# Patient Record
Sex: MALE | Race: WHITE | NOT HISPANIC OR LATINO | Employment: UNEMPLOYED | URBAN - METROPOLITAN AREA
[De-identification: names, ages, dates, MRNs, and addresses within clinical notes are randomized per-mention and may not be internally consistent; named-entity substitution may affect disease eponyms.]

---

## 2017-02-18 DIAGNOSIS — J31.0 CHRONIC RHINITIS: ICD-10-CM

## 2017-02-21 RX ORDER — FLUTICASONE PROPIONATE 50 MCG
SPRAY, SUSPENSION (ML) NASAL
Qty: 16 ML | Refills: 0 | Status: SHIPPED | OUTPATIENT
Start: 2017-02-21 | End: 2017-03-21

## 2017-02-21 NOTE — TELEPHONE ENCOUNTER
Prescription approved per [facility] Refill Protocol.  Tala Salcido RN

## 2017-03-21 DIAGNOSIS — J31.0 CHRONIC RHINITIS: ICD-10-CM

## 2017-03-21 NOTE — TELEPHONE ENCOUNTER
fluticasone (FLONASE) 50 MCG/ACT spray       Last Written Prescription Date: 2/21/17  Last Fill Quantity: 16 ml, # refills: 0    Last Office Visit with G, P or [facility] prescribing provider:  8/24/16   Future Office Visit:       Date of Last Asthma Action Plan Letter:   There are no preventive care reminders to display for this patient.   Asthma Control Test: No flowsheet data found.    Date of Last Spirometry Test:   No results found for this or any previous visit.            Neptali Faarax  [facility]

## 2017-03-23 RX ORDER — FLUTICASONE PROPIONATE 50 MCG
SPRAY, SUSPENSION (ML) NASAL
Qty: 16 ML | Refills: 4 | Status: SHIPPED | OUTPATIENT
Start: 2017-03-23 | End: 2019-04-05

## 2017-03-23 NOTE — TELEPHONE ENCOUNTER
Prescription approved per [facility] [place].  Beth Yepez RN

## 2017-06-02 ENCOUNTER — HOSPITAL ENCOUNTER (OUTPATIENT)
Facility: CLINIC | Age: 57
Setting detail: SPECIMEN
Discharge: HOME | End: 2017-06-02
Attending: INTERNAL MEDICINE | Admitting: INTERNAL MEDICINE
Payer: COMMERCIAL

## 2017-06-02 ENCOUNTER — RADIANT APPOINTMENT (OUTPATIENT)
Dept: GENERAL RADIOLOGY | Facility: CLINIC | Age: 57
End: 2017-06-02
Attending: INTERNAL MEDICINE
Payer: COMMERCIAL

## 2017-06-02 ENCOUNTER — OFFICE VISIT (OUTPATIENT)
Dept: FAMILY MEDICINE | Facility: CLINIC | Age: 57
End: 2017-06-02
Payer: COMMERCIAL

## 2017-06-02 VITALS
BODY MASS INDEX: 31.54 KG/M2 | DIASTOLIC BLOOD PRESSURE: 84 MMHG | OXYGEN SATURATION: 95 % | SYSTOLIC BLOOD PRESSURE: 131 MMHG | HEART RATE: 86 BPM | TEMPERATURE: 98.9 F | HEIGHT: 63 IN | WEIGHT: 178 LBS

## 2017-06-02 DIAGNOSIS — J30.89 SEASONAL ALLERGIC RHINITIS DUE TO OTHER ALLERGIC TRIGGER: Primary | ICD-10-CM

## 2017-06-02 DIAGNOSIS — R05.8 UPPER AIRWAY COUGH SYNDROME: ICD-10-CM

## 2017-06-02 LAB
BASOPHILS # BLD AUTO: 0 10E9/L (ref 0–0.2)
BASOPHILS NFR BLD AUTO: 0.3 %
DIFFERENTIAL METHOD BLD: NORMAL
EOSINOPHIL # BLD AUTO: 0.1 10E9/L (ref 0–0.7)
EOSINOPHIL NFR BLD AUTO: 2.4 %
ERYTHROCYTE [DISTWIDTH] IN BLOOD BY AUTOMATED COUNT: 12.6 % (ref 10–15)
HCT VFR BLD AUTO: 42.3 % (ref 40–53)
HGB BLD-MCNC: 14.3 G/DL (ref 13.3–17.7)
LYMPHOCYTES # BLD AUTO: 1 10E9/L (ref 0.8–5.3)
LYMPHOCYTES NFR BLD AUTO: 17.1 %
MCH RBC QN AUTO: 31.6 PG (ref 26.5–33)
MCHC RBC AUTO-ENTMCNC: 33.8 G/DL (ref 31.5–36.5)
MCV RBC AUTO: 93 FL (ref 78–100)
MONOCYTES # BLD AUTO: 0.4 10E9/L (ref 0–1.3)
MONOCYTES NFR BLD AUTO: 7 %
NEUTROPHILS # BLD AUTO: 4.3 10E9/L (ref 1.6–8.3)
NEUTROPHILS NFR BLD AUTO: 73.2 %
PLATELET # BLD AUTO: 176 10E9/L (ref 150–450)
RBC # BLD AUTO: 4.53 10E12/L (ref 4.4–5.9)
WBC # BLD AUTO: 5.9 10E9/L (ref 4–11)

## 2017-06-02 PROCEDURE — 85025 COMPLETE CBC W/AUTO DIFF WBC: CPT | Performed by: INTERNAL MEDICINE

## 2017-06-02 PROCEDURE — 70220 X-RAY EXAM OF SINUSES: CPT

## 2017-06-02 PROCEDURE — 99214 OFFICE O/P EST MOD 30 MIN: CPT | Performed by: INTERNAL MEDICINE

## 2017-06-02 PROCEDURE — 36415 COLL VENOUS BLD VENIPUNCTURE: CPT | Performed by: INTERNAL MEDICINE

## 2017-06-02 PROCEDURE — 82785 ASSAY OF IGE: CPT | Performed by: INTERNAL MEDICINE

## 2017-06-02 RX ORDER — FEXOFENADINE HCL 180 MG/1
180 TABLET ORAL EVERY MORNING
Qty: 30 TABLET | Refills: 11 | Status: SHIPPED | OUTPATIENT
Start: 2017-06-02 | End: 2017-07-06

## 2017-06-02 RX ORDER — METHYLPREDNISOLONE 4 MG
TABLET, DOSE PACK ORAL
Qty: 21 TABLET | Refills: 1 | Status: SHIPPED | OUTPATIENT
Start: 2017-06-02 | End: 2017-07-06

## 2017-06-02 RX ORDER — MONTELUKAST SODIUM 10 MG/1
10 TABLET ORAL AT BEDTIME
Qty: 30 TABLET | Refills: 11 | Status: SHIPPED | OUTPATIENT
Start: 2017-06-02 | End: 2019-02-11

## 2017-06-02 RX ORDER — LORATADINE 10 MG/1
10 TABLET ORAL EVERY MORNING
Qty: 30 TABLET | Refills: 11 | Status: SHIPPED | OUTPATIENT
Start: 2017-06-02 | End: 2018-02-01

## 2017-06-02 RX ORDER — CETIRIZINE HYDROCHLORIDE 10 MG/1
10 TABLET ORAL EVERY EVENING
Qty: 30 TABLET | Refills: 11 | Status: SHIPPED | OUTPATIENT
Start: 2017-06-02 | End: 2018-02-01

## 2017-06-02 NOTE — MR AVS SNAPSHOT
After Visit Summary   6/2/2017    Kody Benton    MRN: [id number]           Patient Information     Date Of Birth          1960        Visit Information        Provider Department      6/2/2017 1:40 PM Huang Keane MD [facility]        Today's Diagnoses     Seasonal allergic rhinitis due to other allergic trigger    -  1    Upper airway cough syndrome          Care Instructions    This summary includes the important diagnoses, test, medications and other important parts of your medical history.  Below are a few good we sites you can use to learn more about these.     Www.Pro[employer] Tools.org : Up to date and easily searchable information on multiple topics.  Www.[facility] Hospital[facility].Vivakor/Pharmacy/c_539084.asp : [facility] $4.99 medications  Www.Planet8.gov : medication info, interactive tutorials, watch real surgeries online  Www.familydoctor.org : good info from the Academy of Family Physicians  Www.Phobious.[employer] : good info from the [facility]  Www.cdc.gov : public health info, travel advisories, epidemics (H1N1)  Www.aap.org : children's health info, normal development, vaccinations  Www.health.[facility].[place].us : [place] dept of heatlh, public health issues in [place], N1N1    Based on your medical history and these are the current health maintenance or preventive care services that you are due for (some may have been done at this visit:)  Health Maintenance Due   Topic Date Due     TETANUS IMMUNIZATION (SYSTEM ASSIGNED)  07/14/1977     HEPATITIS C SCREENING  07/14/1977     COLON CANCER SCREEN (SYSTEM ASSIGNED)  07/14/2009     =================================================================================  Normal Values   Blood pressure  <140/90 for most adults    <130/80 for some chronic diseases (ask your care team about yours)    BMI (body mass index)  18.5-25 kg/m2 (based on height and weight)     Thank you for visiting [facility]    Normal or non-critical  lab and imaging results will be communicated to you by MyChart, letter or phone within 7 days.  If you do not hear from us within 10 days, please call the clinic. If you have a critical or abnormal lab result, we will notify you by phone as soon as possible.     If you have any questions regarding your visit please contact:     Team Comfort:   Clinic Hours Telephone Number   Dr. Marlon Castañeda   7am-5pm  Monday - Friday [phone]  Gregor RN   Pharmacy 8am-8pm Monday-Thursday      8am-6pm Friday  9am-5pm Saturday-Sunday [phone]   Urgent Care 11am-8pm Monday-Friday        9am-5pm Saturday-Sunday [phone]     After hours, weekend or if you need to make an appointment with your primary provider please call [phone].   After Hours nurse advise: call [facility]: [phone]    Medication Refills:  Call your pharmacy and they will forward the refill to us. Please allow 3 business days for your refills to be completed.    Use CrossCore (secure email communication and access to your chart) to send your primary care provider a message or make an appointment. Ask someone on your Team how to sign up for CrossCore. To log on to SIPX or for more information in [employer] please visit the website at [email or web address].  As of October 8, 2013, all password changes, disabled accounts, or ID changes in CrossCore/MyHealth will be done by our Access Services Department.   If you need help with your account or password, call: [phone]. Clinic staff no longer has the ability to change passwords.             Follow-ups after your visit        Who to contact     If you have questions or need follow up information about today's clinic visit or your schedule please contact [facility] directly at [phone].  Normal or non-critical lab and imaging results will be communicated to you by MyChart, letter or  "phone within 4 business days after the clinic has received the results. If you do not hear from us within 7 days, please contact the clinic through [employer] or phone. If you have a critical or abnormal lab result, we will notify you by phone as soon as possible.  Submit refill requests through [employer] or call your pharmacy and they will forward the refill request to us. Please allow 3 business days for your refill to be completed.          Additional Information About Your Visit        [employer] Information     [employer] gives you secure access to your electronic health record. If you see a primary care provider, you can also send messages to your care team and make appointments. If you have questions, please call your primary care clinic.  If you do not have a primary care provider, please call [phone] and they will assist you.        Care EveryWhere ID     This is your Care EveryWhere ID. This could be used by other organizations to access your [facility] medical records  [id number]        Your Vitals Were     Pulse Temperature Height Pulse Oximetry BMI (Body Mass Index)       86 98.9  F (37.2  C) (Oral) 5' 3\" (1.6 m) 95% 31.53 kg/m2        Blood Pressure from Last 3 Encounters:   06/02/17 131/84   08/24/16 108/65   01/07/16 101/63    Weight from Last 3 Encounters:   06/02/17 178 lb (80.7 kg)   08/24/16 163 lb (73.9 kg)   01/06/16 156 lb (70.8 kg)              We Performed the Following     CBC with platelets and differential     IgE     XR Sinus Complete G/E 3 Views          Today's Medication Changes          These changes are accurate as of: 6/2/17  2:34 PM.  If you have any questions, ask your nurse or doctor.               Start taking these medicines.        Dose/Directions    cetirizine 10 MG tablet   Commonly known as:  zyrTEC   Used for:  Seasonal allergic rhinitis due to other allergic trigger   Started by:  Huang Keane MD        Dose:  10 mg   Take 1 tablet (10 mg) by mouth every evening "   Quantity:  30 tablet   Refills:  11       methylPREDNISolone 4 MG tablet   Commonly known as:  MEDROL DOSEPAK   Used for:  Seasonal allergic rhinitis due to other allergic trigger   Started by:  Huang Keane MD        Follow package instructions   Quantity:  21 tablet   Refills:  1       montelukast 10 MG tablet   Commonly known as:  SINGULAIR   Used for:  Seasonal allergic rhinitis due to other allergic trigger   Started by:  Huang Keane MD        Dose:  10 mg   Take 1 tablet (10 mg) by mouth At Bedtime   Quantity:  30 tablet   Refills:  11         These medicines have changed or have updated prescriptions.        Dose/Directions    * loratadine 10 MG tablet   Commonly known as:  CLARITIN   This may have changed:  Another medication with the same name was added. Make sure you understand how and when to take each.   Used for:  Chronic rhinitis   Changed by:  Huang Keane MD        Dose:  10 mg   Take 1 tablet (10 mg) by mouth daily   Quantity:  90 tablet   Refills:  3       * loratadine 10 MG tablet   Commonly known as:  CLARITIN   This may have changed:  You were already taking a medication with the same name, and this prescription was added. Make sure you understand how and when to take each.   Used for:  Seasonal allergic rhinitis due to other allergic trigger   Changed by:  Huang Kenae MD        Dose:  10 mg   Take 1 tablet (10 mg) by mouth every morning   Quantity:  30 tablet   Refills:  11       * Notice:  This list has 2 medication(s) that are the same as other medications prescribed for you. Read the directions carefully, and ask your doctor or other care provider to review them with you.         Where to get your medicines      These medications were sent to [place] [address] [place] [facility] - [address]  [address] [place] [address]     Phone:  [phone]     cetirizine 10 MG tablet    loratadine 10 MG tablet    methylPREDNISolone 4 MG tablet     montelukast 10 MG tablet                Primary Care Provider Office Phone # Fax #    Huang Keane [phone]       [facility] [address]  [facility] [address]        Thank you!     Thank you for choosing [facility]  for your care. Our goal is always to provide you with excellent care. Hearing back from our patients is one way we can continue to improve our services. Please take a few minutes to complete the written survey that you may receive in the mail after your visit with us. Thank you!             Your Updated Medication List - Protect others around you: Learn how to safely use, store and throw away your medicines at www.disposemymeds.org.          This list is accurate as of: 6/2/17  2:34 PM.  Always use your most recent med list.                   Brand Name Dispense Instructions for use    cetirizine 10 MG tablet    zyrTEC    30 tablet    Take 1 tablet (10 mg) by mouth every evening       fluticasone 50 MCG/ACT spray    FLONASE    16 mL    USE TWO SPRAYS IN EACH NOSTRIL DAILY       * loratadine 10 MG tablet    CLARITIN    90 tablet    Take 1 tablet (10 mg) by mouth daily       * loratadine 10 MG tablet    CLARITIN    30 tablet    Take 1 tablet (10 mg) by mouth every morning       methylPREDNISolone 4 MG tablet    MEDROL DOSEPAK    21 tablet    Follow package instructions       montelukast 10 MG tablet    SINGULAIR    30 tablet    Take 1 tablet (10 mg) by mouth At Bedtime       * Notice:  This list has 2 medication(s) that are the same as other medications prescribed for you. Read the directions carefully, and ask your doctor or other care provider to review them with you.

## 2017-06-02 NOTE — PATIENT INSTRUCTIONS
This summary includes the important diagnoses, test, medications and other important parts of your medical history.  Below are a few good we sites you can use to learn more about these.     Www.Ribbit.org : Up to date and easily searchable information on multiple topics.  Www.Ribbit.org/Pharmacy/c_539084.asp : [facility] $4.99 medications  Www.medlineplus.gov : medication info, interactive tutorials, watch real surgeries online  Www.familydoctor.org : good info from the Academy of Family Physicians  Www.eedeninic.com : good info from the [facility]  Www.cdc.gov : public health info, travel advisories, epidemics (H1N1)  Www.aap.org : children's health info, normal development, vaccinations  Www.health.[facility].[place].us : [place] dept of heat, public health issues in [place], N1N1    Based on your medical history and these are the current health maintenance or preventive care services that you are due for (some may have been done at this visit:)  Health Maintenance Due   Topic Date Due     TETANUS IMMUNIZATION (SYSTEM ASSIGNED)  07/14/1977     HEPATITIS C SCREENING  07/14/1977     COLON CANCER SCREEN (SYSTEM ASSIGNED)  07/14/2009     =================================================================================  Normal Values   Blood pressure  <140/90 for most adults    <130/80 for some chronic diseases (ask your care team about yours)    BMI (body mass index)  18.5-25 kg/m2 (based on height and weight)     Thank you for visiting [facility]    Normal or non-critical lab and imaging results will be communicated to you by MyChart, letter or phone within 7 days.  If you do not hear from us within 10 days, please call the clinic. If you have a critical or abnormal lab result, we will notify you by phone as soon as possible.     If you have any questions regarding your visit please contact:     Team Comfort:   Clinic Hours Telephone Number   Dr. Marlon Douglass Dr.  Mehdi Robertshel Lior  Do Castañeda   7am-5pm  Monday - Friday [phone]  rGegor SCOTT   Pharmacy 8am-8pm Monday-Thursday      8am-6pm Friday  9am-5pm Saturday-Sunday [phone]   Urgent Care 11am-8pm Monday-Friday        9am-5pm Saturday-Sunday [phone]     After hours, weekend or if you need to make an appointment with your primary provider please call [phone].   After Hours nurse advise: call [facility]: [phone]    Medication Refills:  Call your pharmacy and they will forward the refill to us. Please allow 3 business days for your refills to be completed.    Use [employer] (secure email communication and access to your chart) to send your primary care provider a message or make an appointment. Ask someone on your Team how to sign up for [employer]. To log on to 4Blox or for more information in FilmDoo please visit the website at [email or web address].  As of October 8, 2013, all password changes, disabled accounts, or ID changes in [employer]/MyHealth will be done by our Access Services Department.   If you need help with your account or password, call: [phone]. Clinic staff no longer has the ability to change passwords.

## 2017-06-02 NOTE — NURSING NOTE
"Chief Complaint   Patient presents with     Allergies       Initial /84 (BP Location: Left arm, Patient Position: Chair, Cuff Size: Adult Regular)  Pulse 86  Temp 98.9  F (37.2  C) (Oral)  Ht 5' 3\" (1.6 m)  Wt 178 lb (80.7 kg)  SpO2 95%  BMI 31.53 kg/m2 Estimated body mass index is 31.53 kg/(m^2) as calculated from the following:    Height as of this encounter: 5' 3\" (1.6 m).    Weight as of this encounter: 178 lb (80.7 kg).  Medication Reconciliation: complete     Sai Claros MA      "

## 2017-06-02 NOTE — PROGRESS NOTES
SUBJECTIVE:                                                    Kody Benton is a 56 year old male who presents to clinic today for the following health issues:    ALLERGIES     Onset: ongoing issue    Description:   Nasal congestion: YES  Sneezing: YES  Red, itchy eyes: YES- sometime    Progression of Symptoms:  same    Accompanying Signs & Symptoms:  Cough: no  Wheezing: no  Rash: no  Sinus/facial pain: YES- sometime   History:   Is it seasonal: during any time of the year   History of Asthma: no  Has allergy testing been done: no    Precipitating factors:   None    Alleviating factors:  None       Therapies Tried and outcome: claritin and flonase, no relief          Problem list and histories reviewed & adjusted, as indicated.  Additional history: as documented    Patient Active Problem List   Diagnosis     Overweight (BMI 25.0-29.9)     Advanced directives, counseling/discussion     Down's syndrome     Unilateral inguinal hernia     Chronic rhinitis     Esophageal web determined by endoscopy     Gastritis, Helicobacter pylori     History reviewed. No pertinent surgical history.    Social History   Substance Use Topics     Smoking status: Never Smoker     Smokeless tobacco: Never Used     Alcohol use Yes      Comment: socially     History reviewed. No pertinent family history.      No Known Allergies  Recent Labs   Lab Test  10/08/15   1405   LDL  70   HDL  63   TRIG  102   ALT  16   CR  0.94   GFRESTIMATED  83   GFRESTBLACK  >90   GFR Calc     POTASSIUM  3.9      BP Readings from Last 3 Encounters:   06/02/17 131/84   08/24/16 108/65   01/07/16 101/63    Wt Readings from Last 3 Encounters:   06/02/17 178 lb (80.7 kg)   08/24/16 163 lb (73.9 kg)   01/06/16 156 lb (70.8 kg)                  ROS:  C: NEGATIVE for fever, chills, change in weight  I: NEGATIVE for worrisome rashes, moles or lesions  E: NEGATIVE for vision changes or irritation  ENT/MOUTH: As above.  RESP:NEGATIVE for hemoptysis,  "pleurisy and wheezing  CV: NEGATIVE for chest pain, palpitations or peripheral edema  GI: NEGATIVE for nausea, abdominal pain, heartburn, or change in bowel habits  : NEGATIVE for frequency, dysuria, or hematuria  M: NEGATIVE for significant arthralgias or myalgia  N: NEGATIVE for weakness, dizziness or paresthesias  E: NEGATIVE for temperature intolerance, skin/hair changes  H: NEGATIVE for bleeding problems  P: NEGATIVE for changes in mood or affect    OBJECTIVE:                                                    /84 (BP Location: Left arm, Patient Position: Chair, Cuff Size: Adult Regular)  Pulse 86  Temp 98.9  F (37.2  C) (Oral)  Ht 5' 3\" (1.6 m)  Wt 178 lb (80.7 kg)  SpO2 95%  BMI 31.53 kg/m2  Body mass index is 31.53 kg/(m^2).  GENERAL: healthy, alert and no distress  EYES: Eyes grossly normal to inspection  HENT: ear canals and TM's normal and nose and mouth without ulcers or lesions  NECK: no adenopathy  RESP: lungs clear to auscultation - no rales, no rhonchi, no wheezes  CV: regular rates and rhythm, normal S1 S2, no S3 or S4 and no murmur, no click or rub -  SKIN: no suspicious lesions, no rashes  NEURO: strength and tone- normal, sensory exam- grossly normal, mentation- intact, speech- normal, reflexes- symmetric  BACK: no CVA tenderness, no paralumbar tenderness  - male: testicles- normal, no atrophy, no masses;  no inguinal hernias  RECTAL- male: no masses, no hemorhoids, Prostate- symmetric, no  nodularity, no masses, no hypertrophy  PSYCH: Alert and oriented times 3; speech- coherent    Diagnostic test results:  Results for orders placed or performed in visit on 06/02/17   XR Sinus Complete G/E 3 Views    Narrative    XR SINUS COMPLETE G/E 3 VW 6/2/2017 2:44 PM    COMPARISON: None.    HISTORY: Cough      Impression    IMPRESSION: No air-fluid levels are seen in the sinuses. No fractures  identified.    PELON MORTENSEN   IgE   Result Value Ref Range     0 - 114 KIU/L   CBC with " platelets and differential   Result Value Ref Range    WBC 5.9 4.0 - 11.0 10e9/L    RBC Count 4.53 4.4 - 5.9 10e12/L    Hemoglobin 14.3 13.3 - 17.7 g/dL    Hematocrit 42.3 40.0 - 53.0 %    MCV 93 78 - 100 fl    MCH 31.6 26.5 - 33.0 pg    MCHC 33.8 31.5 - 36.5 g/dL    RDW 12.6 10.0 - 15.0 %    Platelet Count 176 150 - 450 10e9/L    Diff Method Automated Method     % Neutrophils 73.2 %    % Lymphocytes 17.1 %    % Monocytes 7.0 %    % Eosinophils 2.4 %    % Basophils 0.3 %    Absolute Neutrophil 4.3 1.6 - 8.3 10e9/L    Absolute Lymphocytes 1.0 0.8 - 5.3 10e9/L    Absolute Monocytes 0.4 0.0 - 1.3 10e9/L    Absolute Eosinophils 0.1 0.0 - 0.7 10e9/L    Absolute Basophils 0.0 0.0 - 0.2 10e9/L        ASSESSMENT/PLAN:                                                        ICD-10-CM    1. Seasonal allergic rhinitis due to other allergic trigger J30.89 loratadine (CLARITIN) 10 MG tablet     cetirizine (ZYRTEC) 10 MG tablet     montelukast (SINGULAIR) 10 MG tablet     methylPREDNISolone (MEDROL DOSEPAK) 4 MG tablet     IgE     CBC with platelets and differential     fexofenadine (ALLEGRA) 180 MG tablet   2. Upper airway cough syndrome R05 XR Sinus Complete G/E 3 Views       Follow-up visit if condition worsens.    Huang Keane MD  [facility]

## 2017-06-06 LAB — IGE SERPL-ACNC: 109 KIU/L (ref 0–114)

## 2017-06-12 ENCOUNTER — TELEPHONE (OUTPATIENT)
Dept: FAMILY MEDICINE | Facility: CLINIC | Age: 57
End: 2017-06-12

## 2017-06-12 NOTE — TELEPHONE ENCOUNTER
..Reason for Call:    Authorization form is missing information    Detailed comments: only one attempt is made to gather missing information, after that, a denial will be issued for lack of information    Phone Number Patient can be reached at: Other phone number:  [phone]   Best Time: anytime    Can we leave a detailed message on this number? Not Applicable    Call taken on 6/12/2017 at 10:16 AM by Valencia Prather

## 2017-06-13 NOTE — TELEPHONE ENCOUNTER
[facility]: DOMINIQUE called [phone]  She received a Service Authorization Form for a referral to an unsure location.   Sadie is out of network for [facility].  Dominique is going to refax the form. If the info is not completed and faxed back by tomorrow she will have to denied the visit due to lack of information  Lyndsey Jones

## 2017-06-15 NOTE — TELEPHONE ENCOUNTER
Patient visits was approved by [facility] for a 120 days. Approval number is [id number].  An approval letter will be faxed to the clinic and the patient.  Form was given to Chen Jones

## 2017-07-06 ENCOUNTER — OFFICE VISIT (OUTPATIENT)
Dept: FAMILY MEDICINE | Facility: CLINIC | Age: 57
End: 2017-07-06
Payer: COMMERCIAL

## 2017-07-06 VITALS
SYSTOLIC BLOOD PRESSURE: 112 MMHG | TEMPERATURE: 99.5 F | WEIGHT: 177 LBS | HEART RATE: 75 BPM | OXYGEN SATURATION: 97 % | BODY MASS INDEX: 31.36 KG/M2 | HEIGHT: 63 IN | DIASTOLIC BLOOD PRESSURE: 70 MMHG

## 2017-07-06 DIAGNOSIS — J01.01 ACUTE RECURRENT MAXILLARY SINUSITIS: ICD-10-CM

## 2017-07-06 DIAGNOSIS — R53.83 FATIGUE, UNSPECIFIED TYPE: ICD-10-CM

## 2017-07-06 DIAGNOSIS — J30.81 ALLERGIC RHINITIS DUE TO ANIMAL HAIR AND DANDER, UNSPECIFIED CHRONICITY: Primary | ICD-10-CM

## 2017-07-06 PROCEDURE — 99214 OFFICE O/P EST MOD 30 MIN: CPT | Performed by: INTERNAL MEDICINE

## 2017-07-06 RX ORDER — PREDNISONE 20 MG/1
TABLET ORAL
Qty: 17 TABLET | Refills: 0 | Status: SHIPPED | OUTPATIENT
Start: 2017-07-06 | End: 2017-08-07

## 2017-07-06 RX ORDER — AZITHROMYCIN 250 MG/1
TABLET, FILM COATED ORAL
Qty: 6 TABLET | Refills: 1 | Status: SHIPPED | OUTPATIENT
Start: 2017-07-06 | End: 2017-08-07

## 2017-07-06 ASSESSMENT — PAIN SCALES - GENERAL: PAINLEVEL: NO PAIN (0)

## 2017-07-06 NOTE — PROGRESS NOTES
SUBJECTIVE:                                                    Kody Benton is a 56 year old male who presents to clinic today for the following health issues:      ALLERGIES     Onset: year round    Description:   Nasal congestion: YES  Sneezing: YES  Red, itchy eyes: YES- itchy    Progression of Symptoms:  worse    Accompanying Signs & Symptoms:  Cough: YES-last 3 days  Wheezing: no  Rash: no  Sinus/facial pain: YES   History:   Is it seasonal: during any time of the year   History of Asthma: no  Has allergy testing been done: YES- long time ago    Precipitating factors:   Suspected    Alleviating factors:  None       Therapies Tried and outcome: Allegra, Flonase, Claritin, Zyrtec & Singulair (not effective). Medrol Dosepak (modestly effective but without sustained effect)      Problem list and histories reviewed & adjusted, as indicated.  Additional history: as documented    Patient Active Problem List   Diagnosis     Overweight (BMI 25.0-29.9)     Advanced directives, counseling/discussion     Down's syndrome     Unilateral inguinal hernia     Chronic rhinitis     Esophageal web determined by endoscopy     Gastritis, Helicobacter pylori     History reviewed. No pertinent surgical history.    Social History   Substance Use Topics     Smoking status: Never Smoker     Smokeless tobacco: Never Used     Alcohol use Yes      Comment: socially     History reviewed. No pertinent family history.      No Known Allergies  Recent Labs   Lab Test  10/08/15   1405   LDL  70   HDL  63   TRIG  102   ALT  16   CR  0.94   GFRESTIMATED  83   GFRESTBLACK  >90   GFR Calc     POTASSIUM  3.9      BP Readings from Last 3 Encounters:   07/06/17 112/70   06/02/17 131/84   08/24/16 108/65    Wt Readings from Last 3 Encounters:   07/06/17 177 lb (80.3 kg)   06/02/17 178 lb (80.7 kg)   08/24/16 163 lb (73.9 kg)                    ROS:  CONSTITUTIONAL:POSITIVE  for fatigue and malaise  I: NEGATIVE for worrisome rashes,  "moles or lesions  E: NEGATIVE for vision changes or irritation  ENT/MOUTH: NEGATIVE for epistaxis and vertigo  RESP:NEGATIVE for hemoptysis and pleurisy  CV: NEGATIVE for chest pain, palpitations or peripheral edema  GI: NEGATIVE for nausea, abdominal pain, heartburn, or change in bowel habits  : NEGATIVE for frequency, dysuria, or hematuria  M: NEGATIVE for significant arthralgias or myalgia  N: NEGATIVE for weakness, dizziness or paresthesias  E: NEGATIVE for temperature intolerance, skin/hair changes  H: NEGATIVE for bleeding problems  P: NEGATIVE for changes in mood or affect    OBJECTIVE:                                                    /70 (BP Location: Left arm, Patient Position: Chair, Cuff Size: Adult Regular)  Pulse 75  Temp 99.5  F (37.5  C) (Oral)  Ht 5' 3\" (1.6 m)  Wt 177 lb (80.3 kg)  SpO2 97%  BMI 31.35 kg/m2  Body mass index is 31.35 kg/(m^2).  GENERAL: alert and mild distress  EYES: Eyes grossly normal to inspection and conjunctivae and sclerae normal  HENT: maxillary sinus tenderness bilateral and edematous nasal mucosa.  NECK: no adenopathy and thyroid normal to palpation  RESP: lungs clear to auscultation - no rales, rhonchi or wheezes and no rales or rhonchi  CV: regular rates and rhythm, normal S1 S2, no S3 or S4 and no murmur, no click or rub -  MS: extremities- no gross deformities noted, no edema  SKIN: no suspicious lesions, no rashes  NEURO: strength and tone- normal, sensory exam- grossly normal, mentation- intact, speech- normal, reflexes- symmetric  PSYCH: Alert and oriented times 3; speech- coherent , normal rate and volume; able to articulate logical thoughts, able to abstract reason, no tangential thoughts, no hallucinations or delusions, affect- normal    Diagnostic test results:  Results for orders placed or performed in visit on 06/02/17   XR Sinus Complete G/E 3 Views    Narrative    XR SINUS COMPLETE G/E 3 VW 6/2/2017 2:44 PM    COMPARISON: None.    HISTORY: " Cough      Impression    IMPRESSION: No air-fluid levels are seen in the sinuses. No fractures  identified.    PELON MORTENSEN   IgE   Result Value Ref Range     0 - 114 KIU/L   CBC with platelets and differential   Result Value Ref Range    WBC 5.9 4.0 - 11.0 10e9/L    RBC Count 4.53 4.4 - 5.9 10e12/L    Hemoglobin 14.3 13.3 - 17.7 g/dL    Hematocrit 42.3 40.0 - 53.0 %    MCV 93 78 - 100 fl    MCH 31.6 26.5 - 33.0 pg    MCHC 33.8 31.5 - 36.5 g/dL    RDW 12.6 10.0 - 15.0 %    Platelet Count 176 150 - 450 10e9/L    Diff Method Automated Method     % Neutrophils 73.2 %    % Lymphocytes 17.1 %    % Monocytes 7.0 %    % Eosinophils 2.4 %    % Basophils 0.3 %    Absolute Neutrophil 4.3 1.6 - 8.3 10e9/L    Absolute Lymphocytes 1.0 0.8 - 5.3 10e9/L    Absolute Monocytes 0.4 0.0 - 1.3 10e9/L    Absolute Eosinophils 0.1 0.0 - 0.7 10e9/L    Absolute Basophils 0.0 0.0 - 0.2 10e9/L      CT MAXILLOFACIAL W/O CONTRAST 7/7/2017 2:25 PM     Provided History: Cough      Comparison: 6/2/2017      Technique:  Using thin collimation multidetector helical acquisition  technique, axial, coronal, and sagittal thin section CT images were  reconstructed through the paranasal sinuses. Images were reviewed in  bone and soft tissue windows.     Findings:   Maxillary sinuses: Mild frothy debris anteriorly in the maxillary  sinuses.  Sphenoid sinus: clear.  Frontal sinus: clear.  Ethmoid air cells: clear.     The ostiomeatal units are opacified. The bony walls of the paranasal  sinuses are intact.     Normal retromaxillary and pterygopalatine fat.     The adenoid tonsils in the nasopharynx are unremarkable.         Impression:    Findings suggestive of maxillary sinusitis bilaterally     I have personally reviewed the examination and initial interpretation  and I agree with the findings.     CORI GUILLAUME MD  ASSESSMENT/PLAN:                                                        ICD-10-CM    1. Allergic rhinitis due to animal hair and  dander, unspecified chronicity J30.81 predniSONE (DELTASONE) 20 MG tablet            -suspected predisposing factor to his sinusitis, triggered by exposure to a pet cat home.  ALLERGY/ASTHMA ADULT REFERRAL   2. Acute recurrent maxillary sinusitis J01.01 predniSONE (DELTASONE) 20 MG tablet            -cause of persistent postnasal discharge, as evidenced by his abnormal CT of maxillofacial bones.  CT Maxillofacial w/o Contrast     azithromycin (ZITHROMAX) 250 MG tablet     amoxicillin-clavulanate (AUGMENTIN) 875-125 MG per tablet     predniSONE (DELTASONE) 20 MG tablet   3. Fatigue, unspecified type R53.83 CBC with platelets and differential            -due to persistent upper airway cough syndrome (conditions # 1 and #2).  Comprehensive metabolic panel (BMP + Alb, Alk Phos, ALT, AST, Total. Bili, TP)                 Follow-up visit if condition worsens.    Huang Keane MD  [facility]

## 2017-07-06 NOTE — MR AVS SNAPSHOT
After Visit Summary   7/6/2017    Kody Benton    MRN: [id number]           Patient Information     Date Of Birth          1960        Visit Information        Provider Department      7/6/2017 4:20 PM uHang Keane MD [facility]        Today's Diagnoses     Allergic rhinitis due to animal hair and dander, unspecified chronicity    -  1    Upper airway cough syndrome        Fatigue, unspecified type        Acute bronchitis, unspecified organism          Care Instructions        ================================================================================  Normal Values   Blood pressure  <140/90 for most adults    <130/80 for some chronic diseases (ask your care team about yours)    BMI (body mass index)  18.5-25 kg/m2 (based on height and weight)     Thank you for visiting [facility]    Normal or non-critical lab and imaging results will be communicated to you by MyChart, letter or phone within 7 days.  If you do not hear from us within 10 days, please call the clinic. If you have a critical or abnormal lab result, we will notify you by phone as soon as possible.     If you have any questions regarding your visit please contact:     Team Comfort:   Clinic Hours Telephone Number   Dr. Marlon Keane 7am-5pm  Monday - Friday [phone]  Jennifer Campos RN   Pharmacy 8:00am-8pm Monday-Friday    9am-5pm Saturday-Sunday [phone]   Urgent Care 11am-9pm Monday-Friday        9am-5pm Saturday-Sunday [phone]     After hours, weekend or if you need to make an appointment with your primary provider please call [phone].   After Hours nurse advise: call [facility]: [phone]    Medication Refills:  Call your pharmacy and they will forward the refill to us. Please allow 3 business days for your refills to be completed.                  Follow-ups  after your visit        Additional Services     ALLERGY/ASTHMA ADULT REFERRAL       Your provider has referred you to: FMEVELYN: [facility] [place]  [phone] [email or web address]    Please be aware that coverage of these services is subject to the terms and limitations of your health insurance plan.  Call member services at your health plan with any benefit or coverage questions.      Please bring the following with you to your appointment:    (1) Any X-Rays, CTs or MRIs which have been performed.  Contact the facility where they were done to arrange for  prior to your scheduled appointment.    (2) List of current medications  (3) This referral request   (4) Any documents/labs given to you for this referral                  Future tests that were ordered for you today     Open Future Orders        Priority Expected Expires Ordered    CT Maxillofacial w/o Contrast Routine  7/6/2018 7/6/2017            Who to contact     If you have questions or need follow up information about today's clinic visit or your schedule please contact [facility] [place] directly at [phone].  Normal or non-critical lab and imaging results will be communicated to you by [facility], letter or phone within 4 business days after the clinic has received the results. If you do not hear from us within 7 days, please contact the clinic through [facility] or phone. If you have a critical or abnormal lab result, we will notify you by phone as soon as possible.  Submit refill requests through [facility] or call your pharmacy and they will forward the refill request to us. Please allow 3 business days for your refill to be completed.          Additional Information About Your Visit        [facility] Information     [employer] gives you secure access to your electronic health record. If you see a primary care provider, you can also send messages to your care team and make appointments. If you have questions,  "please call your primary care clinic.  If you do not have a primary care provider, please call [phone] and they will assist you.        Care EveryWhere ID     This is your Care EveryWhere ID. This could be used by other organizations to access your [place] medical records  [id number]        Your Vitals Were     Pulse Temperature Height Pulse Oximetry BMI (Body Mass Index)       75 99.5  F (37.5  C) (Oral) 5' 3\" (1.6 m) 97% 31.35 kg/m2        Blood Pressure from Last 3 Encounters:   07/06/17 112/70   06/02/17 131/84   08/24/16 108/65    Weight from Last 3 Encounters:   07/06/17 177 lb (80.3 kg)   06/02/17 178 lb (80.7 kg)   08/24/16 163 lb (73.9 kg)              We Performed the Following     ALLERGY/ASTHMA ADULT REFERRAL     CBC with platelets and differential     Comprehensive metabolic panel (BMP + Alb, Alk Phos, ALT, AST, Total. Bili, TP)          Today's Medication Changes          These changes are accurate as of: 7/6/17  5:13 PM.  If you have any questions, ask your nurse or doctor.               Start taking these medicines.        Dose/Directions    azithromycin 250 MG tablet   Commonly known as:  ZITHROMAX   Used for:  Acute bronchitis, unspecified organism   Started by:  Huang Keane MD        Two tablets first day, then one tablet daily for four days.   Quantity:  6 tablet   Refills:  1       predniSONE 20 MG tablet   Commonly known as:  DELTASONE   Used for:  Allergic rhinitis due to animal hair and dander, unspecified chronicity   Started by:  Huang Keane MD        Take 2 tablets daily x 5 days, then 1 tablet daily x 5 days and then 1/2 tablet daily x 4 days.   Quantity:  17 tablet   Refills:  0         Stop taking these medicines if you haven't already. Please contact your care team if you have questions.     fexofenadine 180 MG tablet   Commonly known as:  ALLEGRA   Stopped by:  Huang Keane MD                Where to get your medicines      These medications were " sent to [facility] [address] [facility] - [place], [place] - [address]  [address], [place] [address]     Phone:  [phone]     azithromycin 250 MG tablet    predniSONE 20 MG tablet                Primary Care Provider Office Phone # Fax #    [place] Keane [phone]       [facility] [address]  [facility] [place] [address]        Equal Access to Services     BLAIR WILLIAMSON : Hadii aad ku hadasho Soomaali, waaxda luqadaha, qaybta kaalmada adeegyada, waxay idiin hayaan adeeg kharash la'aadrake . So [facility] [phone].    ATENCIÓN: Si habla español, tiene a valiente disposición servicios gratuitos de asistencia lingüística. [facility] [phone].    We comply with applicable federal civil rights laws and [place] laws. We do not discriminate on the basis of race, color, national origin, age, disability sex, sexual orientation or gender identity.            Thank you!     Thank you for choosing [facility]  for your care. Our goal is always to provide you with excellent care. Hearing back from our patients is one way we can continue to improve our services. Please take a few minutes to complete the written survey that you may receive in the mail after your visit with us. Thank you!             Your Updated Medication List - Protect others around you: Learn how to safely use, store and throw away your medicines at www.disposemymeds.org.          This list is accurate as of: 7/6/17  5:13 PM.  Always use your most recent med list.                   Brand Name Dispense Instructions for use Diagnosis    azithromycin 250 MG tablet    ZITHROMAX    6 tablet    Two tablets first day, then one tablet daily for four days.    Acute bronchitis, unspecified organism       cetirizine 10 MG tablet    zyrTEC    30 tablet    Take 1 tablet (10 mg) by mouth every evening    Seasonal allergic rhinitis due to other allergic trigger       fluticasone 50 MCG/ACT spray    FLONASE    16  mL    USE TWO SPRAYS IN EACH NOSTRIL DAILY    Chronic rhinitis       loratadine 10 MG tablet    CLARITIN    30 tablet    Take 1 tablet (10 mg) by mouth every morning    Seasonal allergic rhinitis due to other allergic trigger       montelukast 10 MG tablet    SINGULAIR    30 tablet    Take 1 tablet (10 mg) by mouth At Bedtime    Seasonal allergic rhinitis due to other allergic trigger       predniSONE 20 MG tablet    DELTASONE    17 tablet    Take 2 tablets daily x 5 days, then 1 tablet daily x 5 days and then 1/2 tablet daily x 4 days.    Allergic rhinitis due to animal hair and dander, unspecified chronicity

## 2017-07-06 NOTE — PATIENT INSTRUCTIONS
================================================================================  Normal Values   Blood pressure  <140/90 for most adults    <130/80 for some chronic diseases (ask your care team about yours)    BMI (body mass index)  18.5-25 kg/m2 (based on height and weight)     Thank you for visiting [facility]    Normal or non-critical lab and imaging results will be communicated to you by MyChart, letter or phone within 7 days.  If you do not hear from us within 10 days, please call the clinic. If you have a critical or abnormal lab result, we will notify you by phone as soon as possible.     If you have any questions regarding your visit please contact:     Team Comfort:   Clinic Hours Telephone Number   Dr. Marlon Keane 7am-5pm  Monday - Friday [phone]  Jennifer Campos RN   Pharmacy 8:00am-8pm Monday-Friday    9am-5pm Saturday-Sunday [phone]   Urgent Care 11am-9pm Monday-Friday        9am-5pm Saturday-Sunday [phone]     After hours, weekend or if you need to make an appointment with your primary provider please call [phone].   After Hours nurse advise: call [facility]: [phone]    Medication Refills:  Call your pharmacy and they will forward the refill to us. Please allow 3 business days for your refills to be completed.

## 2017-07-07 ENCOUNTER — RADIANT APPOINTMENT (OUTPATIENT)
Dept: CT IMAGING | Facility: CLINIC | Age: 57
End: 2017-07-07
Attending: INTERNAL MEDICINE
Payer: COMMERCIAL

## 2017-07-07 DIAGNOSIS — R05.8 UPPER AIRWAY COUGH SYNDROME: ICD-10-CM

## 2017-07-07 PROCEDURE — 70486 CT MAXILLOFACIAL W/O DYE: CPT | Performed by: RADIOLOGY

## 2017-07-10 RX ORDER — PREDNISONE 20 MG/1
TABLET ORAL
Qty: 14 TABLET | Refills: 1 | Status: SHIPPED | OUTPATIENT
Start: 2017-07-10 | End: 2017-08-07

## 2017-08-07 ENCOUNTER — OFFICE VISIT (OUTPATIENT)
Dept: ALLERGY | Facility: CLINIC | Age: 57
End: 2017-08-07
Attending: INTERNAL MEDICINE
Payer: COMMERCIAL

## 2017-08-07 VITALS
WEIGHT: 177 LBS | DIASTOLIC BLOOD PRESSURE: 72 MMHG | HEART RATE: 98 BPM | OXYGEN SATURATION: 97 % | SYSTOLIC BLOOD PRESSURE: 121 MMHG | BODY MASS INDEX: 32.57 KG/M2 | HEIGHT: 62 IN

## 2017-08-07 DIAGNOSIS — J30.89 ALLERGIC RHINITIS DUE TO MOLD: ICD-10-CM

## 2017-08-07 DIAGNOSIS — J30.1 CHRONIC SEASONAL ALLERGIC RHINITIS DUE TO POLLEN: ICD-10-CM

## 2017-08-07 DIAGNOSIS — Q39.4 ESOPHAGEAL WEB DETERMINED BY ENDOSCOPY: ICD-10-CM

## 2017-08-07 DIAGNOSIS — J30.89 ALLERGIC RHINITIS DUE TO DUST MITE: ICD-10-CM

## 2017-08-07 DIAGNOSIS — R07.89 CHEST TIGHTNESS: Primary | ICD-10-CM

## 2017-08-07 DIAGNOSIS — J30.81 ALLERGIC RHINITIS DUE TO CAT HAIR: ICD-10-CM

## 2017-08-07 LAB
FEF 25/75: NORMAL
FEV-1: NORMAL
FEV1/FVC: NORMAL
FVC: NORMAL

## 2017-08-07 PROCEDURE — 99204 OFFICE O/P NEW MOD 45 MIN: CPT | Mod: 25 | Performed by: ALLERGY & IMMUNOLOGY

## 2017-08-07 PROCEDURE — 95004 PERQ TESTS W/ALRGNC XTRCS: CPT | Performed by: ALLERGY & IMMUNOLOGY

## 2017-08-07 PROCEDURE — 94010 BREATHING CAPACITY TEST: CPT | Performed by: ALLERGY & IMMUNOLOGY

## 2017-08-07 RX ORDER — ALBUTEROL SULFATE 90 UG/1
2 AEROSOL, METERED RESPIRATORY (INHALATION) EVERY 4 HOURS PRN
Qty: 2 INHALER | Refills: 3 | Status: SHIPPED | OUTPATIENT
Start: 2017-08-07 | End: 2019-10-25

## 2017-08-07 RX ORDER — AZELASTINE 1 MG/ML
1 SPRAY, METERED NASAL 2 TIMES DAILY
Qty: 1 BOTTLE | Refills: 11 | Status: SHIPPED | OUTPATIENT
Start: 2017-08-07 | End: 2018-12-26

## 2017-08-07 NOTE — MR AVS SNAPSHOT
After Visit Summary   8/7/2017    Kody Benton    MRN: [id number]           Patient Information     Date Of Birth          1960        Visit Information        Provider Department      8/7/2017 3:00 PM Dinesh Mata DO [facility]        Today's Diagnoses     Chest tightness    -  1    Rhinoconjunctivitis          Care Instructions    Allergy Staff Appt Hours Shot Hours Locations    Physician     Dinesh Mata DO       Support Staff     SONIA Hough MA  Monday:                      [place] 8-7 Tuesday:         [place] 8-5 Wednesday:        [place]: 7-5 Friday:        [place] 7-5 [place] Monday: 9-6 Friday: 7-2     [place]        Tuesday: 7-12 Thursday: 1-6 [place] Tuesday: 1-6 Wednesday: 11-6 Thursday: 7-12 [facility]  [address] [place] [address]  Appt Line: [phone]  Allergy RN (Monday):  [phone]    [facility]  [address] [place] [address]  Appt Line: [phone]  Allergy RN (Tues & Wed):  [phone]    [facility]  [address] [place] [address]  Appt Line: [phone]  Allergy RN (Friday):  [phone]       Important Scheduling Information  Aspirin Desensitization: Appt will last 2 clinic days. Please call the Allergy RN line for your clinic to schedule. Discontinue antihistamines 7 days prior to the appointment.     Food Challenges: Appt will last 3-4 hours. Please call the Allergy RN line for your clinic to schedule. Discontinue antihistamines 7 days prior to the appointment.     Penicillin Testing: Appt will last 2-3 hours. Please call the Allergy RN line for your clinic to schedule. Discontinue antihistamines 7 days prior to the appointment.     Skin Testing: Appt will about 40 minutes. Call the appointment line for your clinic to schedule. Discontinue antihistamines 7 days prior to the appointment.     Venom Testing:  Appt will last 2-3 hours. Please call the Allergy RN line for your clinic to schedule. Discontinue antihistamines 7 days prior to the appointment.     Thank you for trusting us with your Allergy, Asthma, and Immunology care. Please feel free to contact us with any questions or concerns you may have.      - Albuterol 2-4 puffs inhaled (use a spacer unless using a Proair Respiclick device) every 4 hours as needed for chest tightness, wheezing, shortness of breath and/or coughing.   - Continue Flonase 2 sprays/nostril daily.   - Continue Allegra 180mg by mouth daily.   - Continue Singulair 10mg by mouth daily.   - Start Azelastine 2 sprays/nostril twice daily.   - If no improvement would consider allergy shots.   - Return to clinic in 6 weeks.     AEROALLERGEN AVOIDANCE INSTRUCTIONS  MOLD  Indoors, mold season is year round. Outdoors, most mold prefer seasons with high humidity. Mold prefers damp, dark, warm places. Here are some tips on how to avoid mold exposure.  1.  Keep humidity inside between 35-50% with air conditioning or dehumidifier. The humidity level can be checked with a meter from a hardware store.   2.  Clean surfaces where mold grows and dry wet areas.  3.  Avoid steam cleaning carpets and discard moldy belongings.  4.  Wear a mask when doing yard work and refrain from walking through uncut fields or playing in leaves.  5.  Minimize use of potted plants and do not keep them indoors.  6.  Consider an allergy cover for the pillow and mattress.  POLLEN  Pollens are the tiny airborne particles given off by trees, weeds, and grasses. They can be the cause of seasonal allergic rhinitis or hay fever symptoms, which include stuffy, itchy, runny nose, redness, swelling and itching of the eyes, and itching of the ears and throat. Here are some tips on how to avoid pollen exposure.  1. .Keep windows closed and use the air conditioner when possible.  2.  Avoid outside exposure in the early morning as pollen counts  are highest at that time.  3.  Take a shower and wash hair each night.  4.  Consider wearing a mask when working in the yard and/or garden.  5.  Clean furnace filter monthly with HEPA filters. Consider a HEPA filter vacuum  which will prevent pollen from being reintroduced into the air.   DUST MITES  Dust mites can never be entirely eliminated in the house no matter how clean your house is. Dust mites are attracted to warm, moist areas and feed on dead skin flakes. Here are tips to minimize dust mites in your home.  1.  Encase pillows and mattress/box springs in zippered allergy covers.  2.  Wash bedding in hot water (at least 130 F) every 7-14 days.  3.  Avoid curtains, carpet, and upholstered furniture if possible.  4.  Use HEPA air filters and a HEPA filter vacuum . Change filters monthly. Vacuum weekly.  5.  Keep bedroom simple, avoiding clutter, so it can quickly be dusted.  6.  Cover heating vents with vent filters.  7.  Keep stuffed toys in a closed container and wash or freeze regularly.  8.  Keep clothing in the closet with the door closed.  PETS  Pets present many problems for people with allergies. Dander from pets is very difficult to remove and also is a food source for dust mites.  1.  If possible, find the pet a new home.  2.  If not possible, keep the pet outdoors. Never allow the pet into the bedroom.  3.  Wash pet weekly in warm water.  4.  Encase mattresses, pillows, and box springs in allergen-proof covers.  5.  Use HEPA air filters and a HEPA filter vacuum . Change filters monthly.      Controlling Allergens: Dust Mites  Constant exposure to allergens means constant allergy symptoms. That s why controlling or avoiding the allergens that cause your symptoms is an important part of your treatment. If you are allergic to dust mites, the tips below can help to lessen your exposure to dust mites.     Wash all bedding in hot water.   Dust mite allergy  Dust mites are a common  cause of nasal allergies. These mites are tiny organisms that live in bedding, upholstered furniture, and carpet. They live in warm, humid conditions. House-dust mites are almost impossible to get rid of. But you can keep them under control.  Make changes to your home  Some furniture, like sofas and chairs, hold dust mites. To lessen the problem:    Choose nonfabric upholstery, like leather or vinyl.    Replace horizontal blinds with pull-down shades or vertical blinds.    Use washable curtains instead of heavy drapes.    Have as little carpeting as possible.    Cover your mattress, box spring, and pillows in allergy-proof casings.  Housecleaning  Here are some tips:    Wash sheets, blankets, and mattress pads every 1 to 2 weeks in hot water (at least 130 F).    Remove stuffed animals and other things that collect dust, such as wall hangings, knickknacks, and books--especially in the bedroom.    Dust your home every week with a damp cloth. Vacuum once a week. Use HEPA (high efficiency particulate air) filters or double-ply bags in the vacuum . Or, use a vacuum designed to lessen allergens.    If someone else can t dust and vacuum for you, wearing a filter mask may help.  Reduce indoor humidity  Dust mites need moist air to live. Use a dehumidifier to reduce air moisture. Don t use humidifiers, or vaporizers.  Talk with your healthcare provider about other ways to reduce dust in your home. Ask about medicines that can help with your allergy symptoms.  Date Last Reviewed: 9/1/2016 2000-2017 The [employer]. [address] [place] [address], [place] [address]. All rights reserved. This information is not intended as a substitute for professional medical care. Always follow your healthcare professional's instructions.        Controlling Allergens: Dust Mites in the Bedroom    Many people with asthma are allergic to dust mites. Dust mites are tiny bugs that live in warm, damp places. They are too small to see,  but they live in mattresses, pillows, upholstered furniture, and house dust. Dust mite allergy can cause asthma flare-ups. If you have this allergy, there are many steps you can take to control dust mites at home.  Take these steps to control dust mites in your bed and bedroom:  1. Keep all clothing in a closet, with the door shut.  2. Make sure the room is not too humid. It should be below 50% humidity.  3. Choose wood, leather, or vinyl for furniture instead of upholstery.  4. Wash all bedding, pillows, and stuffed toys in hot water (130 F) every week. Remove any items that cannot be washed.  5. Use special covers on pillows, mattresses, and box springs. These covers are made for people with allergies.  6. If you can, replace carpeting with tile or hardwood negrito. Use washable throw rugs, or don't use rugs at all.  7. Dust furniture with a damp cloth at least once a week.  8. Use an air conditioner or a dehumidifier to reduce humidity and filter the air. Clean the filter regularly.  9. Use pull-down shades or vertical window blinds that can be easily cleaned. Use these instead of curtains or drapes.  10. Use filters over heater vents.  Date Last Reviewed: 10/1/2016    0519-6188 [employer]. [address] [place] [address]. All rights reserved. This information is not intended as a substitute for professional medical care. Always follow your healthcare professional's instructions.                Follow-ups after your visit        Who to contact     If you have questions or need follow up information about today's clinic visit or your schedule please contact [facility] directly at [phone].  Normal or non-critical lab and imaging results will be communicated to you by MyChart, letter or phone within 4 business days after the clinic has received the results. If you do not hear from us within 7 days, please contact the clinic through MyChart or phone. If you have a critical  "or abnormal lab result, we will notify you by phone as soon as possible.  Submit refill requests through [employer] or call your pharmacy and they will forward the refill request to us. Please allow 3 business days for your refill to be completed.          Additional Information About Your Visit        [employer] Information     [employer] gives you secure access to your electronic health record. If you see a primary care provider, you can also send messages to your care team and make appointments. If you have questions, please call your primary care clinic.  If you do not have a primary care provider, please call [phone] and they will assist you.        Care EveryWhere ID     This is your Care EveryWhere ID. This could be used by other organizations to access your [facility] medical records  [id number]        Your Vitals Were     Pulse Height Pulse Oximetry BMI (Body Mass Index)          98 1.58 m (5' 2.21\") 97% 32.16 kg/m2         Blood Pressure from Last 3 Encounters:   08/07/17 121/72   07/06/17 112/70   06/02/17 131/84    Weight from Last 3 Encounters:   08/07/17 80.3 kg (177 lb)   07/06/17 80.3 kg (177 lb)   06/02/17 80.7 kg (178 lb)              We Performed the Following     ALLERGY SKIN TESTS,ALLERGENS     Spirometry, Breathing Capacity          Today's Medication Changes          These changes are accurate as of: 8/7/17  4:39 PM.  If you have any questions, ask your nurse or doctor.               Start taking these medicines.        Dose/Directions    albuterol 108 (90 BASE) MCG/ACT Inhaler   Commonly known as:  PROAIR HFA/PROVENTIL HFA/VENTOLIN HFA   Used for:  Chest tightness   Started by:  Dinesh Mata DO        Dose:  2 puff   Inhale 2 puffs into the lungs every 4 hours as needed   Quantity:  2 Inhaler   Refills:  3       azelastine 0.1 % spray   Commonly known as:  ASTELIN   Used for:  Rhinoconjunctivitis   Started by:  Dinesh Mata DO        Dose:  1 spray   Spray 1 spray into both nostrils " 2 times daily   Quantity:  1 Bottle   Refills:  11            Where to get your medicines      These medications were sent to [facility] - [place], [place] - [address]  [address] [address]     Phone:  [phone]     albuterol 108 (90 BASE) MCG/ACT Inhaler    azelastine 0.1 % spray                Primary Care Provider Office Phone # Fax #    Genoakarla Keane [phone]       [facility] [address]  [facility] [address]        Equal Access to Services     [facility]: Hadii aad ku hadasho Soomaali, waaxda luqadaha, qaybta kaalmada adeegyada, robert sepulveda hayaadrake brito kharakarie wiley . So [facility] [phone].    ATENCIÓN: Si habla español, tiene a valiente disposición servicios gratuitos de asistencia lingüística. Ll[facility] [phone].    We comply with applicable federal civil rights laws and [place] laws. We do not discriminate on the basis of race, color, national origin, age, disability sex, sexual orientation or gender identity.            Thank you!     Thank you for choosing [facility]  for your care. Our goal is always to provide you with excellent care. Hearing back from our patients is one way we can continue to improve our services. Please take a few minutes to complete the written survey that you may receive in the mail after your visit with us. Thank you!             Your Updated Medication List - Protect others around you: Learn how to safely use, store and throw away your medicines at www.disposemymeds.org.          This list is accurate as of: 8/7/17  4:39 PM.  Always use your most recent med list.                   Brand Name Dispense Instructions for use Diagnosis    albuterol 108 (90 BASE) MCG/ACT Inhaler    PROAIR HFA/PROVENTIL HFA/VENTOLIN HFA    2 Inhaler    Inhale 2 puffs into the lungs every 4 hours as needed    Chest tightness       ALLEGRA PO           azelastine 0.1 % spray    ASTELIN    1 Bottle     Spray 1 spray into both nostrils 2 times daily    Rhinoconjunctivitis       cetirizine 10 MG tablet    zyrTEC    30 tablet    Take 1 tablet (10 mg) by mouth every evening    Seasonal allergic rhinitis due to other allergic trigger       fluticasone 50 MCG/ACT spray    FLONASE    16 mL    USE TWO SPRAYS IN EACH NOSTRIL DAILY    Chronic rhinitis       loratadine 10 MG tablet    CLARITIN    30 tablet    Take 1 tablet (10 mg) by mouth every morning    Seasonal allergic rhinitis due to other allergic trigger       montelukast 10 MG tablet    SINGULAIR    30 tablet    Take 1 tablet (10 mg) by mouth At Bedtime    Seasonal allergic rhinitis due to other allergic trigger

## 2017-08-07 NOTE — PROGRESS NOTES
Kody Benton is a 57 year old White male with previous medical history significant for H. pylori, esophageal web, Down syndrome and chronic sinusitis. Kody Benton is being seen today for evaluation of asthma and seasonal allergies. Patient is being seen in consultation at the request of Dr. Huang Keane MD.    The patient reports that since he was a teenager he has had perennial nasal and ocular symptoms. No seasonal worsening. He has 4 dogs and 2 cats and one bird in his house. Symptoms include rhinorrhea, nasal itching, sneezing, congestion, postnasal drip, poor sense of smell, sinus headaches, ocular itching, ocular watering, ocular redness, ocular burning. Loratadine and cetirizine caused fatigue. Fexofenadine has been beneficial and did not make him sleepy. He has been on Flonase 2 sprays per nostril daily for the last 2 months and reports this has been beneficial as well. He has 75 percent improvement in his symptoms with these medications. He additionally is on montelukast 10 mg by mouth daily. Symptoms are present indoor and outdoor. Symptoms are additionally made worse by dust, cold, smoke. He underwent CT scan of his sinuses which showed maxillary sinusitis bilaterally. He was treated with prednisone and Augmentin. This was beneficial, but still has some symptoms. No ENT evaluation. He has had an allergy evaluation multiple years ago, but he does not recall what he was allergic to. He has never been on allergy shots.    The patient reports that with aerobic exercise he will develop shortness of breath, tightness in chest and coughing. This improved since starting montelukast. He has never officially been diagnosed with asthma. He's never used a steroid inhaler. He's never used an albuterol inhaler. No hospitalizations or ER visits for chest symptoms. No other triggers for chest symptoms.    Patient had a choking episode on a piece of pork. He underwent endoscopy which showed an esophageal web.  Biopsies for eosinophilic esophagitis. He additionally was diagnosed with H. pylori and treated. He has not subsequent followed up with a gastroenterologist.    The patient has no history of asthma, eczema, food allergies, medications allergies or hives.     ENVIRONMENTAL HISTORY: [place] The home is heated with a forced air. They does have central air conditioning. The patient's bedroom is furnished with Indoor plants and carpeting in bedroom.  Pets inside the house include 4 dog(s) 2 cats 1 parrot. There is not history of cockroach or mice infestation. There is/are 0 smokers in the house.  The house does not have a damp basement.         History reviewed. No pertinent past medical history.  History reviewed. No pertinent family history.  History reviewed. No pertinent surgical history.    REVIEW OF SYSTEMS:  General: negative for weight gain. negative for weight loss. negative for changes in sleep.   Ears: negative for fullness. negative for hearing loss. negative for dizziness.   Nose: negative for snoring.negative for changes in smell. positive  for drainage.   Eyes: negative for eye watering. positive  for eye itching. negative for vision changes. negative for eye redness.  Throat: negative for hoarseness. negative for sore throat. negative for trouble swallowing.   Lungs: negative for shortness of breath.negative for wheezing. positive  for sputum production.   Cardiovascular: negative for chest pain. positive  for swelling of ankles. negative for fast or irregular heartbeat.   Gastrointestinal: negative for nausea. negative for heartburn. positive  for acid reflux.   Musculoskeletal: negative for joint pain. negative for joint stiffness. negative for joint swelling.   Neurologic: negative for seizures. negative for fainting. negative for weakness.   Psychiatric: negative  for changes in mood. positive  for anxiety.   Endocrine: positive  for cold intolerance.  negative for heat intolerance. negative for tremors.   Lymphatic: negative for lower extremity swelling. negative for lymph node swelling.   Hematologic: negative for easy bruising. negative for easy bleeding.  Integumentary: negative for rash. negative for scaling. negative for nail changes.       Current Outpatient Prescriptions:      Fexofenadine HCl (ALLEGRA PO), , Disp: , Rfl:      albuterol (PROAIR HFA/PROVENTIL HFA/VENTOLIN HFA) 108 (90 BASE) MCG/ACT Inhaler, Inhale 2 puffs into the lungs every 4 hours as needed, Disp: 2 Inhaler, Rfl: 3     azelastine (ASTELIN) 0.1 % spray, Spray 1 spray into both nostrils 2 times daily, Disp: 1 Bottle, Rfl: 11     loratadine (CLARITIN) 10 MG tablet, Take 1 tablet (10 mg) by mouth every morning, Disp: 30 tablet, Rfl: 11     cetirizine (ZYRTEC) 10 MG tablet, Take 1 tablet (10 mg) by mouth every evening, Disp: 30 tablet, Rfl: 11     fluticasone (FLONASE) 50 MCG/ACT spray, USE TWO SPRAYS IN EACH NOSTRIL DAILY, Disp: 16 mL, Rfl: 4     montelukast (SINGULAIR) 10 MG tablet, Take 1 tablet (10 mg) by mouth At Bedtime (Patient not taking: Reported on 8/7/2017), Disp: 30 tablet, Rfl: 11  Immunization History   Administered Date(s) Administered     Influenza (IIV3) 09/16/2015     Influenza Vaccine, 3 YRS +, IM (QUADRIVALENT W/PRESERVATIVES) 08/17/2016     Pneumococcal (PCV 13) 10/08/2015     No Known Allergies      EXAM:   Constitutional:  Appears well-developed and well-nourished. No distress.   HEENT:   Head: Normocephalic.   Right Ear: External ear normal. TM normal  Left Ear: External ear normal. TM normal  Mouth/Throat: No oropharyngeal exudate present.   No cobblestoning of posterior oropharynx.   Boggy nasal tissue and pale.    Eyes: Conjunctivae are non-erythematous   Ocular watering noted  No maxillary or frontal sinus tenderness to palpation.   Cardiovascular: Normal rate, regular rhythm and normal heart sounds. Exam reveals no gallop and no friction rub.   No murmur  heard.  Respiratory: Effort normal and breath sounds normal. No respiratory distress. No wheezes. No rales.   Musculoskeletal: Normal range of motion.   Lymphadenopathy:   No cervical adenopathy.   No lower extremity edema.   Neuro: Oriented to person, place, and time.  Skin: Skin is warm and dry. No rash noted.   Psychiatric: Normal mood and affect.     Nursing note and vitals reviewed.      WORKUP:   Skin testing  Positive for cat, dust mites, grass, trees, weeds, and mold.     Spirometry  FVC % pred:105  FEV1 % pred:113  FEV1/FVC % act:82  FEF 25-75% pred:148    Normal spirometry.     ASSESSMENT/PLAN:  Problem List Items Addressed This Visit        Nervous and Auditory    Chest tightness - Primary     Chest tightness, coughing and shortness of breath that occurs with moderate exertion. This has improved since starting montelukast, but not completely. He has never used albuterol. No other triggers. He has never officially been diagnosed with asthma.    Spirometry normal.       - Albuterol 2-4 puffs inhaled (use a spacer unless using a Proair Respiclick device) every 4 hours as needed for chest tightness, wheezing, shortness of breath and/or coughing.   - Albuterol 2-4 puffs inhaled (use spacer if not using Proair Respiclick device) 15-20 minutes prior to physical activity.   - Please ensure warm up period prior to exercise.   - Avoid asthma triggers.             Relevant Medications    albuterol (PROAIR HFA/PROVENTIL HFA/VENTOLIN HFA) 108 (90 BASE) MCG/ACT Inhaler    Other Relevant Orders    Spirometry, Breathing Capacity (Completed)       Respiratory    Allergic rhinitis due to cat hair     Nasal and ocular symptoms that are perennial with no seasonal worsening. Treated with fexofenadine, Flonase and montelukast and has had 75% of improvement. Recently treated for maxillary sinusitis and had significant improvement posttreatment and now back to baseline.    Skin testing positive for cat, dust, grass, trees and  weeds. Additionally positive for mold.    - Allergen avoidance measures were discussed and literature provided.  - Flonase 2 sprays per nostril daily.  - Singular 10 mg by mouth daily.  - Continue Allegra and her native milligrams by mouth daily.  - Azelastine 2 sprays/nostril twice daily as needed.   - Consider immunotherapy.           Relevant Medications    Fexofenadine HCl (ALLEGRA PO)    albuterol (PROAIR HFA/PROVENTIL HFA/VENTOLIN HFA) 108 (90 BASE) MCG/ACT Inhaler    azelastine (ASTELIN) 0.1 % spray    Other Relevant Orders    ALLERGY SKIN TESTS,ALLERGENS (Completed)    Allergic rhinitis due to mold    Relevant Medications    Fexofenadine HCl (ALLEGRA PO)    albuterol (PROAIR HFA/PROVENTIL HFA/VENTOLIN HFA) 108 (90 BASE) MCG/ACT Inhaler    azelastine (ASTELIN) 0.1 % spray    Allergic rhinitis due to dust mite    Relevant Medications    Fexofenadine HCl (ALLEGRA PO)    albuterol (PROAIR HFA/PROVENTIL HFA/VENTOLIN HFA) 108 (90 BASE) MCG/ACT Inhaler    azelastine (ASTELIN) 0.1 % spray    RESOLVED: Chronic rhinitis    Relevant Medications    Fexofenadine HCl (ALLEGRA PO)    albuterol (PROAIR HFA/PROVENTIL HFA/VENTOLIN HFA) 108 (90 BASE) MCG/ACT Inhaler    azelastine (ASTELIN) 0.1 % spray       Digestive    Esophageal web determined by endoscopy     Continue follow up with GI. Negative biopsy for eosinophilic esophagitis. I reviewed this biopsy.               Chart documentation with Dragon Voice recognition Software. Although reviewed after completion, some words and grammatical errors may remain.    Dinesh Mata,    Allergy/Immunology  [facility], [place] and [place]

## 2017-08-07 NOTE — Clinical Note
Dr. Keane,   I saw Kody today as a new patient. He will continue Flonase, montelukast and Allegra. Started on Astelin 2 sprays per nostril twice daily. Allergy testing was positive for cat, dust, grass, trees and weeds. He was initially positive for molds. He would be an excellent candidate for allergy shots. I also prescribed albuterol inhaler for shortness of breath that he has had with exertion. Please see my note for full details. I will see him back in 6 weeks. Thank you. Dinesh Mata

## 2017-08-07 NOTE — ASSESSMENT & PLAN NOTE
Nasal and ocular symptoms that are perennial with no seasonal worsening. Treated with fexofenadine, Flonase and montelukast and has had 75% of improvement. Recently treated for maxillary sinusitis and had significant improvement posttreatment and now back to baseline.    Skin testing positive for cat, dust, grass, trees and weeds. Additionally positive for mold.    - Allergen avoidance measures were discussed and literature provided.  - Flonase 2 sprays per nostril daily.  - Singular 10 mg by mouth daily.  - Continue Allegra and her native milligrams by mouth daily.  - Azelastine 2 sprays/nostril twice daily as needed.   - Consider immunotherapy.

## 2017-08-07 NOTE — NURSING NOTE
"Chief Complaint   Patient presents with     Consult       Initial /72 (BP Location: Right arm, Patient Position: Sitting, Cuff Size: Adult Regular)  Pulse 98  Ht 5' 2.21\" (1.58 m)  Wt 177 lb (80.3 kg)  SpO2 97%  BMI 32.16 kg/m2 Estimated body mass index is 32.16 kg/(m^2) as calculated from the following:    Height as of this encounter: 5' 2.21\" (1.58 m).    Weight as of this encounter: 177 lb (80.3 kg).  Medication Reconciliation: complete   Sangita Lester MA      "

## 2017-08-07 NOTE — NURSING NOTE
Per provider verbal order, placed Adult Environmental Panel scratch test.  Consent was obtained prior to procedure.  Once panels were placed, patient was monitored for 15 minutes in clinic.  RN read test after 15 minutes and provider was notified of results.  Pt tolerated procedure well.  All questions and concerns were addressed at office visit.       Mojgan Carter RN

## 2017-08-07 NOTE — ASSESSMENT & PLAN NOTE
Chest tightness, coughing and shortness of breath that occurs with moderate exertion. This has improved since starting montelukast, but not completely. He has never used albuterol. No other triggers. He has never officially been diagnosed with asthma.    Spirometry normal.       - Albuterol 2-4 puffs inhaled (use a spacer unless using a Proair Respiclick device) every 4 hours as needed for chest tightness, wheezing, shortness of breath and/or coughing.   - Albuterol 2-4 puffs inhaled (use spacer if not using Proair Respiclick device) 15-20 minutes prior to physical activity.   - Please ensure warm up period prior to exercise.   - Avoid asthma triggers.

## 2017-08-08 ASSESSMENT — ASTHMA QUESTIONNAIRES: ACT_TOTALSCORE: 9

## 2018-01-30 ENCOUNTER — TELEPHONE (OUTPATIENT)
Dept: FAMILY MEDICINE | Facility: CLINIC | Age: 58
End: 2018-01-30

## 2018-01-30 ENCOUNTER — MYC MEDICAL ADVICE (OUTPATIENT)
Dept: FAMILY MEDICINE | Facility: CLINIC | Age: 58
End: 2018-01-30

## 2018-01-30 NOTE — TELEPHONE ENCOUNTER
Panel Management Review      BP Readings from Last 1 Encounters:   08/07/17 121/72    , No results found for: A1C, Visit date not found  Last Office Visit with this department: Visit date not found    Fail List measure: Panel Management      Patient is due/failing the following:   COLONOSCOPY    Action needed:   Schedule colonoscopy    Type of outreach:    Sent [employer] message.    Questions for provider review:    None                                                                                                                                    Starr Edward      Chart routed to  .

## 2018-02-01 ENCOUNTER — OFFICE VISIT (OUTPATIENT)
Dept: FAMILY MEDICINE | Facility: CLINIC | Age: 58
End: 2018-02-01
Payer: COMMERCIAL

## 2018-02-01 ENCOUNTER — RADIANT APPOINTMENT (OUTPATIENT)
Dept: GENERAL RADIOLOGY | Facility: CLINIC | Age: 58
End: 2018-02-01
Attending: INTERNAL MEDICINE
Payer: COMMERCIAL

## 2018-02-01 VITALS
DIASTOLIC BLOOD PRESSURE: 62 MMHG | WEIGHT: 181 LBS | TEMPERATURE: 99 F | HEIGHT: 62 IN | OXYGEN SATURATION: 98 % | BODY MASS INDEX: 33.31 KG/M2 | HEART RATE: 78 BPM | SYSTOLIC BLOOD PRESSURE: 103 MMHG

## 2018-02-01 DIAGNOSIS — J32.0 LEFT MAXILLARY SINUSITIS: ICD-10-CM

## 2018-02-01 DIAGNOSIS — R68.89 FLU-LIKE SYMPTOMS: Primary | ICD-10-CM

## 2018-02-01 LAB
FLUAV+FLUBV AG SPEC QL: NEGATIVE
FLUAV+FLUBV AG SPEC QL: NEGATIVE
SPECIMEN SOURCE: NORMAL

## 2018-02-01 PROCEDURE — 70220 X-RAY EXAM OF SINUSES: CPT | Mod: FY

## 2018-02-01 PROCEDURE — 99214 OFFICE O/P EST MOD 30 MIN: CPT | Performed by: INTERNAL MEDICINE

## 2018-02-01 PROCEDURE — 87804 INFLUENZA ASSAY W/OPTIC: CPT | Performed by: INTERNAL MEDICINE

## 2018-02-01 ASSESSMENT — PAIN SCALES - GENERAL: PAINLEVEL: MILD PAIN (2)

## 2018-02-01 NOTE — PROGRESS NOTES
SUBJECTIVE:   Kody Benton is a 57 year old male who presents to clinic today for the following health issues:    Acute Illness   Acute illness concerns: Cough possible flu  Onset: 1 weeks+    Fever: x1week but subsided    Chills/Sweats: no     Headache (location?): no     Sinus Pressure:YES around eyes    Conjunctivitis:  no    Ear Pain: no    Rhinorrhea: YES    Congestion: YES    Sore Throat: no      Cough: YES    Wheeze: no     Decreased Appetite: no     Nausea: no     Vomiting: no     Diarrhea:  no     Dysuria/Freq.: no     Fatigue/Achiness: YES    Sick/Strep Exposure: brother in law sick but was sick prior to him     Therapies Tried and outcome: chicked vilmadouglas soup      Problem list and histories reviewed & adjusted, as indicated.  Additional history: as documented    Patient Active Problem List   Diagnosis     Overweight (BMI 25.0-29.9)     Advanced directives, counseling/discussion     Down's syndrome     Unilateral inguinal hernia     Esophageal web determined by endoscopy     Gastritis, Helicobacter pylori     Chest tightness     Allergic rhinitis due to cat hair     Allergic rhinitis due to mold     Allergic rhinitis due to dust mite     History reviewed. No pertinent surgical history.    Social History   Substance Use Topics     Smoking status: Never Smoker     Smokeless tobacco: Never Used     Alcohol use Yes      Comment: socially     History reviewed. No pertinent family history.      No Known Allergies  Recent Labs   Lab Test  10/08/15   1405   LDL  70   HDL  63   TRIG  102   ALT  16   CR  0.94   GFRESTIMATED  83   GFRESTBLACK  >90   GFR Calc     POTASSIUM  3.9      BP Readings from Last 3 Encounters:   02/01/18 103/62   08/07/17 121/72   07/06/17 112/70    Wt Readings from Last 3 Encounters:   02/01/18 181 lb (82.1 kg)   08/07/17 177 lb (80.3 kg)   07/06/17 177 lb (80.3 kg)                      ROS:  C: NEGATIVE for fever, chills, change in weight  I: NEGATIVE for worrisome  "rashes, moles or lesions  E: NEGATIVE for vision changes or irritation  E/M: NEGATIVE for ear, mouth and throat problems  R: NEGATIVE for significant cough or SOB  CV: NEGATIVE for chest pain, palpitations or peripheral edema  GI: NEGATIVE for nausea, abdominal pain, heartburn, or change in bowel habits  : NEGATIVE for frequency, dysuria, or hematuria  M: NEGATIVE for significant arthralgias or myalgia  N: NEGATIVE for weakness, dizziness or paresthesias  E: NEGATIVE for temperature intolerance, skin/hair changes  H: NEGATIVE for bleeding problems  P: NEGATIVE for changes in mood or affect    OBJECTIVE:     /62 (BP Location: Left arm, Patient Position: Chair, Cuff Size: Adult Regular)  Pulse 78  Temp 99  F (37.2  C) (Oral)  Ht 5' 2.2\" (1.58 m)  Wt 181 lb (82.1 kg)  SpO2 98%  BMI 32.89 kg/m2  Body mass index is 32.89 kg/(m^2).  GENERAL: healthy, alert and no distress  EYES: Eyes grossly normal to inspection, PERRL and conjunctivae and sclerae normal  HENT: ear canals and TM's normal, nose and mouth without ulcers or lesions  NECK: no adenopathy, no asymmetry, masses, or scars and thyroid normal to palpation  RESP: lungs clear to auscultation - no rales, rhonchi or wheezes  CV: regular rate and rhythm, normal S1 S2, no S3 or S4, no murmur, click or rub, no peripheral edema and peripheral pulses strong  ABDOMEN: soft, nontender, no hepatosplenomegaly, no masses and bowel sounds normal  MS: no gross musculoskeletal defects noted, no edema  SKIN: no suspicious lesions or rashes  NEURO: Normal strength and tone, mentation intact and speech normal  PSYCH: mentation appears normal, affect normal/bright    Diagnostic Test Results:  Results for orders placed or performed in visit on 02/01/18   XR Sinus Complete G/E 3 Views    Narrative    SINUS THREE VIEWS COMPLETE  2/1/2018 4:25 PM    HISTORY: Sinus pressure.    COMPARISON: 6/2/2017      Impression    IMPRESSION: Negative.    HOMAR SANTOS MD   Influenza A/B " antigen   Result Value Ref Range    Influenza A/B Agn Specimen Nasal     Influenza A Negative NEG^Negative    Influenza B Negative NEG^Negative       ASSESSMENT/PLAN:     (R68.89) Flu-like symptoms  (primary encounter diagnosis)  Comment: Negative test, but still suspected due to symptoms and exposure to brother-in-law, who also has similar symptoms (but did not took Tamiflu as prescribed by other providers).  Plan: Influenza A/B antigen, oseltamivir (TAMIFLU) 75        MG capsule            (J32.0) Left maxillary sinusitis  Comment: X-rays of sinuses reviewed by this provider. Empirical treatment with Augmentin necessary.  Plan: XR Sinus Complete G/E 3 Views,         amoxicillin-clavulanate (AUGMENTIN) 875-125 MG         per tablet    Follow-up visit if condition worsens.    Huang Keane MD  [facility]

## 2018-02-01 NOTE — PATIENT INSTRUCTIONS
At [facility], we strive to deliver an exceptional experience to you, every time we see you.  If you receive a survey in the mail, please send us back your thoughts. We really do value your feedback.    Based on your medical history, these are the current health maintenance/preventive care services that you are due for (some may have been done at this visit.)  Health Maintenance Due   Topic Date Due     TETANUS IMMUNIZATION (SYSTEM ASSIGNED)  07/14/1977     HEPATITIS C SCREENING  07/14/1977     INFLUENZA VACCINE (SYSTEM ASSIGNED)  09/01/2017     FIT Q1 YR  12/22/2017         Suggested websites for health information:  Www.[facility] : Up to date and easily searchable information on multiple topics.  Www.medlineplus.gov : medication info, interactive tutorials, watch real surgeries online  Www.familydoctor.org : good info from the Academy of Family Physicians  Www.cdc.gov : public health info, travel advisories, epidemics (H1N1)  Www.aap.org : children's health info, normal development, vaccinations  Www.health.[facility].[place].us : [place] dept of health, public health issues in [place], N1N1    Your care team:                            Family Medicine Internal Medicine   MD Huang Ozuna MD Shantel Branch-Fleming, MD Katya Georgiev PA-C Nam Ho, MD Pediatrics   DONALD Espinoza, ABBY Mendoza APRN MD Nila Robbins MD Deborah Mielke, MD Kim Thein, APRN CNP      Clinic hours: Monday - Thursday 7 am-7 pm; Fridays 7 am-5 pm.   Urgent care: Monday - Friday 11 am-9 pm; Saturday and Sunday 9 am-5 pm.  Pharmacy : Monday -Thursday 8 am-8 pm; Friday 8 am-6 pm; Saturday and Sunday 9 am-5 pm.     Clinic: [phone]   Pharmacy: [phone]

## 2018-02-01 NOTE — MR AVS SNAPSHOT
After Visit Summary   2/1/2018    Kody Benton    MRN: [id number]           Patient Information     Date Of Birth          1960        Visit Information        Provider Department      2/1/2018 3:00 PM Huang Keane MD [facility]        Today's Diagnoses     Flu-like symptoms    -  1    Left maxillary sinusitis          Care Instructions    At [facility], we strive to deliver an exceptional experience to you, every time we see you.  If you receive a survey in the mail, please send us back your thoughts. We really do value your feedback.    Based on your medical history, these are the current health maintenance/preventive care services that you are due for (some may have been done at this visit.)  Health Maintenance Due   Topic Date Due     TETANUS IMMUNIZATION (SYSTEM ASSIGNED)  07/14/1977     HEPATITIS C SCREENING  07/14/1977     INFLUENZA VACCINE (SYSTEM ASSIGNED)  09/01/2017     FIT Q1 YR  12/22/2017         Suggested websites for health information:  Www.[employer] : Up to date and easily searchable information on multiple topics.  Www.medlineplus.gov : medication info, interactive tutorials, watch real surgeries online  Www.familydoctor.org : good info from the Academy of Family Physicians  Www.cdc.gov : public health info, travel advisories, epidemics (H1N1)  Www.aap.org : children's health info, normal development, vaccinations  Www.health.state.[place].us : [place] dept of health, public health issues in [place], N1N1    Your care team:                            Family Medicine Internal Medicine   MD Huang Ozuna MD Shantel Branch-Fleming, MD Katya Georgiev PA-C Nam Ho, MD Pediatrics   DONALD Espinoza, MD Nila Mcfarlane CNP, MD Deborah Mielke, MD Kim Thein, APRN CNP      Clinic hours: Monday - Thursday 7 am-7 pm; Fridays 7 am-5 pm.   Urgent care: Monday -  "Friday 11 am-9 pm; Saturday and Sunday 9 am-5 pm.  Pharmacy : Monday -Thursday 8 am-8 pm; Friday 8 am-6 pm; Saturday and Sunday 9 am-5 pm.     Clinic: [phone]   Pharmacy: [phone]              Follow-ups after your visit        Who to contact     If you have questions or need follow up information about today's clinic visit or your schedule please contact [facility] directly at [phone].  Normal or non-critical lab and imaging results will be communicated to you by [employer], letter or phone within 4 business days after the clinic has received the results. If you do not hear from us within 7 days, please contact the clinic through [employer] or phone. If you have a critical or abnormal lab result, we will notify you by phone as soon as possible.  Submit refill requests through [employer] or call your pharmacy and they will forward the refill request to us. Please allow 3 business days for your refill to be completed.          Additional Information About Your Visit        MyChart Information     [employer] gives you secure access to your electronic health record. If you see a primary care provider, you can also send messages to your care team and make appointments. If you have questions, please call your primary care clinic.  If you do not have a primary care provider, please call [phone] and they will assist you.        Care EveryWhere ID     This is your Care EveryWhere ID. This could be used by other organizations to access your [place] medical records  [id number]        Your Vitals Were     Pulse Temperature Height Pulse Oximetry BMI (Body Mass Index)       78 99  F (37.2  C) (Oral) 5' 2.2\" (1.58 m) 98% 32.89 kg/m2        Blood Pressure from Last 3 Encounters:   02/01/18 103/62   08/07/17 121/72   07/06/17 112/70    Weight from Last 3 Encounters:   02/01/18 181 lb (82.1 kg)   08/07/17 177 lb (80.3 kg)   07/06/17 177 lb (80.3 kg)              We Performed the Following     " Influenza A/B antigen     XR Sinus Complete G/E 3 Views          Today's Medication Changes          These changes are accurate as of 2/1/18  4:34 PM.  If you have any questions, ask your nurse or doctor.               Start taking these medicines.        Dose/Directions    amoxicillin-clavulanate 875-125 MG per tablet   Commonly known as:  AUGMENTIN   Used for:  Left maxillary sinusitis   Started by:  Huang Keane MD        Dose:  1 tablet   Take 1 tablet by mouth 2 times daily   Quantity:  20 tablet   Refills:  1            Where to get your medicines      These medications were sent to [id number] - [place], [place] - [address]  [address] [address]     Phone:  [phone]     amoxicillin-clavulanate 875-125 MG per tablet                Primary Care Provider Office Phone # Fax #    Huang Keane [phone] [address]  [place] [address]        Equal Access to Services     [facility]: Hadii aad ku hadasho Soomaali, waaxda luqadaha, qaybta kaalmada adeegyada, waxay idiin hayalvinn daryl wiley . So [facility] [phone].    ATENCIÓN: Si habla español, tiene a valiente disposición servicios gratuitos de asistencia lingüística. [facility] [phone].    We comply with applicable federal civil rights laws and [place] laws. We do not discriminate on the basis of race, color, national origin, age, disability, sex, sexual orientation, or gender identity.            Thank you!     Thank you for choosing [facility]  for your care. Our goal is always to provide you with excellent care. Hearing back from our patients is one way we can continue to improve our services. Please take a few minutes to complete the written survey that you may receive in the mail after your visit with us. Thank you!             Your Updated Medication List - Protect others around you: Learn how to safely use, store and throw  away your medicines at www.disposemymeds.org.          This list is accurate as of 2/1/18  4:34 PM.  Always use your most recent med list.                   Brand Name Dispense Instructions for use Diagnosis    albuterol 108 (90 BASE) MCG/ACT Inhaler    PROAIR HFA/PROVENTIL HFA/VENTOLIN HFA    2 Inhaler    Inhale 2 puffs into the lungs every 4 hours as needed    Chest tightness       ALLEGRA PO           amoxicillin-clavulanate 875-125 MG per tablet    AUGMENTIN    20 tablet    Take 1 tablet by mouth 2 times daily    Left maxillary sinusitis       azelastine 0.1 % spray    ASTELIN    1 Bottle    Spray 1 spray into both nostrils 2 times daily    Allergic rhinitis due to cat hair       fluticasone 50 MCG/ACT spray    FLONASE    16 mL    USE TWO SPRAYS IN EACH NOSTRIL DAILY    Chronic rhinitis       montelukast 10 MG tablet    SINGULAIR    30 tablet    Take 1 tablet (10 mg) by mouth At Bedtime    Seasonal allergic rhinitis due to other allergic trigger

## 2018-02-05 RX ORDER — OSELTAMIVIR PHOSPHATE 75 MG/1
75 CAPSULE ORAL 2 TIMES DAILY
Qty: 10 CAPSULE | Refills: 0 | Status: SHIPPED | OUTPATIENT
Start: 2018-02-05 | End: 2018-05-24

## 2018-05-14 ENCOUNTER — TELEPHONE (OUTPATIENT)
Dept: FAMILY MEDICINE | Facility: CLINIC | Age: 58
End: 2018-05-14

## 2018-05-14 NOTE — TELEPHONE ENCOUNTER
Panel Management Review      BP Readings from Last 1 Encounters:   02/01/18 103/62      Last Office Visit with this department: 2/1/2018    Fail List measure:       Patient is due/failing the following:   FIT    Action needed:   Patient needs to complete FIT.    Type of outreach:    Sent [employer] message.    Questions for provider review:    None                                                                                                                                    Sai Claros, CMA

## 2018-05-24 ENCOUNTER — RADIANT APPOINTMENT (OUTPATIENT)
Dept: GENERAL RADIOLOGY | Facility: CLINIC | Age: 58
End: 2018-05-24
Attending: INTERNAL MEDICINE
Payer: COMMERCIAL

## 2018-05-24 ENCOUNTER — OFFICE VISIT (OUTPATIENT)
Dept: FAMILY MEDICINE | Facility: CLINIC | Age: 58
End: 2018-05-24
Payer: COMMERCIAL

## 2018-05-24 VITALS
DIASTOLIC BLOOD PRESSURE: 78 MMHG | HEART RATE: 75 BPM | BODY MASS INDEX: 33.09 KG/M2 | SYSTOLIC BLOOD PRESSURE: 114 MMHG | WEIGHT: 179.8 LBS | HEIGHT: 62 IN | TEMPERATURE: 98.4 F | OXYGEN SATURATION: 97 %

## 2018-05-24 DIAGNOSIS — M25.512 BILATERAL SHOULDER PAIN, UNSPECIFIED CHRONICITY: ICD-10-CM

## 2018-05-24 DIAGNOSIS — Z11.59 NEED FOR HEPATITIS C SCREENING TEST: ICD-10-CM

## 2018-05-24 DIAGNOSIS — R13.19 OTHER DYSPHAGIA: ICD-10-CM

## 2018-05-24 DIAGNOSIS — M65.4 RADIAL STYLOID TENOSYNOVITIS OF RIGHT HAND: ICD-10-CM

## 2018-05-24 DIAGNOSIS — Z00.00 ROUTINE GENERAL MEDICAL EXAMINATION AT A HEALTH CARE FACILITY: Primary | ICD-10-CM

## 2018-05-24 DIAGNOSIS — M25.511 BILATERAL SHOULDER PAIN, UNSPECIFIED CHRONICITY: ICD-10-CM

## 2018-05-24 DIAGNOSIS — Z13.6 SCREENING FOR ISCHEMIC HEART DISEASE: ICD-10-CM

## 2018-05-24 DIAGNOSIS — G47.419 PRIMARY NARCOLEPSY WITHOUT CATAPLEXY: ICD-10-CM

## 2018-05-24 DIAGNOSIS — H93.13 TINNITUS, BILATERAL: ICD-10-CM

## 2018-05-24 DIAGNOSIS — K40.90 LEFT INGUINAL HERNIA: ICD-10-CM

## 2018-05-24 DIAGNOSIS — Z12.11 SCREEN FOR COLON CANCER: ICD-10-CM

## 2018-05-24 DIAGNOSIS — I87.2 VENOUS STASIS DERMATITIS OF RIGHT LOWER EXTREMITY: ICD-10-CM

## 2018-05-24 DIAGNOSIS — Z13.6 CARDIOVASCULAR SCREENING; LDL GOAL LESS THAN 160: ICD-10-CM

## 2018-05-24 LAB
ALBUMIN SERPL-MCNC: 3.4 G/DL (ref 3.4–5)
ALBUMIN UR-MCNC: NEGATIVE MG/DL
ALP SERPL-CCNC: 97 U/L (ref 40–150)
ALT SERPL W P-5'-P-CCNC: 19 U/L (ref 0–70)
ANION GAP SERPL CALCULATED.3IONS-SCNC: 7 MMOL/L (ref 3–14)
APPEARANCE UR: CLEAR
AST SERPL W P-5'-P-CCNC: 18 U/L (ref 0–45)
BASOPHILS # BLD AUTO: 0 10E9/L (ref 0–0.2)
BASOPHILS NFR BLD AUTO: 0.2 %
BILIRUB SERPL-MCNC: 1.1 MG/DL (ref 0.2–1.3)
BILIRUB UR QL STRIP: NEGATIVE
BUN SERPL-MCNC: 20 MG/DL (ref 7–30)
CALCIUM SERPL-MCNC: 8.8 MG/DL (ref 8.5–10.1)
CHLORIDE SERPL-SCNC: 106 MMOL/L (ref 94–109)
CHOLEST SERPL-MCNC: 181 MG/DL
CO2 SERPL-SCNC: 31 MMOL/L (ref 20–32)
COLOR UR AUTO: YELLOW
CREAT SERPL-MCNC: 1 MG/DL (ref 0.66–1.25)
DIFFERENTIAL METHOD BLD: NORMAL
EOSINOPHIL # BLD AUTO: 0.1 10E9/L (ref 0–0.7)
EOSINOPHIL NFR BLD AUTO: 1.7 %
ERYTHROCYTE [DISTWIDTH] IN BLOOD BY AUTOMATED COUNT: 13.2 % (ref 10–15)
GFR SERPL CREATININE-BSD FRML MDRD: 77 ML/MIN/1.7M2
GLUCOSE SERPL-MCNC: 100 MG/DL (ref 70–99)
GLUCOSE UR STRIP-MCNC: NEGATIVE MG/DL
HCT VFR BLD AUTO: 41.3 % (ref 40–53)
HDLC SERPL-MCNC: 70 MG/DL
HGB BLD-MCNC: 13.9 G/DL (ref 13.3–17.7)
HGB UR QL STRIP: ABNORMAL
KETONES UR STRIP-MCNC: NEGATIVE MG/DL
LDLC SERPL CALC-MCNC: 89 MG/DL
LEUKOCYTE ESTERASE UR QL STRIP: NEGATIVE
LYMPHOCYTES # BLD AUTO: 0.8 10E9/L (ref 0.8–5.3)
LYMPHOCYTES NFR BLD AUTO: 16.9 %
MCH RBC QN AUTO: 31.2 PG (ref 26.5–33)
MCHC RBC AUTO-ENTMCNC: 33.7 G/DL (ref 31.5–36.5)
MCV RBC AUTO: 93 FL (ref 78–100)
MONOCYTES # BLD AUTO: 0.4 10E9/L (ref 0–1.3)
MONOCYTES NFR BLD AUTO: 8.5 %
NEUTROPHILS # BLD AUTO: 3.5 10E9/L (ref 1.6–8.3)
NEUTROPHILS NFR BLD AUTO: 72.7 %
NITRATE UR QL: NEGATIVE
NONHDLC SERPL-MCNC: 111 MG/DL
PH UR STRIP: 6 PH (ref 5–7)
PLATELET # BLD AUTO: 163 10E9/L (ref 150–450)
POTASSIUM SERPL-SCNC: 3.9 MMOL/L (ref 3.4–5.3)
PROT SERPL-MCNC: 6.8 G/DL (ref 6.8–8.8)
PSA SERPL-ACNC: 1.01 UG/L (ref 0–4)
RBC # BLD AUTO: 4.46 10E12/L (ref 4.4–5.9)
RBC #/AREA URNS AUTO: NORMAL /HPF
SODIUM SERPL-SCNC: 144 MMOL/L (ref 133–144)
SOURCE: ABNORMAL
SP GR UR STRIP: 1.01 (ref 1–1.03)
TRIGL SERPL-MCNC: 111 MG/DL
UROBILINOGEN UR STRIP-ACNC: 0.2 EU/DL (ref 0.2–1)
WBC # BLD AUTO: 4.8 10E9/L (ref 4–11)
WBC #/AREA URNS AUTO: NORMAL /HPF

## 2018-05-24 PROCEDURE — 80061 LIPID PANEL: CPT | Performed by: INTERNAL MEDICINE

## 2018-05-24 PROCEDURE — 86803 HEPATITIS C AB TEST: CPT | Performed by: INTERNAL MEDICINE

## 2018-05-24 PROCEDURE — 93000 ELECTROCARDIOGRAM COMPLETE: CPT | Performed by: INTERNAL MEDICINE

## 2018-05-24 PROCEDURE — 36415 COLL VENOUS BLD VENIPUNCTURE: CPT | Performed by: INTERNAL MEDICINE

## 2018-05-24 PROCEDURE — 85025 COMPLETE CBC W/AUTO DIFF WBC: CPT | Performed by: INTERNAL MEDICINE

## 2018-05-24 PROCEDURE — 99396 PREV VISIT EST AGE 40-64: CPT | Performed by: INTERNAL MEDICINE

## 2018-05-24 PROCEDURE — 73030 X-RAY EXAM OF SHOULDER: CPT | Mod: RT

## 2018-05-24 PROCEDURE — 81001 URINALYSIS AUTO W/SCOPE: CPT | Performed by: INTERNAL MEDICINE

## 2018-05-24 PROCEDURE — 80053 COMPREHEN METABOLIC PANEL: CPT | Performed by: INTERNAL MEDICINE

## 2018-05-24 PROCEDURE — G0103 PSA SCREENING: HCPCS | Performed by: INTERNAL MEDICINE

## 2018-05-24 PROCEDURE — 86141 C-REACTIVE PROTEIN HS: CPT | Performed by: INTERNAL MEDICINE

## 2018-05-24 RX ORDER — TRIAMCINOLONE ACETONIDE 1 MG/G
CREAM TOPICAL
Qty: 453.6 G | Refills: 5 | Status: SHIPPED | OUTPATIENT
Start: 2018-05-24 | End: 2023-05-16

## 2018-05-24 NOTE — PATIENT INSTRUCTIONS
Preventive Health Recommendations  Male Ages 50   64    Yearly exam:             See your health care provider every year in order to  o   Review health changes.   o   Discuss preventive care.    o   Review your medicines if your doctor has prescribed any.     Have a cholesterol test every 5 years, or more frequently if you are at risk for high cholesterol/heart disease.     Have a diabetes test (fasting glucose) every three years. If you are at risk for diabetes, you should have this test more often.     Have a colonoscopy at age 50, or have a yearly FIT test (stool test). These exams will check for colon cancer.      Talk with your health care provider about whether or not a prostate cancer screening test (PSA) is right for you.    You should be tested each year for STDs (sexually transmitted diseases), if you re at risk.     Shots: Get a flu shot each year. Get a tetanus shot every 10 years.     Nutrition:    Eat at least 5 servings of fruits and vegetables daily.     Eat whole-grain bread, whole-wheat pasta and brown rice instead of white grains and rice.     Talk to your provider about Calcium and Vitamin D.     Lifestyle    Exercise for at least 150 minutes a week (30 minutes a day, 5 days a week). This will help you control your weight and prevent disease.     Limit alcohol to one drink per day.     No smoking.     Wear sunscreen to prevent skin cancer.     See your dentist every six months for an exam and cleaning.     See your eye doctor every 1 to 2 years.  At [facility], we strive to deliver an exceptional experience to you, every time we see you.  If you receive a survey in the mail, please send us back your thoughts. We really do value your feedback.    Based on your medical history, these are the current health maintenance/preventive care services that you are due for (some may have been done at this visit.)  Health Maintenance Due   Topic Date Due     TETANUS IMMUNIZATION  (SYSTEM ASSIGNED)  07/14/1977     HIV SCREEN (SYSTEM ASSIGNED)  07/14/1977     HEPATITIS C SCREENING  07/14/1977     FIT Q1 YR  12/22/2017         Suggested websites for health information:  Www.[facility].org : Up to date and easily searchable information on multiple topics.  Www.medlineplus.gov : medication info, interactive tutorials, watch real surgeries online  Www.familydoctor.org : good info from the Academy of Family Physicians  Www.cdc.gov : public health info, travel advisories, epidemics (H1N1)  Www.aap.org : children's health info, normal development, vaccinations  Www.health.[facility].[place].us : [place] dept of health, public health issues in [place], N1N1    Your care team:                            Family Medicine Internal Medicine   MD Huang Ozuna MD Shantel Branch-Fleming, MD Katya Georgiev PA-C Nam Ho, MD Pediatrics   DONALD Espinoza, ABBY Mendoza APRN CNP   MD Nila Brizuela MD Deborah Mielke, MD Kim Thein, APRN CNP      Clinic hours: Monday - Thursday 7 am-7 pm; Fridays 7 am-5 pm.   Urgent care: Monday - Friday 11 am-9 pm; Saturday and Sunday 9 am-5 pm.  Pharmacy : Monday -Thursday 8 am-8 pm; Friday 8 am-6 pm; Saturday and Sunday 9 am-5 pm.     Clinic: [phone]   Pharmacy: [phone]

## 2018-05-24 NOTE — Clinical Note
Colonoscopy done in [place] [facility] last 7/6/2019 shows tubular adenomas in the transverse colon. Repeat colonoscopy in 5 years. Copy sent to abstracting.

## 2018-05-24 NOTE — MR AVS SNAPSHOT
After Visit Summary   5/24/2018    Kody Benton    MRN: [id number]           Patient Information     Date Of Birth          1960        Visit Information        Provider Department      5/24/2018 1:20 PM Huang Keane MD [facility]        Today's Diagnoses     Routine general medical examination at a health care facility    -  1    Screening for ischemic heart disease        Other dysphagia        Screen for colon cancer        Need for hepatitis C screening test        Left groin pain        Radial styloid tenosynovitis of right hand        Venous stasis dermatitis of right lower extremity        Tinnitus, bilateral        CARDIOVASCULAR SCREENING; LDL GOAL LESS THAN 160        Family history of hyperlipidemia        Tinnitus, unspecified laterality          Care Instructions      Preventive Health Recommendations  Male Ages 50 - 64    Yearly exam:             See your health care provider every year in order to  o   Review health changes.   o   Discuss preventive care.    o   Review your medicines if your doctor has prescribed any.     Have a cholesterol test every 5 years, or more frequently if you are at risk for high cholesterol/heart disease.     Have a diabetes test (fasting glucose) every three years. If you are at risk for diabetes, you should have this test more often.     Have a colonoscopy at age 50, or have a yearly FIT test (stool test). These exams will check for colon cancer.      Talk with your health care provider about whether or not a prostate cancer screening test (PSA) is right for you.    You should be tested each year for STDs (sexually transmitted diseases), if you re at risk.     Shots: Get a flu shot each year. Get a tetanus shot every 10 years.     Nutrition:    Eat at least 5 servings of fruits and vegetables daily.     Eat whole-grain bread, whole-wheat pasta and brown rice instead of white grains and rice.     Talk to your provider about  Calcium and Vitamin D.     Lifestyle    Exercise for at least 150 minutes a week (30 minutes a day, 5 days a week). This will help you control your weight and prevent disease.     Limit alcohol to one drink per day.     No smoking.     Wear sunscreen to prevent skin cancer.     See your dentist every six months for an exam and cleaning.     See your eye doctor every 1 to 2 years.  At [facility], we strive to deliver an exceptional experience to you, every time we see you.  If you receive a survey in the mail, please send us back your thoughts. We really do value your feedback.    Based on your medical history, these are the current health maintenance/preventive care services that you are due for (some may have been done at this visit.)  Health Maintenance Due   Topic Date Due     TETANUS IMMUNIZATION (SYSTEM ASSIGNED)  07/14/1977     HIV SCREEN (SYSTEM ASSIGNED)  07/14/1977     HEPATITIS C SCREENING  07/14/1977     FIT Q1 YR  12/22/2017         Suggested websites for health information:  Www.[facility] : Up to date and easily searchable information on multiple topics.  Www.medlineplus.gov : medication info, interactive tutorials, watch real surgeries online  Www.familydoctor.org : good info from the Academy of Family Physicians  Www.cdc.gov : public health info, travel advisories, epidemics (H1N1)  Www.aap.org : children's health info, normal development, vaccinations  Www.health.state[place] : [place] dept of health, public health issues in [place], N1N1    Your care team:                            Family Medicine Internal Medicine   MD Huang Ozuna MD Shantel Branch-Fleming, MD Katya Georgiev PA-C Nam Ho, MD Pediatrics   DONALD Espinoza, MD Nila Mcfarlane CNP, MD Deborah Mielke, MD Kim Thein, APRN CNP      Clinic hours: Monday - Thursday 7 am-7 pm; Fridays 7 am-5 pm.   Urgent care: Monday - Friday 11 am-9 pm;  Saturday and Sunday 9 am-5 pm.  Pharmacy : Monday -Thursday 8 am-8 pm; Friday 8 am-6 pm; Saturday and Sunday 9 am-5 pm.     Clinic: [phone]   Pharmacy: [phone]            Follow-ups after your visit        Additional Services     GASTROENTEROLOGY ADULT REF PROCEDURE ONLY Other; [facility] [phone]       Last Lab Result: Creatinine (mg/dL)       Date                     Value                 10/08/2015               0.94             ----------  Body mass index is 32.67 kg/(m^2).     Needed:  No  Language:  English    Patient will be contacted to schedule procedure.     Please be aware that coverage of these services is subject to the terms and limitations of your health insurance plan.  Call member services at your health plan with any benefit or coverage questions.  Any procedures must be performed at a [facility] facility OR coordinated by your clinic's referral office.    Please bring the following with you to your appointment:    (1) Any X-Rays, CTs or MRIs which have been performed.  Contact the facility where they were done to arrange for  prior to your scheduled appointment.    (2) List of current medications   (3) This referral request   (4) Any documents/labs given to you for this referral            ORTHOPEDICS ADULT REFERRAL       Your provider has referred you to: [facility]: [facility] [phone]    [email or web address]    Please be aware that coverage of these services is subject to the terms and limitations of your health insurance plan.  Call member services at your health plan with any benefit or coverage questions.      Please bring the following to your appointment:    >>   Any x-rays, CTs or MRIs which have been performed.  Contact the facility where they were done to arrange for  prior to your scheduled appointment.    >>   List of current medications   >>   This referral request   >>   Any  documents/labs given to you for this referral            OTOLARYNGOLOGY REFERRAL       Your provider has referred you to: [facility]: [facility] [phone]   [email or web address]    Please be aware that coverage of these services is subject to the terms and limitations of your health insurance plan.  Call member services at your health plan with any benefit or coverage questions.      Please bring the following with you to your appointment:    (1) Any X-Rays, CTs or MRIs which have been performed.  Contact the facility where they were done to arrange for  prior to your scheduled appointment.   (2) List of current medications  (3) This referral request   (4) Any documents/labs given to you for this referral            OTOLARYNGOLOGY REFERRAL       Your provider has referred you to: [facility]: [facility] [phone]   [email or web address]    Please be aware that coverage of these services is subject to the terms and limitations of your health insurance plan.  Call member services at your health plan with any benefit or coverage questions.      Please bring the following with you to your appointment:    (1) Any X-Rays, CTs or MRIs which have been performed.  Contact the facility where they were done to arrange for  prior to your scheduled appointment.   (2) List of current medications  (3) This referral request   (4) Any documents/labs given to you for this referral                  Future tests that were ordered for you today     Open Future Orders        Priority Expected Expires Ordered    CT Abdomen Pelvis w Contrast Routine  5/24/2019 5/24/2018    XR Esophagram w Upper GI Routine  5/24/2019 5/24/2018            Who to contact     If you have questions or need follow up information about today's clinic visit or your schedule please contact [facility] directly at  "[phone].  Normal or non-critical lab and imaging results will be communicated to you by MyChart, letter or phone within 4 business days after the clinic has received the results. If you do not hear from us within 7 days, please contact the clinic through [employer] or phone. If you have a critical or abnormal lab result, we will notify you by phone as soon as possible.  Submit refill requests through [employer] or call your pharmacy and they will forward the refill request to us. Please allow 3 business days for your refill to be completed.          Additional Information About Your Visit        [employer] Information     [employer] gives you secure access to your electronic health record. If you see a primary care provider, you can also send messages to your care team and make appointments. If you have questions, please call your primary care clinic.  If you do not have a primary care provider, please call [phone] and they will assist you.        Care EveryWhere ID     This is your Care EveryWhere ID. This could be used by other organizations to access your [facility] medical records  [id number]        Your Vitals Were     Pulse Temperature Height Pulse Oximetry BMI (Body Mass Index)       75 98.4  F (36.9  C) (Oral) 5' 2.2\" (1.58 m) 97% 32.67 kg/m2        Blood Pressure from Last 3 Encounters:   05/24/18 114/78   02/01/18 103/62   08/07/17 121/72    Weight from Last 3 Encounters:   05/24/18 179 lb 12.8 oz (81.6 kg)   02/01/18 181 lb (82.1 kg)   08/07/17 177 lb (80.3 kg)              We Performed the Following     CBC with platelets and differential     Comprehensive metabolic panel (BMP + Alb, Alk Phos, ALT, AST, Total. Bili, TP)     CRP cardiac risk     EKG 12-lead complete w/read - Clinics     GASTROENTEROLOGY ADULT REF PROCEDURE ONLY Other; [place] [facility] [phone]     Lipid Profile     ORTHOPEDICS ADULT REFERRAL     OTOLARYNGOLOGY REFERRAL     OTOLARYNGOLOGY REFERRAL     PSA, screen          Today's Medication " Changes          These changes are accurate as of 5/24/18  2:27 PM.  If you have any questions, ask your nurse or doctor.               Start taking these medicines.        Dose/Directions    triamcinolone 0.1 % cream   Commonly known as:  KENALOG   Used for:  Venous stasis dermatitis of right lower extremity   Started by:  Huang Keane MD        Apply sparingly to affected area three times daily as needed   Quantity:  453.6 g   Refills:  5            Where to get your medicines      These medications were sent to [id number] - [place], [place] - [address]  [address] [address]     Phone:  [phone]     triamcinolone 0.1 % cream                Primary Care Provider Office Phone # Fax #    Huang Keane [phone] [address]  [place] [address]        Equal Access to Services     [facility]: Hadii benny darby hadasho Sojimboali, waaxda luqadaha, qaybta kaalmada adeegyada, robert wiley . So [facility] [phone].    ATENCIÓN: Si habla español, tiene a valiente disposición servicios gratuitos de asistencia lingüística. [facility] [phone].    We comply with applicable federal civil rights laws and [place] laws. We do not discriminate on the basis of race, color, national origin, age, disability, sex, sexual orientation, or gender identity.            Thank you!     Thank you for choosing [facility]  for your care. Our goal is always to provide you with excellent care. Hearing back from our patients is one way we can continue to improve our services. Please take a few minutes to complete the written survey that you may receive in the mail after your visit with us. Thank you!             Your Updated Medication List - Protect others around you: Learn how to safely use, store and throw away your medicines at www.disposemymeds.org.          This list is accurate as of 5/24/18  2:27  PM.  Always use your most recent med list.                   Brand Name Dispense Instructions for use Diagnosis    albuterol 108 (90 Base) MCG/ACT Inhaler    PROAIR HFA/PROVENTIL HFA/VENTOLIN HFA    2 Inhaler    Inhale 2 puffs into the lungs every 4 hours as needed    Chest tightness       ALLEGRA PO           azelastine 0.1 % spray    ASTELIN    1 Bottle    Spray 1 spray into both nostrils 2 times daily    Allergic rhinitis due to cat hair       fluticasone 50 MCG/ACT spray    FLONASE    16 mL    USE TWO SPRAYS IN EACH NOSTRIL DAILY    Chronic rhinitis       montelukast 10 MG tablet    SINGULAIR    30 tablet    Take 1 tablet (10 mg) by mouth At Bedtime    Seasonal allergic rhinitis due to other allergic trigger       triamcinolone 0.1 % cream    KENALOG    453.6 g    Apply sparingly to affected area three times daily as needed    Venous stasis dermatitis of right lower extremity

## 2018-05-24 NOTE — PROGRESS NOTES
SUBJECTIVE:   CC: Kody Benton is an 57 year old male who presents for preventative health visit.     Healthy Habits:    Do you get at least three servings of calcium containing foods daily (dairy, green leafy vegetables, etc.)? yes    Amount of exercise or daily activities, outside of work: none    Problems taking medications regularly No    Medication side effects: No    Have you had an eye exam in the past two years? no    Do you see a dentist twice per year? no    Do you have sleep apnea, excessive snoring or daytime drowsiness?Yes daytime drowsiness          Today's PHQ-2 Score:   PHQ-2 ( 1999 Pfizer) 6/2/2017 8/24/2016   Q1: Little interest or pleasure in doing things 0 0   Q2: Feeling down, depressed or hopeless 0 0   PHQ-2 Score 0 0       Abuse: Current or Past(Physical, Sexual or Emotional)- No  Do you feel safe in your environment - Yes    Social History   Substance Use Topics     Smoking status: Never Smoker     Smokeless tobacco: Never Used     Alcohol use Yes      Comment: socially      If you drink alcohol do you typically have >3 drinks per day or >7 drinks per week? No                      Last PSA:   PSA   Date Value Ref Range Status   10/08/2015 1.00 0 - 4 ug/L Final       Reviewed orders with patient. Reviewed health maintenance and updated orders accordingly - Yes  Labs reviewed in EPIC  BP Readings from Last 3 Encounters:   05/30/18 137/80   05/24/18 114/78   02/01/18 103/62    Wt Readings from Last 3 Encounters:   05/30/18 180 lb 9.6 oz (81.9 kg)   05/24/18 179 lb 12.8 oz (81.6 kg)   02/01/18 181 lb (82.1 kg)                  Patient Active Problem List   Diagnosis     Overweight (BMI 25.0-29.9)     Advanced directives, counseling/discussion     Down's syndrome     Unilateral inguinal hernia     Esophageal web determined by endoscopy     Gastritis, Helicobacter pylori     Chest tightness     Allergic rhinitis due to cat hair     Allergic rhinitis due to mold     Allergic rhinitis due to  dust mite     No past surgical history on file.    Social History   Substance Use Topics     Smoking status: Never Smoker     Smokeless tobacco: Never Used     Alcohol use Yes      Comment: socially     No family history on file.      No Known Allergies  Recent Labs   Lab Test  05/24/18   1437  10/08/15   1405   LDL  89  70   HDL  70  63   TRIG  111  102   ALT  19  16   CR  1.00  0.94   GFRESTIMATED  77  83   GFRESTBLACK  >90  >90  African American GFR Calc     POTASSIUM  3.9  3.9        Reviewed and updated as needed this visit by clinical staff  Allergies  Meds         Reviewed and updated as needed this visit by Provider          ROS:  CONSTITUTIONAL: NEGATIVE for fever, chills, change in weight  INTEGUMENTARY/SKIN: NEGATIVE for worrisome rashes, moles or lesions  EYES: NEGATIVE for vision changes or irritation  ENT: NEGATIVE for ear, mouth and throat problems  RESP: NEGATIVE for significant cough or SOB  CV: NEGATIVE for chest pain, palpitations or peripheral edema  GI: NEGATIVE for nausea, abdominal pain, heartburn, or change in bowel habits   male: negative for dysuria, hematuria, decreased urinary stream, erectile dysfunction, urethral discharge  MUSCULOSKELETAL: NEGATIVE for significant arthralgias or myalgia  NEURO: NEGATIVE for weakness, dizziness or paresthesias  ENDOCRINE: NEGATIVE for temperature intolerance, skin/hair changes  HEME/ALLERGY/IMMUNE: NEGATIVE for bleeding problems  PSYCHIATRIC: NEGATIVE for changes in mood or affect    OBJECTIVE:   There were no vitals taken for this visit.  EXAM:  GENERAL: healthy, alert and no distress  EYES: Eyes grossly normal to inspection, PERRL and conjunctivae and sclerae normal  HENT: ear canals and TM's normal, nose and mouth without ulcers or lesions  NECK: no adenopathy, no asymmetry, masses, or scars and thyroid normal to palpation  RESP: lungs clear to auscultation - no rales, rhonchi or wheezes  CV: regular rate and rhythm, normal S1 S2, no S3 or S4, no  murmur, click or rub, no peripheral edema and peripheral pulses strong  ABDOMEN: soft, nontender, no hepatosplenomegaly, no masses and bowel sounds normal  MS: no gross musculoskeletal defects noted, no edema  SKIN: no suspicious lesions or rashes  NEURO: Normal strength and tone, mentation intact and speech normal  PSYCH: mentation appears normal, affect normal/bright    ASSESSMENT/PLAN:   (Z00.00) Routine general medical examination at a health care facility  (primary encounter diagnosis)  Comment:   Plan: PSA, screen, CBC with platelets and         differential, *UA reflex to Microscopic, Urine         Microscopic            (Z13.6) CARDIOVASCULAR SCREENING; LDL GOAL LESS THAN 160  Comment:   Plan: Comprehensive metabolic panel (BMP + Alb, Alk         Phos, ALT, AST, Total. Bili, TP), Lipid Profile            (Z13.6) Screening for ischemic heart disease  Comment:   Plan: EKG 12-lead complete w/read - Clinics, CRP         cardiac risk, CANCELED: CRP cardiac risk            (Z12.11) Screen for colon cancer  Comment:   Plan: GASTROENTEROLOGY ADULT REF PROCEDURE ONLY         Other; [place] [facility] [phone]            (Z11.59) Need for hepatitis C screening test  Comment:   Plan: Hepatitis C antibody            (R13.19) Other dysphagia  Comment:   Plan: XR Esophagram w Upper GI            (K40.90) Left inguinal hernia  Comment:   Plan: CT Abdomen Pelvis w Contrast, GENERAL SURG         ADULT REFERRAL            (M65.4) Radial styloid tenosynovitis of right hand  Comment:   Plan: ORTHOPEDICS ADULT REFERRAL            (I87.2) Venous stasis dermatitis of right lower extremity  Comment:   Plan: triamcinolone (KENALOG) 0.1 % cream            (H93.13) Tinnitus, bilateral  Comment:   Plan: OTOLARYNGOLOGY REFERRAL            (M25.511,  M25.512) Bilateral shoulder pain, unspecified chronicity  Comment:   Plan: XR Shoulder Bilateral G/E 2 Views            (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  Comment:   Plan: SLEEP  "EVALUATION & MANAGEMENT REFERRAL - ADULT         -[facility] [place] [facility]          [phone] (Age 15 and up)              COUNSELING:  Special attention given to:        Regular exercise       Healthy diet/nutrition       Consider Hep C screening for patients born between 1945 and 1965       Colon cancer screening       Prostate cancer screening       The 10-year ASCVD risk score (Yo MARADIAGA Jr, et al., 2013) is: 5.7%    Values used to calculate the score:      Age: 57 years      Sex: Male      Is Non- : No      Diabetic: No      Tobacco smoker: No      Systolic Blood Pressure: 137 mmHg      Is BP treated: No      HDL Cholesterol: 70 mg/dL      Total Cholesterol: 181 mg/dL       reports that he has never smoked. He has never used smokeless tobacco.    Estimated body mass index is 32.89 kg/(m^2) as calculated from the following:    Height as of 2/1/18: 5' 2.2\" (1.58 m).    Weight as of 2/1/18: 181 lb (82.1 kg).   Weight management plan: diet and exercise.    Counseling Resources:  ATP IV Guidelines  Pooled Cohorts Equation Calculator  FRAX Risk Assessment  ICSI Preventive Guidelines  Dietary Guidelines for Americans, 2010  USDA's MyPlate  ASA Prophylaxis  Lung CA Screening    Huang Keane MD  [facility]  "

## 2018-05-25 ENCOUNTER — RADIANT APPOINTMENT (OUTPATIENT)
Dept: CT IMAGING | Facility: CLINIC | Age: 58
End: 2018-05-25
Attending: INTERNAL MEDICINE
Payer: COMMERCIAL

## 2018-05-25 DIAGNOSIS — R10.32 LEFT GROIN PAIN: ICD-10-CM

## 2018-05-25 LAB
CRP SERPL HS-MCNC: 1.8 MG/L
HCV AB SERPL QL IA: NONREACTIVE

## 2018-05-25 PROCEDURE — 74177 CT ABD & PELVIS W/CONTRAST: CPT | Performed by: RADIOLOGY

## 2018-05-25 RX ORDER — IOPAMIDOL 755 MG/ML
109 INJECTION, SOLUTION INTRAVASCULAR ONCE
Status: COMPLETED | OUTPATIENT
Start: 2018-05-25 | End: 2018-05-25

## 2018-05-25 RX ADMIN — IOPAMIDOL 109 ML: 755 INJECTION, SOLUTION INTRAVASCULAR at 10:28

## 2018-05-30 ENCOUNTER — OFFICE VISIT (OUTPATIENT)
Dept: ORTHOPEDICS | Facility: CLINIC | Age: 58
End: 2018-05-30
Payer: COMMERCIAL

## 2018-05-30 VITALS
SYSTOLIC BLOOD PRESSURE: 137 MMHG | HEART RATE: 88 BPM | HEIGHT: 62 IN | BODY MASS INDEX: 33.23 KG/M2 | WEIGHT: 180.6 LBS | DIASTOLIC BLOOD PRESSURE: 80 MMHG

## 2018-05-30 DIAGNOSIS — M25.511 CHRONIC RIGHT SHOULDER PAIN: Primary | ICD-10-CM

## 2018-05-30 DIAGNOSIS — M75.41 IMPINGEMENT SYNDROME, SHOULDER, RIGHT: ICD-10-CM

## 2018-05-30 DIAGNOSIS — G89.29 CHRONIC RIGHT SHOULDER PAIN: Primary | ICD-10-CM

## 2018-05-30 PROCEDURE — 99243 OFF/OP CNSLTJ NEW/EST LOW 30: CPT | Mod: 25 | Performed by: ORTHOPAEDIC SURGERY

## 2018-05-30 PROCEDURE — 20610 DRAIN/INJ JOINT/BURSA W/O US: CPT | Mod: RT | Performed by: ORTHOPAEDIC SURGERY

## 2018-05-30 RX ORDER — TRIAMCINOLONE ACETONIDE 40 MG/ML
40 INJECTION, SUSPENSION INTRA-ARTICULAR; INTRAMUSCULAR ONCE
Qty: 1 ML | Refills: 0 | OUTPATIENT
Start: 2018-05-30 | End: 2018-05-30

## 2018-05-30 ASSESSMENT — PAIN SCALES - GENERAL: PAINLEVEL: MODERATE PAIN (5)

## 2018-05-30 NOTE — PROGRESS NOTES
CHIEF COMPLAINT:   Chief Complaint   Patient presents with     Shoulder Pain     Bilateral shoulder pain. Onset: years. NKI. Hx of dislocation of left shoulder as a child. Pain has gotten worse over time. Pain is at the tip of the shoulders. He will have pain that radiates down his arms at times. He has N/T in arms to finger tips. No treatments. Right shoulder is worse than left.      Kody Benton is seen today in the [facility] for evaluation of bilateral shoulder pain at the request of Dr. Encinas Vocal    HISTORY:  Kody Benton is a 57 year old male, right -hand dominant, who is seen for bilateral shoulder pain that started years ago (right>left). History of left shoulder dislocation as a child. Today he has moderate pain, rated a 5/10. Pain is located over the anterolateral shoulder. Pain is worsened with using the shoulder. He has pain with laying on the shoulder. Sometimes will do shoulder exercises. He has not had any treatments.    Of note: Occasional numbness and tingling down the arms into the fingers. None presently, but numbness and tingling comes on with activities. Occasional neck pain.     Onset: moderate pain  Symptoms have been worsening since that time.  Aggravated by: shoulder range of motion, weightbearing   Relieved by: at rest  Present symptoms: pain with range of motion, lifting  Pain location: anterolateral shoulder  Pain severity: 5/10  Pain quality: aching and sharp  Frequency of symptoms: frequently  Associated symptoms: occasional numbness and tingling down into right fingers. Occasional neck pain.     Treatment up to this point: nothing  Prior history of related problems: history of left shoulder dislocation as a child    Significant Orthopedic past medical history: none  Usual level of recreational activity: sedentary  Usual level of work activity: unknown. Unemployed.    Other PMH:   Patient Active Problem List    Diagnosis Date Noted     Chest  tightness 08/07/2017     Priority: Medium     Allergic rhinitis due to cat hair 08/07/2017     Priority: Medium     Allergic rhinitis due to mold 08/07/2017     Priority: Medium     Allergic rhinitis due to dust mite 08/07/2017     Priority: Medium     Esophageal web determined by endoscopy 08/27/2016     Priority: Medium     Gastritis, Helicobacter pylori 08/27/2016     Priority: Medium     Overweight (BMI 25.0-29.9) 10/08/2015     Priority: Medium     Advanced directives, counseling/discussion 10/08/2015     Priority: Medium     Discussed advance care planning with patient; information given to patient to review. October 8, 2015  Sai Claros MA           Down's syndrome 10/08/2015     Priority: Medium     Unilateral inguinal hernia 10/08/2015     Priority: Medium       Surgical Hx:  has no past surgical history on file.    Medications:   Current Outpatient Prescriptions:      albuterol (PROAIR HFA/PROVENTIL HFA/VENTOLIN HFA) 108 (90 BASE) MCG/ACT Inhaler, Inhale 2 puffs into the lungs every 4 hours as needed, Disp: 2 Inhaler, Rfl: 3     azelastine (ASTELIN) 0.1 % spray, Spray 1 spray into both nostrils 2 times daily, Disp: 1 Bottle, Rfl: 11     Fexofenadine HCl (ALLEGRA PO), , Disp: , Rfl:      fluticasone (FLONASE) 50 MCG/ACT spray, USE TWO SPRAYS IN EACH NOSTRIL DAILY, Disp: 16 mL, Rfl: 4     montelukast (SINGULAIR) 10 MG tablet, Take 1 tablet (10 mg) by mouth At Bedtime, Disp: 30 tablet, Rfl: 11     triamcinolone (KENALOG) 0.1 % cream, Apply sparingly to affected area three times daily as needed, Disp: 453.6 g, Rfl: 5    Allergies: No Known Allergies    Social Hx:  reports that he has never smoked. He has never used smokeless tobacco. He reports that he drinks alcohol. He reports that he does not use illicit drugs.    Family Hx: family history is not on file..    REVIEW OF SYSTEMS: 10 point ROS neg other than the symptoms noted above in the HPI and PMH. Notables include  CONSTITUTIONAL:NEGATIVE for fever,  "chills, change in weight  INTEGUMENTARY/SKIN: NEGATIVE for worrisome rashes, moles or lesions  MUSCULOSKELETAL:See HPI above  NEURO: NEGATIVE for weakness, dizziness or paresthesias    This document serves as a record of the services and decisions personally performed and made by Hugo Hernandez MD. It was created on his behalf by aMrgoth Nair, a trained medical scribe. The creation of this document is based the provider's statements to the medical scribe.    Scribe Margoth Nair 1:55 PM 5/30/2018    PHYSICAL EXAM:  /80  Pulse 88  Ht 1.581 m (5' 2.25\")  Wt 81.9 kg (180 lb 9.6 oz)  BMI 32.77 kg/m2   GENERAL APPEARANCE: healthy, alert, no distress; accompanied by his brother in law  SKIN: no suspicious lesions or rashes  NEURO: Normal strength and tone, mentation intact and speech normal  PSYCH:  mentation appears normal and affect normal, not anxious  RESPIRATORY: No increased work of breathing.  VASCULAR: Radial pulses 2+ and brisk cappillary refill   HANDS: no clubbing or nail pitting, no nodes    MUSCULOSKELETAL:    RIGHT UPPER EXTREMITY:  Sensation intact to light touch in median, radial, ulnar and axillary nerve distributions  Palpable 2+ radial pulse, brisk capillary refill to all fingers, wwp  Intact epl fpl fdp edc wrist flexion/extension biceps triceps deltoid    RIGHT SHOULDER:  Shoulder Inspection: no swelling, bruising, discoloration, or obvious deformity or asymmetry  Tender: greater tuberosity   Range of Motion:   Active: forward flexion 170 degrees, external rotation 50 degrees, internal rotation T10  Strength: forward flexion 5/5, External rotation 5/5  Impingement: all grade 2 positive  Special tests: Empty can: negative, Belly press: negative     LEFT UPPER EXTREMITY:  Sensation intact to light touch in median, radial, ulnar and axillary nerve distributions  Palpable 2+ radial pulse, brisk capillary refill to all fingers, wwp  Intact epl fpl fdp edc wrist flexion/extension biceps triceps " deltoid    LEFT SHOULDER:  Shoulder Inspection: no swelling, bruising, discoloration, or obvious deformity or asymmetry  Tender: nontender to palpation   Range of Motion:   Active: forward flexion 170 degrees, external rotation 50 degrees, internal rotation T8  Strength: forward flexion 5/5, External rotation 5-/5  Impingement: mild  Special tests: Empty can: negative, Belly press: negative     X-RAY INTERPRETATION: 2 views bilateral shoulder obtained 5/24/2018 were reviewed personally in clinic today with the patient. On my review, mild acromio-clavicular degenerative changes left more than the right.       ASSESSMENT: Kody Benton is a 57 year old male, right  -hand dominant with bilateral shoulder pain, likely shoulder impingement syndrome, subacromial bursitis, rotator cuff tendonitis    PLAN:   * Reviewed imaging studies with patient. Also, clinical exam findings. Consistent with bilateral shoulder impingement syndrome, bursitis and tendonintis.    Treatment:    * Rest  * Activity modification - avoid activities that aggravate symptoms or started symptoms at onset.  * NSAIDS - regular use for inflammation, with food, as long as no contra-indications. Please discuss with pcp if needed.  * Ice twice daily to three times daily, 15-20 minutes at a time  * heat may be beneficial prior to exercising  * Physical Therapy for strengthening, stretching and range of motion exercises of rotator cuff and periscapular stabilization.  * Tylenol as needed for pain  * Injections: cortisone injections may be beneficial to help decrease swelling and inflammation within the shoulder or bursa, and decrease pain. With decreased pain, Physical Therapy and exercises will be more effective and efficient. Patient elected to proceed with right shoulder injection only.  * Return to clinic as needed  * consider MRI of the shoulder in future if symptoms persist despite the above regimen of treatment.      PROCEDURE NOTE:  The risks,  perceived benefits and potential complications (including but not limited to: bleeding, infection, pain, scar, damage to adjacent structures, atrophy or necrosis of soft tissue, skin blanching, failure to relieve symptoms, worsening of symptoms, allergic reaction) of injection were discussed with the patient. Questions were addressed and answered.The patient elected to proceed. Written informed consent was obtained. The correct procedural site was identified and confirmed. A RIGHT shoulder subacromial injection was performed using 2mL Kenalog-40 40mg per mL and 7mL (4mL 1% lidocaine, 3mL 0.25% marcaine)  of local anesthetic after sterile prep, to the correct procedural site. Sterile bandaid applied. This was tolerated well by the patient. No apparent complications. Did also discuss that if diabetic, recommend close monitoring of blood sugars over the next week as cortisone injections can temporarily elevate blood sugars.         The information in this document, created by a scribe for me, accurately reflects the services I personally performed and the decisions made by me. I have reviewed and approved this document for accuracy.     Hugo Hernandez M.D., M.S.  Dept. of Orthopaedic Surgery  [facility]

## 2018-05-30 NOTE — LETTER
5/30/2018         RE: Kody Benton  [address] [place] [address]        Dear Colleague,    Thank you for referring your patient, Kody Benton, to the [facility]. Please see a copy of my visit note below.    CHIEF COMPLAINT:   Chief Complaint   Patient presents with     Shoulder Pain     Bilateral shoulder pain. Onset: years. NKI. Hx of dislocation of left shoulder as a child. Pain has gotten worse over time. Pain is at the tip of the shoulders. He will have pain that radiates down his arms at times. He has N/T in arms to finger tips. No treatments. Right shoulder is worse than left.      Kody Benton is seen today in the [facility] for evaluation of bilateral shoulder pain at the request of Dr. Huang Keane    HISTORY:  Kody Benton is a 57 year old male, right -hand dominant, who is seen for bilateral shoulder pain that started years ago (right>left). History of left shoulder dislocation as a child. Today he has moderate pain, rated a 5/10. Pain is located over the anterolateral shoulder. Pain is worsened with using the shoulder. He has pain with laying on the shoulder. Sometimes will do shoulder exercises. He has not had any treatments.    Of note: Occasional numbness and tingling down the arms into the fingers. None presently, but numbness and tingling comes on with activities. Occasional neck pain.     Onset: moderate pain  Symptoms have been worsening since that time.  Aggravated by: shoulder range of motion, weightbearing   Relieved by: at rest  Present symptoms: pain with range of motion, lifting  Pain location: anterolateral shoulder  Pain severity: 5/10  Pain quality: aching and sharp  Frequency of symptoms: frequently  Associated symptoms: occasional numbness and tingling down into right fingers. Occasional neck pain.     Treatment up to this point: nothing  Prior history of related problems: history of left shoulder  dislocation as a child    Significant Orthopedic past medical history: none  Usual level of recreational activity: sedentary  Usual level of work activity: unknown. Unemployed.    Other PMH:   Patient Active Problem List    Diagnosis Date Noted     Chest tightness 08/07/2017     Priority: Medium     Allergic rhinitis due to cat hair 08/07/2017     Priority: Medium     Allergic rhinitis due to mold 08/07/2017     Priority: Medium     Allergic rhinitis due to dust mite 08/07/2017     Priority: Medium     Esophageal web determined by endoscopy 08/27/2016     Priority: Medium     Gastritis, Helicobacter pylori 08/27/2016     Priority: Medium     Overweight (BMI 25.0-29.9) 10/08/2015     Priority: Medium     Advanced directives, counseling/discussion 10/08/2015     Priority: Medium     Discussed advance care planning with patient; information given to patient to review. October 8, 2015  Sai Claros MA           Down's syndrome 10/08/2015     Priority: Medium     Unilateral inguinal hernia 10/08/2015     Priority: Medium       Surgical Hx:  has no past surgical history on file.    Medications:   Current Outpatient Prescriptions:      albuterol (PROAIR HFA/PROVENTIL HFA/VENTOLIN HFA) 108 (90 BASE) MCG/ACT Inhaler, Inhale 2 puffs into the lungs every 4 hours as needed, Disp: 2 Inhaler, Rfl: 3     azelastine (ASTELIN) 0.1 % spray, Spray 1 spray into both nostrils 2 times daily, Disp: 1 Bottle, Rfl: 11     Fexofenadine HCl (ALLEGRA PO), , Disp: , Rfl:      fluticasone (FLONASE) 50 MCG/ACT spray, USE TWO SPRAYS IN EACH NOSTRIL DAILY, Disp: 16 mL, Rfl: 4     montelukast (SINGULAIR) 10 MG tablet, Take 1 tablet (10 mg) by mouth At Bedtime, Disp: 30 tablet, Rfl: 11     triamcinolone (KENALOG) 0.1 % cream, Apply sparingly to affected area three times daily as needed, Disp: 453.6 g, Rfl: 5    Allergies: No Known Allergies    Social Hx:  reports that he has never smoked. He has never used smokeless tobacco. He reports that he drinks  "alcohol. He reports that he does not use illicit drugs.    Family Hx: family history is not on file..    REVIEW OF SYSTEMS: 10 point ROS neg other than the symptoms noted above in the HPI and PMH. Notables include  CONSTITUTIONAL:NEGATIVE for fever, chills, change in weight  INTEGUMENTARY/SKIN: NEGATIVE for worrisome rashes, moles or lesions  MUSCULOSKELETAL:See HPI above  NEURO: NEGATIVE for weakness, dizziness or paresthesias    This document serves as a record of the services and decisions personally performed and made by Hugo Hernandez MD. It was created on his behalf by Margoth Nair, a trained medical scribe. The creation of this document is based the provider's statements to the medical scribe.    Scribe Margoth Nair 1:55 PM 5/30/2018    PHYSICAL EXAM:  /80  Pulse 88  Ht 1.581 m (5' 2.25\")  Wt 81.9 kg (180 lb 9.6 oz)  BMI 32.77 kg/m2   GENERAL APPEARANCE: healthy, alert, no distress; accompanied by his brother in law  SKIN: no suspicious lesions or rashes  NEURO: Normal strength and tone, mentation intact and speech normal  PSYCH:  mentation appears normal and affect normal, not anxious  RESPIRATORY: No increased work of breathing.  VASCULAR: Radial pulses 2+ and brisk cappillary refill   HANDS: no clubbing or nail pitting, no nodes    MUSCULOSKELETAL:    RIGHT UPPER EXTREMITY:  Sensation intact to light touch in median, radial, ulnar and axillary nerve distributions  Palpable 2+ radial pulse, brisk capillary refill to all fingers, wwp  Intact epl fpl fdp edc wrist flexion/extension biceps triceps deltoid    RIGHT SHOULDER:  Shoulder Inspection: no swelling, bruising, discoloration, or obvious deformity or asymmetry  Tender: greater tuberosity   Range of Motion:   Active: forward flexion 170 degrees, external rotation 50 degrees, internal rotation T10  Strength: forward flexion 5/5, External rotation 5/5  Impingement: all grade 2 positive  Special tests: Empty can: negative, Belly press: negative "     LEFT UPPER EXTREMITY:  Sensation intact to light touch in median, radial, ulnar and axillary nerve distributions  Palpable 2+ radial pulse, brisk capillary refill to all fingers, wwp  Intact epl fpl fdp edc wrist flexion/extension biceps triceps deltoid    LEFT SHOULDER:  Shoulder Inspection: no swelling, bruising, discoloration, or obvious deformity or asymmetry  Tender: nontender to palpation   Range of Motion:   Active: forward flexion 170 degrees, external rotation 50 degrees, internal rotation T8  Strength: forward flexion 5/5, External rotation 5-/5  Impingement: mild  Special tests: Empty can: negative, Belly press: negative     X-RAY INTERPRETATION: 2 views bilateral shoulder obtained 5/24/2018 were reviewed personally in clinic today with the patient. On my review, mild acromio-clavicular degenerative changes left more than the right.       ASSESSMENT: Kody Benton is a 57 year old male, right  -hand dominant with bilateral shoulder pain, likely shoulder impingement syndrome, subacromial bursitis, rotator cuff tendonitis    PLAN:   * Reviewed imaging studies with patient. Also, clinical exam findings. Consistent with bilateral shoulder impingement syndrome, bursitis and tendonintis.    Treatment:    * Rest  * Activity modification - avoid activities that aggravate symptoms or started symptoms at onset.  * NSAIDS - regular use for inflammation, with food, as long as no contra-indications. Please discuss with pcp if needed.  * Ice twice daily to three times daily, 15-20 minutes at a time  * heat may be beneficial prior to exercising  * Physical Therapy for strengthening, stretching and range of motion exercises of rotator cuff and periscapular stabilization.  * Tylenol as needed for pain  * Injections: cortisone injections may be beneficial to help decrease swelling and inflammation within the shoulder or bursa, and decrease pain. With decreased pain, Physical Therapy and exercises will be more  effective and efficient. Patient elected to proceed with right shoulder injection only.  * Return to clinic as needed  * consider MRI of the shoulder in future if symptoms persist despite the above regimen of treatment.      PROCEDURE NOTE:  The risks, perceived benefits and potential complications (including but not limited to: bleeding, infection, pain, scar, damage to adjacent structures, atrophy or necrosis of soft tissue, skin blanching, failure to relieve symptoms, worsening of symptoms, allergic reaction) of injection were discussed with the patient. Questions were addressed and answered.The patient elected to proceed. Written informed consent was obtained. The correct procedural site was identified and confirmed. A RIGHT shoulder subacromial injection was performed using 2mL Kenalog-40 40mg per mL and 7mL (4mL 1% lidocaine, 3mL 0.25% marcaine)  of local anesthetic after sterile prep, to the correct procedural site. Sterile bandaid applied. This was tolerated well by the patient. No apparent complications. Did also discuss that if diabetic, recommend close monitoring of blood sugars over the next week as cortisone injections can temporarily elevate blood sugars.         The information in this document, created by a scribe for me, accurately reflects the services I personally performed and the decisions made by me. I have reviewed and approved this document for accuracy.     Hugo Hernandez M.D., M.S.  Dept. of Orthopaedic Surgery  [facility]      The patient's right shoulder was prepped with betadine solution after verification of allergies. Area approximately 10 cm x 10 cm prepped in a sterile fashion. After injection, betadine removed with soap and water and band-aids applied.    4cc Lidocaine 1%  NDC 5208-3514-11, LOT [id number],  2019  3cc Bupivacaine 0.25% NDC 55150-167-10, LOT [id number],  2018  2cc Kenalog 40 NDC 6226-7486-51, LOT [id number],   injected into  patient's right subacromial space without resistance using posterolateral approach by:   Gideon Barth PA-C, CAQ (Ortho)  Supervising Physician: Hugo Hernandez M.D., M.S.  Dept. of Orthopaedic Surgery  [facility]    Again, thank you for allowing me to participate in the care of your patient.        Sincerely,        Hugo Hernandez MD

## 2018-05-30 NOTE — PROGRESS NOTES
The patient's right shoulder was prepped with betadine solution after verification of allergies. Area approximately 10 cm x 10 cm prepped in a sterile fashion. After injection, betadine removed with soap and water and band-aids applied.    4cc Lidocaine 1%  NDC 2761-2013-63, LOT [id number],  2019  3cc Bupivacaine 0.25% NDC 55150-167-10, LOT [id number],  2018  2cc Kenalog 40 NDC 7377-3569-31, LOT [id number],   injected into patient's right subacromial space without resistance using posterolateral approach by:   Gideon Barth PA-C, CAQ (Ortho)  Supervising Physician: Hugo Hernandez M.D., M.S.  Dept. of Orthopaedic Surgery  [facility]

## 2018-05-31 ENCOUNTER — MYC MEDICAL ADVICE (OUTPATIENT)
Dept: FAMILY MEDICINE | Facility: CLINIC | Age: 58
End: 2018-05-31

## 2018-06-14 DIAGNOSIS — J30.81 ALLERGIC RHINITIS DUE TO CAT HAIR: Primary | ICD-10-CM

## 2018-06-14 DIAGNOSIS — J30.89 ALLERGIC RHINITIS DUE TO DUST MITE: ICD-10-CM

## 2018-06-14 DIAGNOSIS — J30.89 ALLERGIC RHINITIS DUE TO MOLD: ICD-10-CM

## 2018-06-14 NOTE — TELEPHONE ENCOUNTER
"Requested Prescriptions   Pending Prescriptions Disp Refills     fexofenadine (ALLEGRA) 180 MG tablet  Last Written Prescription Date:  06/02/17  Last Fill Quantity: 30,  # refills: 11   Last Office Visit with G, P or [facility] prescribing provider:  05/24/18   Future Office Visit:    30 tablet     Antihistamines Protocol Passed    6/14/2018  2:24 PM       Passed - Patient is 3-64 years of age    Apply weight-based dosing for peds patients age 3 - 12 years of age.    Forward request to provider for patients under the age of 3 or over the age of 64.         Passed - Recent (12 mo) or future (30 days) visit within the authorizing provider's specialty    Patient had office visit in the last 12 months or has a visit in the next 30 days with authorizing provider or within the authorizing provider's specialty.  See \"Patient Info\" tab in inbasket, or \"Choose Columns\" in Meds & Orders section of the refill encounter.              "

## 2018-06-15 RX ORDER — FEXOFENADINE HCL 180 MG/1
180 TABLET ORAL DAILY
Qty: 90 TABLET | Refills: 3 | Status: SHIPPED | OUTPATIENT
Start: 2018-06-15 | End: 2019-11-04

## 2018-06-15 NOTE — TELEPHONE ENCOUNTER
"Routing refill request to provider for review/approval because:  Medication is reported/historical. RN unable to fill per [facility] refill protocol due to \"reported\" status. Provider to please review.    Kim Aparicio RN  [facility] Triage          "

## 2018-06-20 ENCOUNTER — THERAPY VISIT (OUTPATIENT)
Dept: PHYSICAL THERAPY | Facility: CLINIC | Age: 58
End: 2018-06-20
Payer: COMMERCIAL

## 2018-06-20 DIAGNOSIS — M25.511 BILATERAL SHOULDER PAIN, UNSPECIFIED CHRONICITY: Primary | ICD-10-CM

## 2018-06-20 DIAGNOSIS — M25.512 BILATERAL SHOULDER PAIN, UNSPECIFIED CHRONICITY: Primary | ICD-10-CM

## 2018-06-20 PROCEDURE — 97161 PT EVAL LOW COMPLEX 20 MIN: CPT | Mod: GP | Performed by: PHYSICAL THERAPIST

## 2018-06-20 PROCEDURE — 97110 THERAPEUTIC EXERCISES: CPT | Mod: GP | Performed by: PHYSICAL THERAPIST

## 2018-06-20 NOTE — PROGRESS NOTES
[facility] Initial Evaluation  Subjective:  Patient is a 57 year old male presenting with rehab left shoulder hpi. The history is provided by the patient. No  was used.   Kody Benton is a 57 year old male with a bilateral shoulders (R>L generally) condition.      This is a recurrent and chronic condition  Patient presents to PT today with c/o Bilateral shoulder pain;  R>L pain.  Patient stated the L shoulder pain started over 4 years;  R shoulder pain this past winter  Denies having previous neck or R shoulder pain.      Patient referred: 5/30/18.  .    Patient reports pain:  Lateral and upper arm (R shooulder;  deep in socket on L side).    Pain is described as aching and burning and is intermittent and reported as 6/10 (with activity).  Associated symptoms:  Painful arc. Pain is worse during the day.  Symptoms are exacerbated by lifting, certain positions, lying on extremity and using arm overhead and relieved by nothing.  Since onset symptoms are unchanged.  Special tests:  X-ray (done by primary PT).  Previous treatment includes other (injection).  There was mild improvement following previous treatment.  General health as reported by patient is fair.  Pertinent medical history includes:  Mental illness, overweight, sleep disorder/apnea and other (Mosaic Down Syndrome,  Klienfelters syndrome, ADHD,  being tested for Narcolepsey).  Medical allergies: no.  Other surgeries include:  None reported.  Current medications:  Other (allergy medication).  Current occupation is [employer].    Primary job tasks include:  Lifting and prolonged sitting.    Barriers include:  Transportation.    Red flags:  None as reported by the patient.                        Objective:  Standing Alignment:    Cervical/Thoracic:  Forward head  Shoulder/UE:  Rounded shoulders  Lumbar:  Lordosis decr                                Cervical/Thoracic Evaluation    AROM:  AROM  Cervical:    Flexion:          Chin to chest  Extension:       Min loss; pain lower neck  Rotation:         Left: min loss     Right: min loss  Side Bend:      Left:     Right:                                Shoulder Evaluation:  ROM:  AROM:    Flexion:  Left:  162    Right:  158 ; pain  Extension: Left: 78Right: 62; pain  Abduction:  Left: 162 (pain at 142)   Right:  122 (pain)                  Extension/Internal Rotation:  Left:  T8    Right:  T10          Strength:    Flexion: Left:4+/5   Pain:    Right: 4-/5      Pain:  -  Extension:  Left: 5/5    Pain:    Right: 5/5    Pain:  Abduction:  Left: 4+/5  Pain:    Right: 4-/5      Pain:-/+  Adduction:  Left: 5/5    Pain:    Right: 5/5     Pain:  Internal Rotation:  Left:5/5     Pain:    Right: 5/5     Pain:  External Rotation:   Left:4+/5     Pain:   Right:4/5     Pain:        Elbow Flexion:  Left:5/5     Pain:    Right:5/5     Pain:  Elbow Extension:  Left:4+/5     Pain:    Right:4+/5     Pain:    Special Tests:      Right shoulder positive for the following special tests:Impingement         ROM:          :  Dominance: Right   Left: 90#      Right: 85#                                      General     ROS    Assessment/Plan:    Patient is a 57 year old male with both sides shoulder complaints.    Patient has the following significant findings with corresponding treatment plan.                Diagnosis 1:  Bilateral Shoulder pain  Pain -  hot/cold therapy, US and manual therapy  Decreased ROM/flexibility - manual therapy and therapeutic exercise  Decreased strength - therapeutic exercise and therapeutic activities  Impaired muscle performance - neuro re-education  Decreased function - therapeutic activities  Impaired posture - neuro re-education    Therapy Evaluation Codes:   1) History comprised of:   Personal factors that impact the plan of care:      Past/current experiences.    Comorbidity factors that impact the plan of care are:      Down Syndrome.      Medications impacting care: Pain.  2) Examination of Body Systems comprised of:   Body structures and functions that impact the plan of care:      Shoulder.   Activity limitations that impact the plan of care are:      Dressing, Lifting and reaching.  3) Clinical presentation characteristics are:   Stable/Uncomplicated.  4) Decision-Making    Low complexity using standardized patient assessment instrument and/or measureable assessment of functional outcome.  Cumulative Therapy Evaluation is: Low complexity.    Previous and current functional limitations:  (See Goal Flow Sheet for this information)    Short term and Long term goals: (See Goal Flow Sheet for this information)     Communication ability:  Patient appears to be able to clearly communicate and understand verbal and written communication and follow directions correctly.  Treatment Explanation - The following has been discussed with the patient:   RX ordered/plan of care  Anticipated outcomes  Possible risks and side effects  This patient would benefit from PT intervention to resume normal activities.   Rehab potential is good.    Frequency:  1 X week, once daily  Duration:  for 6 weeks  Discharge Plan:  Achieve all LTG.  Independent in home treatment program.  Reach maximal therapeutic benefit.    Please refer to the daily flowsheet for treatment today, total treatment time and time spent performing 1:1 timed codes.

## 2018-06-20 NOTE — LETTER
[facility]  [address]  [facility] [address]  [phone]    2018    Re: Kody Benton   :   1960  MRN:  [id number]   REFERRING PHYSICIAN:   Hugo Hernandez    [facility]    Date of Initial Evaluation:  2018  Visits:  Rxs Used: 1  Reason for Referral:  Bilateral shoulder pain, unspecified chronicity    EVALUATION SUMMARY    [facility] Initial Evaluation    Subjective:  Patient is a 57 year old male presenting with rehab left shoulder hpi. The history is provided by the patient. No  was used.   Kody Benton is a 57 year old male with a bilateral shoulders (R>L generally) condition.      This is a recurrent and chronic condition  Patient presents to PT today with c/o Bilateral shoulder pain;  R>L pain.  Patient stated the L shoulder pain started over 4 years;  R shoulder pain this past winter  Denies having previous neck or R shoulder pain.    Patient referred: 18.  .    Patient reports pain:  Lateral and upper arm (R shooulder;  deep in socket on L side).    Pain is described as aching and burning and is intermittent and reported as 6/10 (with activity).  Associated symptoms:  Painful arc. Pain is worse during the day.  Symptoms are exacerbated by lifting, certain positions, lying on extremity and using arm overhead and relieved by nothing.  Since onset symptoms are unchanged.  Special tests:  X-ray (done by primary PT).  Previous treatment includes other (injection).  There was mild improvement following previous treatment.  General health as reported by patient is fair.  Pertinent medical history includes:  Mental illness, overweight, sleep disorder/apnea and other (Mosaic Down Syndrome,  Klienfelters syndrome, ADHD,  being tested for Narcolepsey).  Medical allergies: no.  Other surgeries include:  None reported.  Current medications:  Other (allergy  medication).  Current occupation is [employer].    Primary job tasks include:  Lifting and prolonged sitting.  Barriers include:  Transportation.  Red flags:  None as reported by the patient.                 Objective:  Standing Alignment:    Cervical/Thoracic:  Forward head  Shoulder/UE:  Rounded shoulders  Lumbar:  Lordosis decr          Re: Kody Benton   :   1960       Cervical/Thoracic Evaluation  AROM:  AROM Cervical:  Flexion:          Chin to chest  Extension:       Min loss; pain lower neck  Rotation:         Left: min loss     Right: min loss  Side Bend:      Left:     Right:   Shoulder Evaluation:  ROM:  AROM:    Flexion:  Left:  162    Right:  158 ; pain  Extension: Left: 78Right: 62; pain  Abduction:  Left: 162 (pain at 142)   Right:  122 (pain)  Extension/Internal Rotation:  Left:  T8    Right:  T10    Strength:    Flexion: Left:4+/5   Pain:    Right: 4-/5      Pain:  -  Extension:  Left: 5/5    Pain:    Right: 5/5    Pain:  Abduction:  Left: 4+/5  Pain:    Right: 4-/5      Pain:-/+  Adduction:  Left: 5/5    Pain:    Right: 5/5     Pain:  Internal Rotation:  Left:5/5     Pain:    Right: 5/5     Pain:  External Rotation:   Left:4+/5     Pain:   Right:4/5     Pain:    Elbow Flexion:  Left:5/5     Pain:    Right:5/5     Pain:  Elbow Extension:  Left:4+/5     Pain:    Right:4+/5     Pain:  Special Tests:    Right shoulder positive for the following special tests:Impingement  ROM:  :  Dominance: Right   Left: 90#      Right: 85#  Assessment/Plan:    Patient is a 57 year old male with both sides shoulder complaints.    Patient has the following significant findings with corresponding treatment plan.                Diagnosis 1:  Bilateral Shoulder pain  Pain -  hot/cold therapy, US and manual therapy  Decreased ROM/flexibility - manual therapy and therapeutic exercise  Decreased strength - therapeutic exercise and therapeutic activities  Impaired muscle  performance - neuro re-education  Decreased function - therapeutic activities  Impaired posture - neuro re-education  Therapy Evaluation Codes:   1) History comprised of:   Personal factors that impact the plan of care:      Past/current experiences.    Comorbidity factors that impact the plan of care are:      Down Syndrome.     Medications impacting care: Pain.  2) Examination of Body Systems comprised of:   Body structures and functions that impact the plan of care:      Shoulder.   Activity limitations that impact the plan of care are:      Dressing, Lifting and reaching.  Re: Kody Benton   :   1960    3) Clinical presentation characteristics are:   Stable/Uncomplicated.  4) Decision-Making    Low complexity using standardized patient assessment instrument and/or measureable assessment of functional outcome.  Cumulative Therapy Evaluation is: Low complexity.    Previous and current functional limitations:  (See Goal Flow Sheet for this information)    Short term and Long term goals: (See Goal Flow Sheet for this information)   Communication ability:  Patient appears to be able to clearly communicate and understand verbal and written communication and follow directions correctly.  Treatment Explanation - The following has been discussed with the patient:   RX ordered/plan of care  Anticipated outcomes  Possible risks and side effects  This patient would benefit from PT intervention to resume normal activities.   Rehab potential is good.  Frequency:  1 X week, once daily  Duration:  for 6 weeks  Discharge Plan:  Achieve all LTG.  Independent in home treatment program.  Reach maximal therapeutic benefit.          Thank you for your referral.    INQUIRIES  Therapist: Lupe Mccartney, [facility]  [facility]  [address]  [facility] [address]  Phone: [phone]  Fax: [phone]

## 2018-06-20 NOTE — MR AVS SNAPSHOT
After Visit Summary   6/20/2018    Kody Benton    MRN: [id number]           Patient Information     Date Of Birth          1960        Visit Information        Provider Department      6/20/2018 3:00 PM Lupe Mccartney, PT [facility]        Today's Diagnoses     Bilateral shoulder pain, unspecified chronicity    -  1       Follow-ups after your visit        Your next 10 appointments already scheduled     Jun 27, 2018  9:40 AM CDT   New Sleep Patient with Hans Fowler MD   [facility] ([facility])    [address] [address] [address]   [phone]            Jun 27, 2018  3:00 PM CDT   [facility] Spine with Lupe Mccartney PT   [facility] ([facility] [place]  )    [address]  [place] [address]   [phone]            Jul 02, 2018  3:00 PM CDT   IL[facility] Extremity with Lupe Mccartney PT   [facility] ([facility] [place]  )    [address]  [place] [address]   [phone]              Who to contact     If you have questions or need follow up information about today's clinic visit or your schedule please contact [facility] directly at [phone].  Normal or non-critical lab and imaging results will be communicated to you by MyChart, letter or phone within 4 business days after the clinic has received the results. If you do not hear from us within 7 days, please contact the clinic through [employer] or phone. If you have a critical or abnormal lab result, we will notify you by phone as soon as possible.  Submit refill requests through [employer] or call your pharmacy and they will forward the refill request to us. Please allow 3 business days for your refill to be completed.          Additional Information About Your Visit        MyChart Information     [employer] gives you  secure access to your electronic health record. If you see a primary care provider, you can also send messages to your care team and make appointments. If you have questions, please call your primary care clinic.  If you do not have a primary care provider, please call [phone] and they will assist you.        Care EveryWhere ID     This is your Care EveryWhere ID. This could be used by other organizations to access your [facility] medical records  [id number]         Blood Pressure from Last 3 Encounters:   05/30/18 137/80   05/24/18 114/78   02/01/18 103/62    Weight from Last 3 Encounters:   05/30/18 81.9 kg (180 lb 9.6 oz)   05/24/18 81.6 kg (179 lb 12.8 oz)   02/01/18 82.1 kg (181 lb)              We Performed the Following     HC PT EVAL, LOW COMPLEXITY     ILIA INITIAL EVAL REPORT     THERAPEUTIC EXERCISES        Primary Care Provider Office Phone # Fax #    Huang Keane [phone]       [address]  [facility] [address]        Equal Access to Services     [facility]: Hadii aad ku hadasho Soomaali, waaxda luqadaha, qaybta kaalmada adeegyada, waxay coco wiley . So [facility] [phone].    ATENCIÓN: Si habla español, tiene a valiente disposición servicios gratchandrakantos de asistencia lingüística. Cassy al [phone].    We comply with applicable federal civil rights laws and [place] laws. We do not discriminate on the basis of race, color, national origin, age, disability, sex, sexual orientation, or gender identity.            Thank you!     Thank you for choosing [facility]  for your care. Our goal is always to provide you with excellent care. Hearing back from our patients is one way we can continue to improve our services. Please take a few minutes to complete the written survey that you may receive in the mail after your visit with us. Thank you!             Your Updated Medication List - Protect others around you: Learn  how to safely use, store and throw away your medicines at www.disposemymeds.org.          This list is accurate as of 6/20/18  5:22 PM.  Always use your most recent med list.                   Brand Name Dispense Instructions for use Diagnosis    albuterol 108 (90 Base) MCG/ACT Inhaler    PROAIR HFA/PROVENTIL HFA/VENTOLIN HFA    2 Inhaler    Inhale 2 puffs into the lungs every 4 hours as needed    Chest tightness       azelastine 0.1 % spray    ASTELIN    1 Bottle    Spray 1 spray into both nostrils 2 times daily    Allergic rhinitis due to cat hair       fexofenadine 180 MG tablet    ALLEGRA    90 tablet    Take 1 tablet (180 mg) by mouth daily    Allergic rhinitis due to cat hair, Allergic rhinitis due to mold, Allergic rhinitis due to dust mite       fluticasone 50 MCG/ACT spray    FLONASE    16 mL    USE TWO SPRAYS IN EACH NOSTRIL DAILY    Chronic rhinitis       montelukast 10 MG tablet    SINGULAIR    30 tablet    Take 1 tablet (10 mg) by mouth At Bedtime    Seasonal allergic rhinitis due to other allergic trigger       triamcinolone 0.1 % cream    KENALOG    453.6 g    Apply sparingly to affected area three times daily as needed    Venous stasis dermatitis of right lower extremity

## 2018-06-25 PROBLEM — E66.09 CLASS 1 OBESITY DUE TO EXCESS CALORIES WITHOUT SERIOUS COMORBIDITY WITH BODY MASS INDEX (BMI) OF 32.0 TO 32.9 IN ADULT: Chronic | Status: ACTIVE | Noted: 2018-06-25

## 2018-06-25 PROBLEM — R07.89 CHEST TIGHTNESS: Status: ACTIVE | Noted: 2017-08-07

## 2018-06-25 PROBLEM — J30.89 ALLERGIC RHINITIS DUE TO DUST MITE: Chronic | Status: ACTIVE | Noted: 2017-08-07

## 2018-06-25 PROBLEM — J30.81 ALLERGIC RHINITIS DUE TO CAT HAIR: Status: ACTIVE | Noted: 2017-08-07

## 2018-06-25 PROBLEM — J30.89 ALLERGIC RHINITIS DUE TO MOLD: Chronic | Status: ACTIVE | Noted: 2017-08-07

## 2018-06-25 PROBLEM — J30.81 ALLERGIC RHINITIS DUE TO CAT HAIR: Chronic | Status: ACTIVE | Noted: 2017-08-07

## 2018-06-25 PROBLEM — E66.811 CLASS 1 OBESITY DUE TO EXCESS CALORIES WITHOUT SERIOUS COMORBIDITY WITH BODY MASS INDEX (BMI) OF 32.0 TO 32.9 IN ADULT: Chronic | Status: ACTIVE | Noted: 2018-06-25

## 2018-06-25 PROBLEM — J30.89 ALLERGIC RHINITIS DUE TO MOLD: Status: ACTIVE | Noted: 2017-08-07

## 2018-06-25 PROBLEM — J30.89 ALLERGIC RHINITIS DUE TO DUST MITE: Status: ACTIVE | Noted: 2017-08-07

## 2018-06-27 ENCOUNTER — THERAPY VISIT (OUTPATIENT)
Dept: PHYSICAL THERAPY | Facility: CLINIC | Age: 58
End: 2018-06-27
Payer: COMMERCIAL

## 2018-06-27 ENCOUNTER — OFFICE VISIT (OUTPATIENT)
Dept: SLEEP MEDICINE | Facility: CLINIC | Age: 58
End: 2018-06-27
Attending: INTERNAL MEDICINE
Payer: COMMERCIAL

## 2018-06-27 VITALS
OXYGEN SATURATION: 98 % | SYSTOLIC BLOOD PRESSURE: 106 MMHG | WEIGHT: 175 LBS | DIASTOLIC BLOOD PRESSURE: 73 MMHG | BODY MASS INDEX: 31.01 KG/M2 | HEIGHT: 63 IN | HEART RATE: 72 BPM

## 2018-06-27 DIAGNOSIS — G47.10 ORGANIC HYPERSOMNIA: Primary | ICD-10-CM

## 2018-06-27 DIAGNOSIS — M25.512 BILATERAL SHOULDER PAIN, UNSPECIFIED CHRONICITY: ICD-10-CM

## 2018-06-27 DIAGNOSIS — E66.09 CLASS 1 OBESITY DUE TO EXCESS CALORIES WITHOUT SERIOUS COMORBIDITY WITH BODY MASS INDEX (BMI) OF 32.0 TO 32.9 IN ADULT: Chronic | ICD-10-CM

## 2018-06-27 DIAGNOSIS — Q90.9 DOWN'S SYNDROME: ICD-10-CM

## 2018-06-27 DIAGNOSIS — M25.511 BILATERAL SHOULDER PAIN, UNSPECIFIED CHRONICITY: ICD-10-CM

## 2018-06-27 DIAGNOSIS — E66.811 CLASS 1 OBESITY DUE TO EXCESS CALORIES WITHOUT SERIOUS COMORBIDITY WITH BODY MASS INDEX (BMI) OF 32.0 TO 32.9 IN ADULT: Chronic | ICD-10-CM

## 2018-06-27 PROCEDURE — 99244 OFF/OP CNSLTJ NEW/EST MOD 40: CPT | Performed by: INTERNAL MEDICINE

## 2018-06-27 PROCEDURE — 97112 NEUROMUSCULAR REEDUCATION: CPT | Mod: GP | Performed by: PHYSICAL THERAPIST

## 2018-06-27 PROCEDURE — 97110 THERAPEUTIC EXERCISES: CPT | Mod: GP | Performed by: PHYSICAL THERAPIST

## 2018-06-27 NOTE — PROGRESS NOTES
Sleep Consultation:    Date on this visit: 6/27/2018    Kody Benton is sent by Huang Keane for a sleep consultation regarding narcolepsy.    Primary Physician: Huang Keane     Chief Complaint   Patient presents with     Sleep Problem     poss narcolepsy       Patient is here with his brother-in-law Colin and lives with him    He has been fired from several jobs due to sleepiness. He saw a state-appointed  'psychologist or psychiatrist' in 2010 who diagnosed him with 'mosaic Down's syndrome', Kleinfelter's syndrome, ADHD and possible narcolepsy.     Kody goes to bed at 1-2 AM during the week. He gets up at 7-8 AM without an alarm (dogs). He falls asleep in 5 minutes.  Kody denies difficulty falling asleep.  He wakes up 1-2 times a night for 5 minutes before falling back to sleep.  Kody wakes up to take dogs out. On weekends, schedule is similar.   Patient gets an average of 6 hours of sleep per night.     Patient does not use electronics in bed and watch TV in bed.     Kody does currently work.  He is on disability    Kody does snore infrequently. Patient does not have a regular bed partner. There is not report of gasping, choking and snorting.  He does not have witnessed apneas. Patient sleeps on his back and side. He denies no morning headaches and restless legs.     Kody denies any sleep walking, sleep talking, dream enactment, sleep paralysis, cataplexy and hypnogogic/hypnopompic hallucinations.    Kody denies reflux at night.      Patient describes themself as a morning person. Patient's [place] Sleepiness score 22/24 consistent with severe daytime sleepiness.  He has been sleepy his whole life.     Kody naps 1 times per day for 20-30 minutes, feels refreshed after naps. He takes frequent inadvertant naps (5-6 times a day).  He does not drive. He uses 2 cups/day of coffee     Allergies:    No Known Allergies    Medications:    Current Outpatient Prescriptions    Medication Sig Dispense Refill     albuterol (PROAIR HFA/PROVENTIL HFA/VENTOLIN HFA) 108 (90 BASE) MCG/ACT Inhaler Inhale 2 puffs into the lungs every 4 hours as needed 2 Inhaler 3     azelastine (ASTELIN) 0.1 % spray Spray 1 spray into both nostrils 2 times daily 1 Bottle 11     fexofenadine (ALLEGRA) 180 MG tablet Take 1 tablet (180 mg) by mouth daily 90 tablet 3     fluticasone (FLONASE) 50 MCG/ACT spray USE TWO SPRAYS IN EACH NOSTRIL DAILY 16 mL 4     montelukast (SINGULAIR) 10 MG tablet Take 1 tablet (10 mg) by mouth At Bedtime 30 tablet 11     triamcinolone (KENALOG) 0.1 % cream Apply sparingly to affected area three times daily as needed 453.6 g 5       Problem List:  Patient Active Problem List    Diagnosis Date Noted     Down's syndrome 10/08/2015     Priority: Medium     'mosaic'       Class 1 obesity due to excess calories without serious comorbidity with body mass index (BMI) of 32.0 to 32.9 in adult 06/25/2018     Priority: Low     Bilateral shoulder pain, unspecified chronicity 06/20/2018     Priority: Low     Chest tightness 08/07/2017     Priority: Low     Allergic rhinitis due to cat hair 08/07/2017     Priority: Low     Allergic rhinitis due to mold 08/07/2017     Priority: Low     Allergic rhinitis due to dust mite 08/07/2017     Priority: Low     Esophageal web determined by endoscopy 08/27/2016     Priority: Low     Gastritis, Helicobacter pylori 08/27/2016     Priority: Low     Advanced directives, counseling/discussion 10/08/2015     Priority: Low     Discussed advance care planning with patient; information given to patient to review. October 8, 2015  Sai Claros MA           Unilateral inguinal hernia 10/08/2015     Priority: Low        Past Medical/Surgical History:  No past medical history on file.  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     Social History Main Topics     Smoking status: Never Smoker     Smokeless tobacco: Never Used     Alcohol use No      Comment: socially     Drug use: No     Sexual activity: No     Other Topics Concern     Not on file     Social History Narrative       Family History:  Family History   Problem Relation Age of Onset     Diabetes Sister      Breast Cancer Sister      Coronary Artery Disease No family hx of      Colon Cancer No family hx of        Review of Systems:  A complete review of systems reviewed by me is negative with the exeption of what has been mentioned in the history of present illness.  CONSTITUTIONAL:  NEGATIVE for  night sweats  EYES: NEGATIVE for changes in vision, blind spots, double vision.  ENT:  POSITIVE for  post-nasal drip and runny nose  CARDIAC:  POSITIVE for  swollen feet  NEUROLOGIC:  POSITIVE for  weakness or numbness in the arms or legs  DERMATOLOGIC: NEGATIVE for rashes, new moles or change in mole(s)  PULMONARY:  POSITIVE for  SOB at rest, dry cough and productive cough  GASTROINTESTINAL: NEGATIVE for nausea or vomitting, loose or watery stools, fat or grease in stools, constipation, abdominal pain, bowel movements black in color or blood noted.  GENITOURINARY: NEGATIVE for pain during urination, blood in urine, urinating more frequently than usual, irregular menstrual periods.  MUSCULOSKELETAL:  POSITIVE for  muscle pain and bone or joint pain  ENDOCRINE: NEGATIVE for increased thirst or urination, diabetes.  LYMPHATIC: NEGATIVE for swollen lymph nodes, lumps or bumps in the breasts or nipple discharge.    Physical Examination:  Vitals: /73  Pulse 72  Ht 1.588 m (5' 2.5\")  Wt 79.4 kg (175 lb)  SpO2 98%  BMI 31.5 kg/m2  BMI= Body mass index is 31.5 kg/(m^2).    [place] Total Score 6/27/2018   Total score - [place] 22 "     GENERAL APPEARANCE: alert and no distress  EYES: left lid leg, ? Left eye weakness superior abduction   HENT: nose and mouth without ulcers or lesions, TM's injected  NECK: no adenopathy, no asymmetry, masses, or scars and thyroid normal to palpation,  septal deviation to left   RESP: lungs clear to auscultation - no rales, rhonchi or wheezes  CV: regular rates and rhythm, normal S1 S2, no S3 or S4 and no murmur, click or rub  ABDOMEN: soft, nontender, without hepatosplenomegaly or masses  MS: extremities normal- no gross deformities noted  SKIN: no suspicious lesions or rashes  NEURO: Normal strength and tone, mentation intact, speech normal and cranial nerves 2-12 intact  PSYCH: mentation appears normal and affect normal/bright  Mallampati Class: II.  Tonsillar Stage: 2  visible at pillars.    Impression/Plan:    Excessive daytime sleepiness, history of difficult to control appetite   Differential diagnosis:  1) inadequate total sleep time  2) sleep disruption/obstructive sleep apnea   3) central hypersomnia: narcolepsy, idiopathic hypersomnolence    Options reviewed.  Recommend Polysomnogram (using 4% desaturation/Medicare/2012 AASM 1B scoring rules) to rule out obstructive sleep apnea.      If negative would recommend trial of sleep extension with goal 9-10 hours for 12 weeks    If ineffective would consider hypersomnia workup with actigraphy x2 weeks, Polysomnogram, MSLT fto follow if AHI<10 and urine drug screen, though they are not anxious too use stimulants.       Hans Fowler     CC: Huang Johnson Vocal

## 2018-06-27 NOTE — PATIENT INSTRUCTIONS

## 2018-06-27 NOTE — NURSING NOTE
"Chief Complaint   Patient presents with     Sleep Problem     poss narcolepsy       Initial /73  Pulse 72  Ht 1.588 m (5' 2.5\")  Wt 79.4 kg (175 lb)  SpO2 98%  BMI 31.5 kg/m2 Estimated body mass index is 31.5 kg/(m^2) as calculated from the following:    Height as of this encounter: 1.588 m (5' 2.5\").    Weight as of this encounter: 79.4 kg (175 lb).    Medication Reconciliation: complete    Neck circumference: 16 inches / 41 centimeters.        "

## 2018-06-27 NOTE — MR AVS SNAPSHOT
After Visit Summary   6/27/2018    Kody Benton    MRN: [id number]           Patient Information     Date Of Birth          1960        Visit Information        Provider Department      6/27/2018 9:40 AM Hans Fowler MD [facility]        Today's Diagnoses     Organic hypersomnia    -  1    Down's syndrome        Class 1 obesity due to excess calories without serious comorbidity with body mass index (BMI) of 32.0 to 32.9 in adult          Care Instructions      Your BMI is Body mass index is 31.5 kg/(m^2).  Weight management is a personal decision.  If you are interested in exploring weight loss strategies, the following discussion covers the approaches that may be successful. Body mass index (BMI) is one way to tell whether you are at a healthy weight, overweight, or obese. It measures your weight in relation to your height.  A BMI of 18.5 to 24.9 is in the healthy range. A person with a BMI of 25 to 29.9 is considered overweight, and someone with a BMI of 30 or greater is considered obese. More than two-thirds of American adults are considered overweight or obese.  Being overweight or obese increases the risk for further weight gain. Excess weight may lead to heart disease and diabetes.  Creating and following plans for healthy eating and physical activity may help you improve your health.  Weight control is part of healthy lifestyle and includes exercise, emotional health, and healthy eating habits. Careful eating habits lifelong are the mainstay of weight control. Though there are significant health benefits from weight loss, long-term weight loss with diet alone may be very difficult to achieve- studies show long-term success with dietary management in less than 10% of people. Attaining a healthy weight may be especially difficult to achieve in those with severe obesity. In some cases, medications, devices and surgical management might be considered.  What can you do?  If  you are overweight or obese and are interested in methods for weight loss, you should discuss this with your provider.     Consider reducing daily calorie intake by 500 calories.     Keep a food journal.     Avoiding skipping meals, consider cutting portions instead.    Diet combined with exercise helps maintain muscle while optimizing fat loss. Strength training is particularly important for building and maintaining muscle mass. Exercise helps reduce stress, increase energy, and improves fitness. Increasing exercise without diet control, however, may not burn enough calories to loose weight.       Start walking three days a week 10-20 minutes at a time    Work towards walking thirty minutes five days a week     Eventually, increase the speed of your walking for 1-2 minutes at time    In addition, we recommend that you review healthy lifestyles and methods for weight loss available through the National Institutes of Health patient information sites:  http://win.niddk.nih.gov/publications/index.htm    And look into health and wellness programs that may be available through your health insurance provider, employer, local community center, or aishwarya club.    Weight management plan: Patient was referred to their PCP to discuss a diet and exercise plan.              Follow-ups after your visit        Follow-up notes from your care team     Return in about 2 weeks (around 7/11/2018) for Routine Visit.      Your next 10 appointments already scheduled     Jun 27, 2018  3:00 PM CDT   ILIA Spine with Lupe Mccartney, PT   [facility] ([facility]  )    [address]  [facility] [address]   [phone]            Jul 02, 2018  3:00 PM CDT   ILIA Extremity with Lupe Mccartney PT   [facility] ([facility]  )    [address]  [facility] [address]   [phone]            Aug 15, 2018  8:00 PM CDT   PSG Split with BK BED 3    [facility] ([facility])    [address]  [facility] [address] [phone]            Aug 29, 2018  1:40 PM CDT   Return Sleep Patient with Hans Fowler MD   [facility] ([facility])    [address]  [facility] [address] [phone]              Future tests that were ordered for you today     Open Future Orders        Priority Expected Expires Ordered    Comprehensive Sleep Study Routine  12/24/2018 6/27/2018            Who to contact     If you have questions or need follow up information about today's clinic visit or your schedule please contact [facility] directly at [phone].  Normal or non-critical lab and imaging results will be communicated to you by Sportiliahart, letter or phone within 4 business days after the clinic has received the results. If you do not hear from us within 7 days, please contact the clinic through 'Rock[employer] Your Papert or phone. If you have a critical or abnormal lab result, we will notify you by phone as soon as possible.  Submit refill requests through Donay or call your pharmacy and they will forward the refill request to us. Please allow 3 business days for your refill to be completed.          Additional Information About Your Visit        Donay Information     Donay gives you secure access to your electronic health record. If you see a primary care provider, you can also send messages to your care team and make appointments. If you have questions, please call your primary care clinic.  If you do not have a primary care provider, please call [phone] and they will assist you.        Care EveryWhere ID     This is your Care EveryWhere ID. This could be used by other organizations to access your [facility] medical records  [id number]        Your Vitals Were     Pulse Height Pulse Oximetry BMI (Body Mass Index)          72 1.588  "m (5' 2.5\") 98% 31.5 kg/m2         Blood Pressure from Last 3 Encounters:   06/27/18 106/73   05/30/18 137/80   05/24/18 114/78    Weight from Last 3 Encounters:   06/27/18 79.4 kg (175 lb)   05/30/18 81.9 kg (180 lb 9.6 oz)   05/24/18 81.6 kg (179 lb 12.8 oz)              We Performed the Following     SLEEP EVALUATION & MANAGEMENT REFERRAL - [facility]  [phone] (Age 15 and up)        Primary Care Provider Office Phone # Fax #    Huang Keane [phone] [address]  [facility] [address]        Equal Access to Services     BLAIR WILLIAMSON : Hadii benny darby hadasho Soomaali, waaxda luqadaha, qaybta kaalmada adeegyada, robert wiley . So [facility] [phone].    ATENCIÓN: Si habla español, tiene a valiente disposición servicios gratuitos de asistencia lingüística. Llame al [phone].    We comply with applicable federal civil rights laws and [place] laws. We do not discriminate on the basis of race, color, national origin, age, disability, sex, sexual orientation, or gender identity.            Thank you!     Thank you for choosing [facility] [place] [facility]  for your care. Our goal is always to provide you with excellent care. Hearing back from our patients is one way we can continue to improve our services. Please take a few minutes to complete the written survey that you may receive in the mail after your visit with us. Thank you!             Your Updated Medication List - Protect others around you: Learn how to safely use, store and throw away your medicines at www.disposemymeds.org.          This list is accurate as of 6/27/18 10:49 AM.  Always use your most recent med list.                   Brand Name Dispense Instructions for use Diagnosis    albuterol 108 (90 Base) MCG/ACT Inhaler    PROAIR HFA/PROVENTIL HFA/VENTOLIN HFA    2 Inhaler    Inhale 2 puffs into the lungs every 4 hours as needed    Chest tightness       " azelastine 0.1 % spray    ASTELIN    1 Bottle    Spray 1 spray into both nostrils 2 times daily    Allergic rhinitis due to cat hair       fexofenadine 180 MG tablet    ALLEGRA    90 tablet    Take 1 tablet (180 mg) by mouth daily    Allergic rhinitis due to cat hair, Allergic rhinitis due to mold, Allergic rhinitis due to dust mite       fluticasone 50 MCG/ACT spray    FLONASE    16 mL    USE TWO SPRAYS IN EACH NOSTRIL DAILY    Chronic rhinitis       montelukast 10 MG tablet    SINGULAIR    30 tablet    Take 1 tablet (10 mg) by mouth At Bedtime    Seasonal allergic rhinitis due to other allergic trigger       triamcinolone 0.1 % cream    KENALOG    453.6 g    Apply sparingly to affected area three times daily as needed    Venous stasis dermatitis of right lower extremity

## 2018-07-02 ENCOUNTER — THERAPY VISIT (OUTPATIENT)
Dept: PHYSICAL THERAPY | Facility: CLINIC | Age: 58
End: 2018-07-02
Payer: COMMERCIAL

## 2018-07-02 DIAGNOSIS — M25.512 BILATERAL SHOULDER PAIN, UNSPECIFIED CHRONICITY: ICD-10-CM

## 2018-07-02 DIAGNOSIS — M25.511 BILATERAL SHOULDER PAIN, UNSPECIFIED CHRONICITY: ICD-10-CM

## 2018-07-02 PROCEDURE — 97110 THERAPEUTIC EXERCISES: CPT | Mod: GP | Performed by: PHYSICAL THERAPIST

## 2018-07-02 PROCEDURE — 97112 NEUROMUSCULAR REEDUCATION: CPT | Mod: GP | Performed by: PHYSICAL THERAPIST

## 2018-07-06 ENCOUNTER — TRANSFERRED RECORDS (OUTPATIENT)
Dept: HEALTH INFORMATION MANAGEMENT | Facility: CLINIC | Age: 58
End: 2018-07-06

## 2018-07-11 ENCOUNTER — THERAPY VISIT (OUTPATIENT)
Dept: PHYSICAL THERAPY | Facility: CLINIC | Age: 58
End: 2018-07-11
Payer: COMMERCIAL

## 2018-07-11 DIAGNOSIS — M25.511 BILATERAL SHOULDER PAIN, UNSPECIFIED CHRONICITY: ICD-10-CM

## 2018-07-11 DIAGNOSIS — M25.512 BILATERAL SHOULDER PAIN, UNSPECIFIED CHRONICITY: ICD-10-CM

## 2018-07-11 PROCEDURE — 97110 THERAPEUTIC EXERCISES: CPT | Mod: GP | Performed by: PHYSICAL THERAPIST

## 2018-07-11 PROCEDURE — 97112 NEUROMUSCULAR REEDUCATION: CPT | Mod: GP | Performed by: PHYSICAL THERAPIST

## 2018-07-18 ENCOUNTER — THERAPY VISIT (OUTPATIENT)
Dept: PHYSICAL THERAPY | Facility: CLINIC | Age: 58
End: 2018-07-18
Payer: COMMERCIAL

## 2018-07-18 DIAGNOSIS — M25.511 BILATERAL SHOULDER PAIN, UNSPECIFIED CHRONICITY: ICD-10-CM

## 2018-07-18 DIAGNOSIS — M25.512 BILATERAL SHOULDER PAIN, UNSPECIFIED CHRONICITY: ICD-10-CM

## 2018-07-18 PROCEDURE — 97140 MANUAL THERAPY 1/> REGIONS: CPT | Mod: GP | Performed by: PHYSICAL THERAPIST

## 2018-07-18 PROCEDURE — 97112 NEUROMUSCULAR REEDUCATION: CPT | Mod: GP | Performed by: PHYSICAL THERAPIST

## 2018-07-18 PROCEDURE — 97110 THERAPEUTIC EXERCISES: CPT | Mod: GP | Performed by: PHYSICAL THERAPIST

## 2018-07-26 ENCOUNTER — THERAPY VISIT (OUTPATIENT)
Dept: PHYSICAL THERAPY | Facility: CLINIC | Age: 58
End: 2018-07-26
Payer: COMMERCIAL

## 2018-07-26 DIAGNOSIS — M25.511 BILATERAL SHOULDER PAIN, UNSPECIFIED CHRONICITY: ICD-10-CM

## 2018-07-26 DIAGNOSIS — M25.512 BILATERAL SHOULDER PAIN, UNSPECIFIED CHRONICITY: ICD-10-CM

## 2018-07-26 PROCEDURE — 97110 THERAPEUTIC EXERCISES: CPT | Mod: GP | Performed by: PHYSICAL THERAPIST

## 2018-07-26 PROCEDURE — 97140 MANUAL THERAPY 1/> REGIONS: CPT | Mod: GP | Performed by: PHYSICAL THERAPIST

## 2018-08-03 ENCOUNTER — THERAPY VISIT (OUTPATIENT)
Dept: PHYSICAL THERAPY | Facility: CLINIC | Age: 58
End: 2018-08-03
Payer: COMMERCIAL

## 2018-08-03 DIAGNOSIS — M25.511 BILATERAL SHOULDER PAIN, UNSPECIFIED CHRONICITY: ICD-10-CM

## 2018-08-03 DIAGNOSIS — M25.512 BILATERAL SHOULDER PAIN, UNSPECIFIED CHRONICITY: ICD-10-CM

## 2018-08-03 PROCEDURE — 97140 MANUAL THERAPY 1/> REGIONS: CPT | Mod: GP | Performed by: PHYSICAL THERAPIST

## 2018-08-03 PROCEDURE — 97110 THERAPEUTIC EXERCISES: CPT | Mod: GP | Performed by: PHYSICAL THERAPIST

## 2018-08-03 PROCEDURE — 97112 NEUROMUSCULAR REEDUCATION: CPT | Mod: GP | Performed by: PHYSICAL THERAPIST

## 2018-08-03 NOTE — MR AVS SNAPSHOT
After Visit Summary   8/3/2018    Kody Benton    MRN: [id number]           Patient Information     Date Of Birth          1960        Visit Information        Provider Department      8/3/2018 2:50 PM Lupe Mccartney, PT [facility]        Today's Diagnoses     Bilateral shoulder pain, unspecified chronicity           Follow-ups after your visit        Your next 10 appointments already scheduled     Aug 15, 2018  8:00 PM CDT   PSG Split with BK BED 3   [facility] ([facility])    [address]  [facility] [address]   [phone]            Aug 17, 2018  1:30 PM CDT   ILIA Extremity with Lupe Mccartney PT   [facility] (ILIA [place]  )    [address]  [facility] [address]   [phone]            Aug 29, 2018  1:40 PM CDT   Return Sleep Patient with Hans Fowler MD   [facility] ([facility])    [address]  [facility] [address]   [phone]              Who to contact     If you have questions or need follow up information about today's clinic visit or your schedule please contact [facility] directly at [phone].  Normal or non-critical lab and imaging results will be communicated to you by MyChart, letter or phone within 4 business days after the clinic has received the results. If you do not hear from us within 7 days, please contact the clinic through Implisithart or phone. If you have a critical or abnormal lab result, we will notify you by phone as soon as possible.  Submit refill requests through Re[employer]ot[employer] or call your pharmacy and they will forward the refill request to us. Please allow 3 business days for your refill to be completed.          Additional Information About Your Visit        MyChart Information     Re[employer] gives you  secure access to your electronic health record. If you see a primary care provider, you can also send messages to your care team and make appointments. If you have questions, please call your primary care clinic.  If you do not have a primary care provider, please call [phone] and they will assist you.        Care EveryWhere ID     This is your Care EveryWhere ID. This could be used by other organizations to access your [place] medical records  [id number]         Blood Pressure from Last 3 Encounters:   06/27/18 106/73   05/30/18 137/80   05/24/18 114/78    Weight from Last 3 Encounters:   06/27/18 79.4 kg (175 lb)   05/30/18 81.9 kg (180 lb 9.6 oz)   05/24/18 81.6 kg (179 lb 12.8 oz)              We Performed the Following     MANUAL THER TECH,1+REGIONS,EA 15 MIN     NEUROMUSCULAR RE-EDUCATION     THERAPEUTIC EXERCISES        Primary Care Provider Office Phone # Fax #    Huang Keane [phone]       [address]  [facility] [address]        Equal Access to Services     [facility]: Hadii aad ku hadasho Soomaali, waaxda luqadaha, qaybta kaalmada adeegyada, robert wiley . So [facility] [phone].    ATENCIÓN: Si habla español, tiene a valiente disposición servicios gratchandrakantos de asistencia lingüística. Cassy al [phone].    We comply with applicable federal civil rights laws and [place] laws. We do not discriminate on the basis of race, color, national origin, age, disability, sex, sexual orientation, or gender identity.            Thank you!     Thank you for choosing [facility]  for your care. Our goal is always to provide you with excellent care. Hearing back from our patients is one way we can continue to improve our services. Please take a few minutes to complete the written survey that you may receive in the mail after your visit with us. Thank you!             Your Updated Medication List - Protect others  around you: Learn how to safely use, store and throw away your medicines at www.disposemymeds.org.          This list is accurate as of 8/3/18  3:56 PM.  Always use your most recent med list.                   Brand Name Dispense Instructions for use Diagnosis    albuterol 108 (90 Base) MCG/ACT Inhaler    PROAIR HFA/PROVENTIL HFA/VENTOLIN HFA    2 Inhaler    Inhale 2 puffs into the lungs every 4 hours as needed    Chest tightness       azelastine 0.1 % spray    ASTELIN    1 Bottle    Spray 1 spray into both nostrils 2 times daily    Allergic rhinitis due to cat hair       fexofenadine 180 MG tablet    ALLEGRA    90 tablet    Take 1 tablet (180 mg) by mouth daily    Allergic rhinitis due to cat hair, Allergic rhinitis due to mold, Allergic rhinitis due to dust mite       fluticasone 50 MCG/ACT spray    FLONASE    16 mL    USE TWO SPRAYS IN EACH NOSTRIL DAILY    Chronic rhinitis       montelukast 10 MG tablet    SINGULAIR    30 tablet    Take 1 tablet (10 mg) by mouth At Bedtime    Seasonal allergic rhinitis due to other allergic trigger       triamcinolone 0.1 % cream    KENALOG    453.6 g    Apply sparingly to affected area three times daily as needed    Venous stasis dermatitis of right lower extremity

## 2018-08-15 ENCOUNTER — THERAPY VISIT (OUTPATIENT)
Dept: SLEEP MEDICINE | Facility: CLINIC | Age: 58
End: 2018-08-15
Payer: COMMERCIAL

## 2018-08-15 DIAGNOSIS — E66.811 CLASS 1 OBESITY DUE TO EXCESS CALORIES WITHOUT SERIOUS COMORBIDITY WITH BODY MASS INDEX (BMI) OF 32.0 TO 32.9 IN ADULT: Chronic | ICD-10-CM

## 2018-08-15 DIAGNOSIS — G47.10 ORGANIC HYPERSOMNIA: ICD-10-CM

## 2018-08-15 DIAGNOSIS — E66.09 CLASS 1 OBESITY DUE TO EXCESS CALORIES WITHOUT SERIOUS COMORBIDITY WITH BODY MASS INDEX (BMI) OF 32.0 TO 32.9 IN ADULT: Chronic | ICD-10-CM

## 2018-08-15 DIAGNOSIS — Q90.9 DOWN'S SYNDROME: ICD-10-CM

## 2018-08-15 PROCEDURE — 95810 POLYSOM 6/> YRS 4/> PARAM: CPT | Performed by: INTERNAL MEDICINE

## 2018-08-15 NOTE — MR AVS SNAPSHOT
After Visit Summary   8/15/2018    Kody Benton    MRN: [id number]           Patient Information     Date Of Birth          1960        Visit Information        Provider Department      8/15/2018 8:00 PM BK BED 3 [place] [facility]        Today's Diagnoses     Down's syndrome        Class 1 obesity due to excess calories without serious comorbidity with body mass index (BMI) of 32.0 to 32.9 in adult        Organic hypersomnia           Follow-ups after your visit        Your next 10 appointments already scheduled     Aug 17, 2018  1:30 PM CDT   ILIA Extremity with Lupe Mccartney, BEBO   [facility] [place] ([facility] [place]  )    [address]  [place] [address] [phone]            Aug 29, 2018  1:40 PM CDT   Return Sleep Patient with Hans Fowler MD   [facility] ([facility])    [address] [address] [phone]              Who to contact     If you have questions or need follow up information about today's clinic visit or your schedule please contact [facility] directly at [phone].  Normal or non-critical lab and imaging results will be communicated to you by MyChart, letter or phone within 4 business days after the clinic has received the results. If you do not hear from us within 7 days, please contact the clinic through MyChart or phone. If you have a critical or abnormal lab result, we will notify you by phone as soon as possible.  Submit refill requests through [employer] or call your pharmacy and they will forward the refill request to us. Please allow 3 business days for your refill to be completed.          Additional Information About Your Visit        MOBi-LEARNhart Information     [employer] gives you secure access to your electronic health record. If you see a primary care provider, you can also send messages to your care team and make  appointments. If you have questions, please call your primary care clinic.  If you do not have a primary care provider, please call [phone] and they will assist you.        Care EveryWhere ID     This is your Care EveryWhere ID. This could be used by other organizations to access your [place] medical records  [id number]         Blood Pressure from Last 3 Encounters:   06/27/18 106/73   05/30/18 137/80   05/24/18 114/78    Weight from Last 3 Encounters:   06/27/18 79.4 kg (175 lb)   05/30/18 81.9 kg (180 lb 9.6 oz)   05/24/18 81.6 kg (179 lb 12.8 oz)              We Performed the Following     Comprehensive Sleep Study        Primary Care Provider Office Phone # Fax #    Huang Keane [phone]       [address]  [facility] [address]        Equal Access to Services     [facility] : Hadii aad ku hadasho Soomaali, waaxda luqadaha, qaybta kaalmada adeegyada, waxay anthonyin haymattie wiley . So [facility] [phone].    ATENCIÓN: Si habla español, tiene a valiente disposición servicios gratuitos de asistencia lingüística. Cassy al [phone].    We comply with applicable federal civil rights laws and [place] laws. We do not discriminate on the basis of race, color, national origin, age, disability, sex, sexual orientation, or gender identity.            Thank you!     Thank you for choosing [facility]  for your care. Our goal is always to provide you with excellent care. Hearing back from our patients is one way we can continue to improve our services. Please take a few minutes to complete the written survey that you may receive in the mail after your visit with us. Thank you!             Your Updated Medication List - Protect others around you: Learn how to safely use, store and throw away your medicines at www.disposemymeds.org.          This list is accurate as of 8/15/18 11:59 PM.  Always use your most recent med list.                   Brand Name  Dispense Instructions for use Diagnosis    albuterol 108 (90 Base) MCG/ACT inhaler    PROAIR HFA/PROVENTIL HFA/VENTOLIN HFA    2 Inhaler    Inhale 2 puffs into the lungs every 4 hours as needed    Chest tightness       azelastine 0.1 % nasal spray    ASTELIN    1 Bottle    Spray 1 spray into both nostrils 2 times daily    Allergic rhinitis due to cat hair       fexofenadine 180 MG tablet    ALLEGRA    90 tablet    Take 1 tablet (180 mg) by mouth daily    Allergic rhinitis due to cat hair, Allergic rhinitis due to mold, Allergic rhinitis due to dust mite       fluticasone 50 MCG/ACT spray    FLONASE    16 mL    USE TWO SPRAYS IN EACH NOSTRIL DAILY    Chronic rhinitis       montelukast 10 MG tablet    SINGULAIR    30 tablet    Take 1 tablet (10 mg) by mouth At Bedtime    Seasonal allergic rhinitis due to other allergic trigger       triamcinolone 0.1 % cream    KENALOG    453.6 g    Apply sparingly to affected area three times daily as needed    Venous stasis dermatitis of right lower extremity

## 2018-08-20 PROBLEM — G47.33 OSA (OBSTRUCTIVE SLEEP APNEA): Chronic | Status: ACTIVE | Noted: 2018-08-20

## 2018-08-20 LAB — SLPCOMP: NORMAL

## 2018-08-20 NOTE — PROCEDURES
" SLEEP STUDY INTERPRETATION  DIAGNOSTIC POLYSOMNOGRAPHY REPORT      Patient: ABHIJEET LEWIS  YOB: 1960  Study Date: 8/15/2018  MRN: [id number]  Referring Provider: Huang Keane  Ordering Provider: Hans Fowler MD    Indications for Polysomnography: The patient is a 58 y old Male who is 5' 2\" and weighs 175.0 lbs. His BMI is 31.8, Trosper sleepiness scale 22.0 and neck circumference is 41.0 cm. A diagnostic polysomnogram was performed to evaluate for sleepiness    Polysomnogram Data: A full night polysomnogram recorded the standard physiologic parameters including EEG, EOG, EMG, ECG, nasal and oral airflow. Respiratory parameters of chest and abdominal movements were recorded with respiratory inductance plethysmography. Oxygen saturation was recorded by pulse oximetry. Hypopnea scoring rule used: 1B 4%.    Sleep Architecture:   The total recording time of the polysomnogram was 432.0 minutes. The total sleep time was 401.0 minutes. Sleep latency was decreased at 3.8 minutes without the use of a sleep aid. REM latency was 41.5 minutes. Arousal index was 15.3 arousals per hour. Sleep efficiency was normal at 92.8%. Wake after sleep onset was 26.5 minutes. The patient spent 7.1% of total sleep time in Stage N1, 24.6% in Stage N2, 37.9% in Stage N3, and 30.4% in REM. Time in REM supine was 97.0 minutes.    Respiration:     Events ? The polysomnogram revealed a presence of 9 obstructive, 2 central, and 0 mixed apneas resulting in an apnea index of 1.6 events per hour. There were 75 obstructive hypopneas and 0 central hypopneas resulting in an obstructive hypopnea index of 11.2 and central hypopnea index of 0 events per hour. The combined apnea/hypopnea index was 12.9 events per hour (central apnea/hypopnea index was 0.3 events per hour). The REM AHI was 27.5 events per hour. The supine AHI was 14.4 events per hour. The RERA index was 4.2 events per hour.  The RDI was 17.1 events per hour.    Snoring - " was reported as not audible    Respiratory rate and pattern - was notable for normal respiratory rate and pattern.    Sustained Sleep Associated Hypoventilation - Transcutaneous carbon dioxide monitoring was not used, however significant hypoventilation was not suggested by oximetry    Sleep Associated Hypoxemia - (Greater than 5 minutes O2 sat at or below 88%) Baseline oxygen saturation was 94.4%. Lowest oxygen saturation was 76.6%. Time spent less than or equal to 88% was 3.9 minutes. Time spent less than or equal to 89% was 5.8 minutes.    Movement Activity:     Periodic Limb Activity - There were 127 PLMs during the entire study. The PLM index was 19.0 movements per hour. The PLM Arousal Index was 0.1 per hour.    REM EMG Activity - Excessive transient/sustained muscle activity was not present.    Nocturnal Behavior - Abnormal sleep related behaviors were not noted    Bruxism - None apparent.    Cardiac Summary:   The average pulse rate was 64.5 bpm. The minimum pulse rate was 54.9 bpm while the maximum pulse rate was 101.2 bpm.  Arrhythmias were not noted.      Assessment:     Mild obstructive sleep apnea, principally REM-supine related    Periodic limb movements of sleep    Recommendations:    Based on the presence of mild/moderate obstructive sleep apnea and excessive daytime sleepiness, treatment could be empirically initiated with Auto?titrating PAP therapy with a range of 5 to 18 cmH2O. Recommend clinical follow up with sleep management team.    Patient may be a candidate for dental appliance through referral to Sleep Dentistry for the treatment of obstructive sleep apnea and/or socially disruptive snoring.    Positional therapy could be considered    If devices are not acceptable or effective, patient may benefit from evaluation of possible surgical options. If he is interested, would recommend referral to specialized ENT-Sleep provider.    Advice regarding the risks of drowsy driving.    Suggest  optimizing sleep schedule and avoiding sleep deprivation.    Pharmacologic therapy should be used for management of restless legs syndrome only if present and clinically indicated and not based on the presence of periodic limb movements alone.    Diagnostic Codes:   Obstructive Sleep Apnea G47.33      _____________________________________   Electronically Signed By: sofya nevlile md 8/20/18           Range(%) Time in range (min)   0.0 - 89.0 5.8   0.0 - 88.0 3.9         Stage Min(mm Hg) Max(mm Hg)   Wake - -   NREM(1+2+3) - -   REM - -       Range(mmHg) Time in range (min)   55.0 - 100.0 -   Excluded data <20.0 & >90.0 432.5

## 2018-08-22 ENCOUNTER — OFFICE VISIT (OUTPATIENT)
Dept: ORTHOPEDICS | Facility: CLINIC | Age: 58
End: 2018-08-22
Payer: COMMERCIAL

## 2018-08-22 VITALS
OXYGEN SATURATION: 97 % | SYSTOLIC BLOOD PRESSURE: 113 MMHG | DIASTOLIC BLOOD PRESSURE: 72 MMHG | HEIGHT: 65 IN | HEART RATE: 76 BPM | RESPIRATION RATE: 12 BRPM | WEIGHT: 175 LBS | BODY MASS INDEX: 29.16 KG/M2

## 2018-08-22 DIAGNOSIS — G89.29 CHRONIC RIGHT SHOULDER PAIN: Primary | ICD-10-CM

## 2018-08-22 DIAGNOSIS — M25.511 CHRONIC RIGHT SHOULDER PAIN: Primary | ICD-10-CM

## 2018-08-22 PROCEDURE — 99213 OFFICE O/P EST LOW 20 MIN: CPT | Performed by: ORTHOPAEDIC SURGERY

## 2018-08-22 ASSESSMENT — PAIN SCALES - GENERAL: PAINLEVEL: SEVERE PAIN (7)

## 2018-08-22 NOTE — LETTER
8/22/2018         RE: Kody Benton  [address] [address]        Dear Colleague,    Thank you for referring your patient, Kody Benton, to the [facility]. Please see a copy of my visit note below.    CHIEF COMPLAINT:   Chief Complaint   Patient presents with     Right Shoulder - Pain     RECHECK       HISTORY:  Kody Benton is a 58 year old male, right -hand dominant, who is seen for bilateral shoulder pain that started years ago (right>left). History of left shoulder dislocation as a child. Today he has moderate pain, rated a 5/10. Pain is located over the anterolateral shoulder and radiates down in to the mid lateral arm. Pain is worse in the early morning. Had an injection on 5/30/2018. This injection did helped for maybe 24 hours then he started to have pain. Would like to know his options. Presents with his brother in law today.     Onset: severe pain  Symptoms have been worsening since that time.  Aggravated by: shoulder range of motion, weightbearing   Relieved by: at rest  Present symptoms: pain with range of motion, lifting  Pain location: anterolateral shoulder  Pain severity: 7/10  Pain quality: aching and sharp  Frequency of symptoms: frequently  Associated symptoms: none    Treatment up to this point: cortisone injection on 5/30/2018. Physical Therapy.  Prior history of related problems: history of left shoulder dislocation as a child    Significant Orthopedic past medical history: none  Usual level of recreational activity: sedentary  Usual level of work activity: unknown. Unemployed.    Other PMH:   Patient Active Problem List    Diagnosis Date Noted     SERENE (obstructive sleep apnea)- mild (AHI 12) 08/20/2018     Priority: Medium     8/15/2018 [facility] (175.0 lbs) - AHI 12.9, RDI 17.1, Supine AHI 14.4, REM AHI 27.5, Low O2 76.6%, Time Spent ?88% 3.9 minutes / Time Spent ?89% 5.8 minutes.       Bilateral shoulder pain,  unspecified chronicity 06/20/2018     Priority: Medium     Chest tightness 08/07/2017     Priority: Medium     Allergic rhinitis due to cat hair 08/07/2017     Priority: Medium     Allergic rhinitis due to mold 08/07/2017     Priority: Medium     Allergic rhinitis due to dust mite 08/07/2017     Priority: Medium     Esophageal web determined by endoscopy 08/27/2016     Priority: Medium     Gastritis, Helicobacter pylori 08/27/2016     Priority: Medium     Advanced directives, counseling/discussion 10/08/2015     Priority: Medium     Discussed advance care planning with patient; information given to patient to review. October 8, 2015  Sai Claros MA           Down's syndrome 10/08/2015     Priority: Medium     'mosaic'       Unilateral inguinal hernia 10/08/2015     Priority: Medium     Class 1 obesity due to excess calories without serious comorbidity with body mass index (BMI) of 32.0 to 32.9 in adult 06/25/2018     Priority: Low       Surgical Hx:  has a past surgical history that includes no history of surgery.    Medications:   Current Outpatient Prescriptions:      albuterol (PROAIR HFA/PROVENTIL HFA/VENTOLIN HFA) 108 (90 BASE) MCG/ACT Inhaler, Inhale 2 puffs into the lungs every 4 hours as needed, Disp: 2 Inhaler, Rfl: 3     azelastine (ASTELIN) 0.1 % spray, Spray 1 spray into both nostrils 2 times daily, Disp: 1 Bottle, Rfl: 11     fexofenadine (ALLEGRA) 180 MG tablet, Take 1 tablet (180 mg) by mouth daily, Disp: 90 tablet, Rfl: 3     fluticasone (FLONASE) 50 MCG/ACT spray, USE TWO SPRAYS IN EACH NOSTRIL DAILY, Disp: 16 mL, Rfl: 4     montelukast (SINGULAIR) 10 MG tablet, Take 1 tablet (10 mg) by mouth At Bedtime, Disp: 30 tablet, Rfl: 11     triamcinolone (KENALOG) 0.1 % cream, Apply sparingly to affected area three times daily as needed, Disp: 453.6 g, Rfl: 5    Allergies: No Known Allergies    Social Hx:  reports that he has never smoked. He has never used smokeless tobacco. He reports that he does not  "drink alcohol or use illicit drugs.    Family Hx: family history includes Breast Cancer in his sister; Diabetes in his sister. There is no history of Coronary Artery Disease or Colon Cancer..    REVIEW OF SYSTEMS:   CONSTITUTIONAL:NEGATIVE for fever, chills, change in weight  INTEGUMENTARY/SKIN: NEGATIVE for worrisome rashes, moles or lesions  MUSCULOSKELETAL:See HPI above  NEURO: NEGATIVE for weakness, dizziness or paresthesias    This document serves as a record of the services and decisions personally performed and made by Hugo Hernandez MD. It was created on his behalf by Margoth Nair, a trained medical scribe. The creation of this document is based the provider's statements to the medical scribe.    Scribe Margoth Nair 2:32 PM 8/22/2018       PHYSICAL EXAM:  /72  Pulse 76  Resp 12  Ht 1.651 m (5' 5\")  Wt 79.4 kg (175 lb)  SpO2 97%  BMI 29.12 kg/m2   GENERAL APPEARANCE: healthy, alert, no distress; accompanied by his brother in law  SKIN: no suspicious lesions or rashes  NEURO: Normal strength and tone, mentation intact and speech normal  PSYCH:  mentation appears normal and affect normal, not anxious  RESPIRATORY: No increased work of breathing.  VASCULAR: Radial pulses 2+ and brisk cappillary refill     MUSCULOSKELETAL:    RIGHT UPPER EXTREMITY:  Sensation intact to light touch in median, radial, ulnar and axillary nerve distributions  Palpable 2+ radial pulse, brisk capillary refill to all fingers, wwp  Intact epl fpl fdp edc wrist flexion/extension biceps triceps deltoid    RIGHT SHOULDER:  Shoulder Inspection: no swelling, bruising, discoloration, or obvious deformity or asymmetry  Tender: greater tuberosity, lateral shoulder/deltoid, acromion  Nontender to palpation: acromio-clavicular joint, supraspinatus, infraspinatus.   Range of Motion:   Active: forward flexion 170 degrees, external rotation 50 degrees, internal rotation T10  Strength: forward flexion 5-/5, External rotation 5-/5  Impingement: " all grade 2 positive  Special tests: Empty can: negative, Belly press: negative     LEFT UPPER EXTREMITY:  Sensation intact to light touch in median, radial, ulnar and axillary nerve distributions  Palpable 2+ radial pulse, brisk capillary refill to all fingers, wwp  Intact epl fpl fdp edc wrist flexion/extension biceps triceps deltoid    LEFT SHOULDER:  Shoulder Inspection: no swelling, bruising, discoloration, or obvious deformity or asymmetry  Tender: nontender to palpation   Range of Motion:   Active: forward flexion 170 degrees, external rotation 50 degrees, internal rotation T8  Strength: forward flexion 5/5, External rotation 5-/5  Impingement: mild  Special tests: Empty can: negative, Belly press: negative     X-RAY INTERPRETATION: no new images today.  2 views bilateral shoulder obtained 5/24/2018 were reviewed personally in clinic today with the patient. On my review, mild acromio-clavicular degenerative changes left more than the right.       ASSESSMENT: Kody Benton is a 58 year old male, right  -hand dominant with chronic bilateral shoulder pain, likely shoulder impingement syndrome, subacromial bursitis, rotator cuff tendonitis    PLAN:   * Reviewed imaging studies with patient. Also, clinical exam findings. Consistent with bilateral shoulder impingement syndrome, bursitis and tendonintis.    * Discussed with patient that based on physical exam findings, it is not possible to completely rule out rotator cuff tear or other reason for pain.  * An MRI of the right shoulder was ordered for further evaluation.     * Rest  * Activity modification - avoid activities that aggravate symptoms.  * NSAIDS - regular use for inflammation, with food, as long as no contra-indications. Please discuss with pcp if needed.  * Ice twice daily to three times daily.  * Tylenol as needed for pain    * After having the MRI, I would like the patient to return to clinic to discuss findings, treatment options.       The  information in this document, created by a scribe for me, accurately reflects the services I personally performed and the decisions made by me. I have reviewed and approved this document for accuracy.     Hugo Hernandez M.D., M.S.  Dept. of Orthopaedic Surgery  [facility]      Again, thank you for allowing me to participate in the care of your patient.        Sincerely,        Hugo Hernandez MD

## 2018-08-22 NOTE — MR AVS SNAPSHOT
After Visit Summary   8/22/2018    Kody Benton    MRN: [id number]           Patient Information     Date Of Birth          1960        Visit Information        Provider Department      8/22/2018 2:00 PM Hugo Hernandez MD [facility]        Today's Diagnoses     Chronic right shoulder pain    -  1       Follow-ups after your visit        Follow-up notes from your care team     Return if symptoms worsen or fail to improve.      Your next 10 appointments already scheduled     Aug 27, 2018  5:30 PM CDT   MR SHOULDER RIGHT W/O CONTRAST with MGMR1   [facility] ([facility])    [address] [address] [place] [address] [phone]           Take your medicines as usual, unless your doctor tells you not to. Bring a list of your current medicines to your exam (including vitamins, minerals and over-the-counter drugs). Also bring the results of similar scans you may have had.  Please remove any body piercings and hair extensions before you arrive.  Follow your doctor s orders. If you do not, we may have to postpone your exam.  You may or may not receive IV contrast for this exam pending the discretion of the Radiologist.  You do not need to do anything special to prepare.  The MRI machine uses a strong magnet. Please wear clothes without metal (snaps, zippers). A sweatsuit works well, or we may give you a hospital gown.   **IMPORTANT** THE INSTRUCTIONS BELOW ARE ONLY FOR THOSE PATIENTS WHO HAVE BEEN PRESCRIBED SEDATION OR GENERAL ANESTHESIA DURING THEIR MRI PROCEDURE:  IF YOUR DOCTOR PRESCRIBED ORAL SEDATION (take medicine to help you relax during your exam):   You must get the medicine from your doctor (oral medication) before you arrive. Bring the medicine to the exam. Do not take it at home. You ll be told when to take it upon arriving for your exam.   Arrive one hour early. Bring someone who can take you home after the test. Your  medicine will make you sleepy. After the exam, you may not drive, take a bus or take a taxi by yourself.  IF YOUR DOCTOR PRESCRIBED IV SEDATION:   Arrive one hour early. Bring someone who can take you home after the test. Your medicine will make you sleepy. After the exam, you may not drive, take a bus or take a taxi by yourself.   No eating 6 hours before your exam. You may have clear liquids up until 4 hours before your exam. (Clear liquids include water, clear tea, black coffee and fruit juice without pulp.)  IF YOUR DOCTOR PRESCRIBED ANESTHESIA (be asleep for your exam):   Arrive 1 1/2 hours early. Bring someone who can take you home after the test. You may not drive, take a bus or take a taxi by yourself.   No eating 8 hours before your exam. You may have clear liquids up until 4 hours before your exam. (Clear liquids include water, clear tea, black coffee and fruit juice without pulp.)   You will spend four to five hours in the recovery room.  Please call the Imaging Department at your exam site with any questions.            Aug 29, 2018  1:40 PM CDT   Return Sleep Patient with Hans Fowler MD   [facility] ([facility])    [address] [address] [address] [phone]            Aug 29, 2018  2:30 PM CDT   Return Visit with Hugo Hernandez MD   [facility] ([facility])    [address] [facility] [id number] [phone]              Future tests that were ordered for you today     Open Future Orders        Priority Expected Expires Ordered    MR Shoulder Right w/o Contrast Routine  8/22/2019 8/22/2018            Who to contact     If you have questions or need follow up information about today's clinic visit or your schedule please contact [facility] directly at [phone].  Normal or non-critical lab and imaging results will be communicated to you by  "MyChart, letter or phone within 4 business days after the clinic has received the results. If you do not hear from us within 7 days, please contact the clinic through [employer] or phone. If you have a critical or abnormal lab result, we will notify you by phone as soon as possible.  Submit refill requests through [employer] or call your pharmacy and they will forward the refill request to us. Please allow 3 business days for your refill to be completed.          Additional Information About Your Visit        [employer] Information     [employer] gives you secure access to your electronic health record. If you see a primary care provider, you can also send messages to your care team and make appointments. If you have questions, please call your primary care clinic.  If you do not have a primary care provider, please call [phone] and they will assist you.        Care EveryWhere ID     This is your Care EveryWhere ID. This could be used by other organizations to access your [facility] medical records  [id number]        Your Vitals Were     Pulse Respirations Height Pulse Oximetry BMI (Body Mass Index)       76 12 5' 5\" (1.651 m) 97% 29.12 kg/m2        Blood Pressure from Last 3 Encounters:   08/22/18 113/72   06/27/18 106/73   05/30/18 137/80    Weight from Last 3 Encounters:   08/22/18 175 lb (79.4 kg)   06/27/18 175 lb (79.4 kg)   05/30/18 180 lb 9.6 oz (81.9 kg)               Primary Care Provider Office Phone # Fax #    Huang Keane [phone]       [address]  [facility] [address]        Equal Access to Services     [facility]: Hadii aad ku hadasho Soomaali, waaxda luqadaha, qaybta kaalmada albert, robert rai. So [facility] [phone].    ATENCIÓN: Si habla español, tiene a valiente disposición servicios gratuitos de asistencia lingüística. Llame al [phone].    We comply with applicable federal civil rights laws and [place] laws. We do not discriminate " on the basis of race, color, national origin, age, disability, sex, sexual orientation, or gender identity.            Thank you!     Thank you for choosing [facility]  for your care. Our goal is always to provide you with excellent care. Hearing back from our patients is one way we can continue to improve our services. Please take a few minutes to complete the written survey that you may receive in the mail after your visit with us. Thank you!             Your Updated Medication List - Protect others around you: Learn how to safely use, store and throw away your medicines at www.disposemymeds.org.          This list is accurate as of 8/22/18  5:52 PM.  Always use your most recent med list.                   Brand Name Dispense Instructions for use Diagnosis    albuterol 108 (90 Base) MCG/ACT inhaler    PROAIR HFA/PROVENTIL HFA/VENTOLIN HFA    2 Inhaler    Inhale 2 puffs into the lungs every 4 hours as needed    Chest tightness       azelastine 0.1 % nasal spray    ASTELIN    1 Bottle    Spray 1 spray into both nostrils 2 times daily    Allergic rhinitis due to cat hair       fexofenadine 180 MG tablet    ALLEGRA    90 tablet    Take 1 tablet (180 mg) by mouth daily    Allergic rhinitis due to cat hair, Allergic rhinitis due to mold, Allergic rhinitis due to dust mite       fluticasone 50 MCG/ACT spray    FLONASE    16 mL    USE TWO SPRAYS IN EACH NOSTRIL DAILY    Chronic rhinitis       montelukast 10 MG tablet    SINGULAIR    30 tablet    Take 1 tablet (10 mg) by mouth At Bedtime    Seasonal allergic rhinitis due to other allergic trigger       triamcinolone 0.1 % cream    KENALOG    453.6 g    Apply sparingly to affected area three times daily as needed    Venous stasis dermatitis of right lower extremity

## 2018-08-22 NOTE — PROGRESS NOTES
"Subjective:  HPI                    Objective:  System    Physical Exam    General     ROS    Assessment/Plan:    PROGRESS  REPORT    Progress reporting period is from 6/20/18 to 8/17/18.    SUBJECTIVE  Subjective: Patient has been seen 7 times for treatment of Bilateral shoulder pain.  Patient missed his appt on 8/17/18 but did state via the phone that the L shoulder was feeling pretty good and he had pain in the R shoulder still. Patient stated the pain in the R shoulder has not changed much with PT; pain still with reaching overhead, lifting items, and reaching back.  Patient is doing his exercises as he was instructed but no change in R pain complaints.   Current Pain level: 8/10 R shoulder with activity; 0/10 L shoulder  Initial Pain level: 6/10 (both shoulders)   Changes in function: No changes noted in function since last SOAP note     The subjective and objective information are from the last SOAP note on this patient.  The objective findings are from DOS 8/3/18.    OBJECTIVE  Reaching overhead 68\" 5/10 PL; 65\" 3/10 PL.    R shoulder AROM:  WNL with pain @154 abd   Special Tests: (+) impingement R shoulder  Strength is good; pain with MMT [place]/Flexion    L shoulder AROM:  WNL  Special Tests: (-) impingement L shoulder  Strength is good    ASSESSMENT/PLAN  Updated problem list and treatment plan: Diagnosis 1:  Bilateral Shoulder pain  Pain -  hot/cold therapy  Impaired muscle performance - neuro re-education  Decreased function - therapeutic activities  STG/LTGs have been met or progress has been made towards goals:  Yes (See Goal flow sheet completed today.)  Assessment of Progress: The patient's condition is improving for L shoulder.  The patient's condition is unchanged in the R shoulder  Self Management Plans:  Patient has been instructed in a home treatment program.    The patient is returning to your office for a recheck appointment.    Recommendations:  This patient would benefit from further " evaluation for ongoing R shoulder pain    Please refer to the daily flowsheet for treatment today, total treatment time and time spent performing 1:1 timed codes.

## 2018-08-22 NOTE — PROGRESS NOTES
CHIEF COMPLAINT:   Chief Complaint   Patient presents with     Right Shoulder - Pain     RECHECK       HISTORY:  Kody Benton is a 58 year old male, right -hand dominant, who is seen for bilateral shoulder pain that started years ago (right>left). History of left shoulder dislocation as a child. Today he has moderate pain, rated a 5/10. Pain is located over the anterolateral shoulder and radiates down in to the mid lateral arm. Pain is worse in the early morning. Had an injection on 5/30/2018. This injection did helped for maybe 24 hours then he started to have pain. Would like to know his options. Presents with his brother in law today.     Onset: severe pain  Symptoms have been worsening since that time.  Aggravated by: shoulder range of motion, weightbearing   Relieved by: at rest  Present symptoms: pain with range of motion, lifting  Pain location: anterolateral shoulder  Pain severity: 7/10  Pain quality: aching and sharp  Frequency of symptoms: frequently  Associated symptoms: none    Treatment up to this point: cortisone injection on 5/30/2018. Physical Therapy.  Prior history of related problems: history of left shoulder dislocation as a child    Significant Orthopedic past medical history: none  Usual level of recreational activity: sedentary  Usual level of work activity: unknown. Unemployed.    Other PMH:   Patient Active Problem List    Diagnosis Date Noted     SERENE (obstructive sleep apnea)- mild (AHI 12) 08/20/2018     Priority: Medium     8/15/2018 [facility] Sleep Study (175.0 lbs) - AHI 12.9, RDI 17.1, Supine AHI 14.4, REM AHI 27.5, Low O2 76.6%, Time Spent ?88% 3.9 minutes / Time Spent ?89% 5.8 minutes.       Bilateral shoulder pain, unspecified chronicity 06/20/2018     Priority: Medium     Chest tightness 08/07/2017     Priority: Medium     Allergic rhinitis due to cat hair 08/07/2017     Priority: Medium     Allergic rhinitis due to mold 08/07/2017     Priority: Medium     Allergic  rhinitis due to dust mite 08/07/2017     Priority: Medium     Esophageal web determined by endoscopy 08/27/2016     Priority: Medium     Gastritis, Helicobacter pylori 08/27/2016     Priority: Medium     Advanced directives, counseling/discussion 10/08/2015     Priority: Medium     Discussed advance care planning with patient; information given to patient to review. October 8, 2015  Sai Claros MA           Down's syndrome 10/08/2015     Priority: Medium     'mosaic'       Unilateral inguinal hernia 10/08/2015     Priority: Medium     Class 1 obesity due to excess calories without serious comorbidity with body mass index (BMI) of 32.0 to 32.9 in adult 06/25/2018     Priority: Low       Surgical Hx:  has a past surgical history that includes no history of surgery.    Medications:   Current Outpatient Prescriptions:      albuterol (PROAIR HFA/PROVENTIL HFA/VENTOLIN HFA) 108 (90 BASE) MCG/ACT Inhaler, Inhale 2 puffs into the lungs every 4 hours as needed, Disp: 2 Inhaler, Rfl: 3     azelastine (ASTELIN) 0.1 % spray, Spray 1 spray into both nostrils 2 times daily, Disp: 1 Bottle, Rfl: 11     fexofenadine (ALLEGRA) 180 MG tablet, Take 1 tablet (180 mg) by mouth daily, Disp: 90 tablet, Rfl: 3     fluticasone (FLONASE) 50 MCG/ACT spray, USE TWO SPRAYS IN EACH NOSTRIL DAILY, Disp: 16 mL, Rfl: 4     montelukast (SINGULAIR) 10 MG tablet, Take 1 tablet (10 mg) by mouth At Bedtime, Disp: 30 tablet, Rfl: 11     triamcinolone (KENALOG) 0.1 % cream, Apply sparingly to affected area three times daily as needed, Disp: 453.6 g, Rfl: 5    Allergies: No Known Allergies    Social Hx:  reports that he has never smoked. He has never used smokeless tobacco. He reports that he does not drink alcohol or use illicit drugs.    Family Hx: family history includes Breast Cancer in his sister; Diabetes in his sister. There is no history of Coronary Artery Disease or Colon Cancer..    REVIEW OF SYSTEMS:   CONSTITUTIONAL:NEGATIVE for fever, chills,  "change in weight  INTEGUMENTARY/SKIN: NEGATIVE for worrisome rashes, moles or lesions  MUSCULOSKELETAL:See HPI above  NEURO: NEGATIVE for weakness, dizziness or paresthesias    This document serves as a record of the services and decisions personally performed and made by Hugo Hernandez MD. It was created on his behalf by Margoth Nair, a trained medical scribe. The creation of this document is based the provider's statements to the medical scribe.    Scribe Margoth Nair 2:32 PM 8/22/2018       PHYSICAL EXAM:  /72  Pulse 76  Resp 12  Ht 1.651 m (5' 5\")  Wt 79.4 kg (175 lb)  SpO2 97%  BMI 29.12 kg/m2   GENERAL APPEARANCE: healthy, alert, no distress; accompanied by his brother in law  SKIN: no suspicious lesions or rashes  NEURO: Normal strength and tone, mentation intact and speech normal  PSYCH:  mentation appears normal and affect normal, not anxious  RESPIRATORY: No increased work of breathing.  VASCULAR: Radial pulses 2+ and brisk cappillary refill     MUSCULOSKELETAL:    RIGHT UPPER EXTREMITY:  Sensation intact to light touch in median, radial, ulnar and axillary nerve distributions  Palpable 2+ radial pulse, brisk capillary refill to all fingers, wwp  Intact epl fpl fdp edc wrist flexion/extension biceps triceps deltoid    RIGHT SHOULDER:  Shoulder Inspection: no swelling, bruising, discoloration, or obvious deformity or asymmetry  Tender: greater tuberosity, lateral shoulder/deltoid, acromion  Nontender to palpation: acromio-clavicular joint, supraspinatus, infraspinatus.   Range of Motion:   Active: forward flexion 170 degrees, external rotation 50 degrees, internal rotation T10  Strength: forward flexion 5-/5, External rotation 5-/5  Impingement: all grade 2 positive  Special tests: Empty can: negative, Belly press: negative     LEFT UPPER EXTREMITY:  Sensation intact to light touch in median, radial, ulnar and axillary nerve distributions  Palpable 2+ radial pulse, brisk capillary refill to all " fingers, wwp  Intact epl fpl fdp edc wrist flexion/extension biceps triceps deltoid    LEFT SHOULDER:  Shoulder Inspection: no swelling, bruising, discoloration, or obvious deformity or asymmetry  Tender: nontender to palpation   Range of Motion:   Active: forward flexion 170 degrees, external rotation 50 degrees, internal rotation T8  Strength: forward flexion 5/5, External rotation 5-/5  Impingement: mild  Special tests: Empty can: negative, Belly press: negative     X-RAY INTERPRETATION: no new images today.  2 views bilateral shoulder obtained 5/24/2018 were reviewed personally in clinic today with the patient. On my review, mild acromio-clavicular degenerative changes left more than the right.       ASSESSMENT: Kody Benton is a 58 year old male, right  -hand dominant with chronic bilateral shoulder pain, likely shoulder impingement syndrome, subacromial bursitis, rotator cuff tendonitis    PLAN:   * Reviewed imaging studies with patient. Also, clinical exam findings. Consistent with bilateral shoulder impingement syndrome, bursitis and tendonintis.    * Discussed with patient that based on physical exam findings, it is not possible to completely rule out rotator cuff tear or other reason for pain.  * An MRI of the right shoulder was ordered for further evaluation.     * Rest  * Activity modification - avoid activities that aggravate symptoms.  * NSAIDS - regular use for inflammation, with food, as long as no contra-indications. Please discuss with pcp if needed.  * Ice twice daily to three times daily.  * Tylenol as needed for pain    * After having the MRI, I would like the patient to return to clinic to discuss findings, treatment options.       The information in this document, created by a scribe for me, accurately reflects the services I personally performed and the decisions made by me. I have reviewed and approved this document for accuracy.     Hugo Hernandez M.D., M.S.  Dept. of Orthopaedic  Surgery  [facility]

## 2018-08-27 ENCOUNTER — RADIANT APPOINTMENT (OUTPATIENT)
Dept: MRI IMAGING | Facility: CLINIC | Age: 58
End: 2018-08-27
Attending: ORTHOPAEDIC SURGERY
Payer: COMMERCIAL

## 2018-08-27 DIAGNOSIS — M25.511 CHRONIC RIGHT SHOULDER PAIN: ICD-10-CM

## 2018-08-27 DIAGNOSIS — G89.29 CHRONIC RIGHT SHOULDER PAIN: ICD-10-CM

## 2018-08-27 PROCEDURE — 73221 MRI JOINT UPR EXTREM W/O DYE: CPT | Mod: RT | Performed by: RADIOLOGY

## 2018-08-29 ENCOUNTER — OFFICE VISIT (OUTPATIENT)
Dept: ORTHOPEDICS | Facility: CLINIC | Age: 58
End: 2018-08-29
Payer: COMMERCIAL

## 2018-08-29 ENCOUNTER — OFFICE VISIT (OUTPATIENT)
Dept: SLEEP MEDICINE | Facility: CLINIC | Age: 58
End: 2018-08-29
Payer: COMMERCIAL

## 2018-08-29 VITALS — HEART RATE: 72 BPM | WEIGHT: 179 LBS | OXYGEN SATURATION: 93 % | BODY MASS INDEX: 31.71 KG/M2 | HEIGHT: 63 IN

## 2018-08-29 VITALS
DIASTOLIC BLOOD PRESSURE: 73 MMHG | SYSTOLIC BLOOD PRESSURE: 109 MMHG | WEIGHT: 179 LBS | BODY MASS INDEX: 31.71 KG/M2 | HEIGHT: 63 IN | OXYGEN SATURATION: 97 % | HEART RATE: 67 BPM

## 2018-08-29 DIAGNOSIS — G47.33 OSA (OBSTRUCTIVE SLEEP APNEA): Primary | ICD-10-CM

## 2018-08-29 DIAGNOSIS — M25.512 CHRONIC LEFT SHOULDER PAIN: ICD-10-CM

## 2018-08-29 DIAGNOSIS — Z71.89 ADVANCED DIRECTIVES, COUNSELING/DISCUSSION: Chronic | ICD-10-CM

## 2018-08-29 DIAGNOSIS — M25.511 CHRONIC RIGHT SHOULDER PAIN: Primary | ICD-10-CM

## 2018-08-29 DIAGNOSIS — G89.29 CHRONIC LEFT SHOULDER PAIN: ICD-10-CM

## 2018-08-29 DIAGNOSIS — M75.111 INCOMPLETE TEAR OF RIGHT ROTATOR CUFF: ICD-10-CM

## 2018-08-29 DIAGNOSIS — G89.29 CHRONIC RIGHT SHOULDER PAIN: Primary | ICD-10-CM

## 2018-08-29 PROCEDURE — 99213 OFFICE O/P EST LOW 20 MIN: CPT | Performed by: ORTHOPAEDIC SURGERY

## 2018-08-29 PROCEDURE — 99214 OFFICE O/P EST MOD 30 MIN: CPT | Performed by: INTERNAL MEDICINE

## 2018-08-29 ASSESSMENT — PAIN SCALES - GENERAL: PAINLEVEL: EXTREME PAIN (8)

## 2018-08-29 NOTE — MR AVS SNAPSHOT
After Visit Summary   8/29/2018    Kody Benton    MRN: [id number]           Patient Information     Date Of Birth          1960        Visit Information        Provider Department      8/29/2018 2:30 PM Hugo Hernandez MD [facility]        Today's Diagnoses     Chronic right shoulder pain    -  1    Incomplete tear of right rotator cuff        Chronic left shoulder pain           Follow-ups after your visit        Additional Services     ORTHO  REFERRAL       [facility] is referring you to the Orthopedic  Services at [facility].       The  Representative will assist you in the coordination of your Orthopedic and Musculoskeletal Care as prescribed by your physician.    The  Representative will call you within 1 business day to help schedule your appointment, or you may contact the  Representative at:    All areas ~ [phone]     Type of Referral : Non Surgical - [facility] for ultra sound guided intra articular cortisone injection in right shoulder - Dr. Emanuel or other      Timeframe requested: Routine    Coverage of these services is subject to the terms and limitations of your health insurance plan.  Please call member services at your health plan with any benefit or coverage questions.      If X-rays, CT or MRI's have been performed, please contact the facility where they were done to arrange for , prior to your scheduled appointment.  Please bring this referral request to your appointment and present it to your specialist.                  Follow-up notes from your care team     Return if symptoms worsen or fail to improve.      Who to contact     If you have questions or need follow up information about today's clinic visit or your schedule please contact [facility] directly at [phone].  Normal or non-critical lab and  "imaging results will be communicated to you by MyChart, letter or phone within 4 business days after the clinic has received the results. If you do not hear from us within 7 days, please contact the clinic through [employer] or phone. If you have a critical or abnormal lab result, we will notify you by phone as soon as possible.  Submit refill requests through [employer] or call your pharmacy and they will forward the refill request to us. Please allow 3 business days for your refill to be completed.          Additional Information About Your Visit        [facility] Information     [employer] gives you secure access to your electronic health record. If you see a primary care provider, you can also send messages to your care team and make appointments. If you have questions, please call your primary care clinic.  If you do not have a primary care provider, please call [phone] and they will assist you.        Care EveryWhere ID     This is your Care EveryWhere ID. This could be used by other organizations to access your [facility] medical records  [id number]        Your Vitals Were     Pulse Height Pulse Oximetry BMI (Body Mass Index)          67 5' 3\" (1.6 m) 97% 31.71 kg/m2         Blood Pressure from Last 3 Encounters:   08/29/18 109/73   08/22/18 113/72   06/27/18 106/73    Weight from Last 3 Encounters:   08/29/18 179 lb (81.2 kg)   08/29/18 179 lb (81.2 kg)   08/22/18 175 lb (79.4 kg)              We Performed the Following     ORTHO  REFERRAL        Primary Care Provider Office Phone # Fax #    Huang Keane [phone]       [address]  [facility] [address]        Equal Access to Services     [facility] HospitalСВ[facility] : Hadii benny darby hadashnayeli Solilly, waaxda luqadaha, qaybta kaalmada robert price. So [facility] [phone].    ATENCIÓN: Si habla español, tiene a valiente disposición servicios gratuitos de asistencia lingüística. Llpedro al [phone].    We " comply with applicable federal civil rights laws and [place] laws. We do not discriminate on the basis of race, color, national origin, age, disability, sex, sexual orientation, or gender identity.            Thank you!     Thank you for choosing [facility]  for your care. Our goal is always to provide you with excellent care. Hearing back from our patients is one way we can continue to improve our services. Please take a few minutes to complete the written survey that you may receive in the mail after your visit with us. Thank you!             Your Updated Medication List - Protect others around you: Learn how to safely use, store and throw away your medicines at www.disposemymeds.org.          This list is accurate as of 8/29/18 11:59 PM.  Always use your most recent med list.                   Brand Name Dispense Instructions for use Diagnosis    albuterol 108 (90 Base) MCG/ACT inhaler    PROAIR HFA/PROVENTIL HFA/VENTOLIN HFA    2 Inhaler    Inhale 2 puffs into the lungs every 4 hours as needed    Chest tightness       azelastine 0.1 % nasal spray    ASTELIN    1 Bottle    Spray 1 spray into both nostrils 2 times daily    Allergic rhinitis due to cat hair       fexofenadine 180 MG tablet    ALLEGRA    90 tablet    Take 1 tablet (180 mg) by mouth daily    Allergic rhinitis due to cat hair, Allergic rhinitis due to mold, Allergic rhinitis due to dust mite       fluticasone 50 MCG/ACT spray    FLONASE    16 mL    USE TWO SPRAYS IN EACH NOSTRIL DAILY    Chronic rhinitis       montelukast 10 MG tablet    SINGULAIR    30 tablet    Take 1 tablet (10 mg) by mouth At Bedtime    Seasonal allergic rhinitis due to other allergic trigger       triamcinolone 0.1 % cream    KENALOG    453.6 g    Apply sparingly to affected area three times daily as needed    Venous stasis dermatitis of right lower extremity

## 2018-08-29 NOTE — NURSING NOTE
"Chief Complaint   Patient presents with     Study Results       Initial Pulse 72  Ht 1.6 m (5' 3\")  Wt 81.2 kg (179 lb)  SpO2 93%  BMI 31.71 kg/m2 Estimated body mass index is 31.71 kg/(m^2) as calculated from the following:    Height as of this encounter: 1.6 m (5' 3\").    Weight as of this encounter: 81.2 kg (179 lb).    Medication Reconciliation: complete        "

## 2018-08-29 NOTE — PROGRESS NOTES
CHIEF COMPLAINT:   Chief Complaint   Patient presents with     Shoulder Pain     Bilateral shoulder pain. Last injection: right shoulder only 5/30/18. Patient is here for MRI f/u of right shoulder. Patient states his right shoulder is still painful when he tries to reach. Left shoulder pain is now going into the upper arm.        HISTORY:  Kody Benton is a 58 year old male, right -hand dominant, who is seen for bilateral shoulder pain that started years ago (right>left). History of left shoulder dislocation as a child. Returns today to discuss right shoulder MRI results. Today he has severe pain, rated a 8/10. Pain is located over the anterolateral shoulder and radiates down in to the mid lateral arm. Pain is worse in the early morning. Had a subacromial injection on 5/30/2018. This injection did helped for maybe 24 hours then he started to have pain. Would like to know his options. Presents with his brother in law today.     Aggravated by: shoulder range of motion, weightbearing   Relieved by: at rest  Present symptoms: pain with range of motion, lifting  Pain location: anterolateral shoulder  Pain severity: 8/10  Pain quality: aching and sharp  Frequency of symptoms: frequently  Associated symptoms: none    Treatment up to this point: subacromial cortisone injection on 5/30/2018. Physical Therapy.  Prior history of related problems: history of left shoulder dislocation as a child    Significant Orthopedic past medical history: none  Usual level of recreational activity: sedentary  Usual level of work activity: unknown. Unemployed.    Other PMH:   Patient Active Problem List    Diagnosis Date Noted     SERENE (obstructive sleep apnea)- mild (AHI 12) 08/20/2018     Priority: Medium     8/15/2018 [facility] Diagnostic Sleep Study (175.0 lbs) - AHI 12.9, RDI 17.1, Supine AHI 14.4, REM AHI 27.5, Low O2 76.6%, Time Spent ?88% 3.9 minutes / Time Spent ?89% 5.8 minutes.       Bilateral shoulder pain, unspecified chronicity  06/20/2018     Priority: Medium     Chest tightness 08/07/2017     Priority: Medium     Allergic rhinitis due to cat hair 08/07/2017     Priority: Medium     Allergic rhinitis due to mold 08/07/2017     Priority: Medium     Allergic rhinitis due to dust mite 08/07/2017     Priority: Medium     Esophageal web determined by endoscopy 08/27/2016     Priority: Medium     Gastritis, Helicobacter pylori 08/27/2016     Priority: Medium     Advanced directives, counseling/discussion 10/08/2015     Priority: Medium     Discussed advance care planning with patient; information given to patient to review. October 8, 2015 Sai Claros MA       Down's syndrome 10/08/2015     Priority: Medium     'mosaic'       Unilateral inguinal hernia 10/08/2015     Priority: Medium     Class 1 obesity due to excess calories without serious comorbidity with body mass index (BMI) of 32.0 to 32.9 in adult 06/25/2018     Priority: Low       Surgical Hx:  has a past surgical history that includes no history of surgery.    Medications:   Current Outpatient Prescriptions:      albuterol (PROAIR HFA/PROVENTIL HFA/VENTOLIN HFA) 108 (90 BASE) MCG/ACT Inhaler, Inhale 2 puffs into the lungs every 4 hours as needed, Disp: 2 Inhaler, Rfl: 3     azelastine (ASTELIN) 0.1 % spray, Spray 1 spray into both nostrils 2 times daily, Disp: 1 Bottle, Rfl: 11     fexofenadine (ALLEGRA) 180 MG tablet, Take 1 tablet (180 mg) by mouth daily, Disp: 90 tablet, Rfl: 3     fluticasone (FLONASE) 50 MCG/ACT spray, USE TWO SPRAYS IN EACH NOSTRIL DAILY, Disp: 16 mL, Rfl: 4     montelukast (SINGULAIR) 10 MG tablet, Take 1 tablet (10 mg) by mouth At Bedtime, Disp: 30 tablet, Rfl: 11     triamcinolone (KENALOG) 0.1 % cream, Apply sparingly to affected area three times daily as needed, Disp: 453.6 g, Rfl: 5    Allergies: No Known Allergies    Social Hx:  reports that he has never smoked. He has never used smokeless tobacco. He reports that he does not drink alcohol or use illicit  "drugs.    Family Hx: family history includes Breast Cancer in his sister; Diabetes in his sister. There is no history of Coronary Artery Disease or Colon Cancer..    REVIEW OF SYSTEMS:   CONSTITUTIONAL:NEGATIVE for fever, chills, change in weight  INTEGUMENTARY/SKIN: NEGATIVE for worrisome rashes, moles or lesions  MUSCULOSKELETAL:See HPI above  NEURO: NEGATIVE for weakness, dizziness or paresthesias    This document serves as a record of the services and decisions personally performed and made by Hugo Hernandez MD. It was created on his behalf by Margoth Nair, a trained medical scribe. The creation of this document is based the provider's statements to the medical scribe.    Scribe Margoth Nair 2:32 PM 8/22/2018       PHYSICAL EXAM:  /73  Pulse 67  Ht 5' 3\" (1.6 m)  Wt 179 lb (81.2 kg)  SpO2 97%  BMI 31.71 kg/m2   GENERAL APPEARANCE: healthy, alert, no distress; accompanied by his brother in law  SKIN: no suspicious lesions or rashes  NEURO: Normal strength and tone, mentation intact and speech normal  PSYCH:  mentation appears normal and affect normal, not anxious  RESPIRATORY: No increased work of breathing.  VASCULAR: Radial pulses 2+ and brisk cappillary refill     MUSCULOSKELETAL:    RIGHT UPPER EXTREMITY:  Sensation intact to light touch in median, radial, ulnar and axillary nerve distributions  Palpable 2+ radial pulse, brisk capillary refill to all fingers, wwp  Intact epl fpl fdp edc wrist flexion/extension biceps triceps deltoid    RIGHT SHOULDER:  Shoulder Inspection: no swelling, bruising, discoloration, or obvious deformity or asymmetry  Tender: greater tuberosity, lateral shoulder/deltoid, acromion  Nontender to palpation: acromio-clavicular joint, supraspinatus, infraspinatus.   Range of Motion:   Active: forward flexion 170 degrees, external rotation 50 degrees, internal rotation T10  Strength: forward flexion 4+/5, External rotation 5-/5, pain limited  Impingement: all grade 2 " positive  Special tests: Empty can: equivocal due to pain, Belly press: negative     LEFT UPPER EXTREMITY:  Sensation intact to light touch in median, radial, ulnar and axillary nerve distributions  Palpable 2+ radial pulse, brisk capillary refill to all fingers, wwp  Intact epl fpl fdp edc wrist flexion/extension biceps triceps deltoid    LEFT SHOULDER:  Shoulder Inspection: no swelling, bruising, discoloration, or obvious deformity or asymmetry  Tender: nontender to palpation   Range of Motion:   Active: forward flexion 170 degrees, external rotation 50 degrees, internal rotation T8  Strength: forward flexion 5/5, External rotation 5-/5  Impingement: mild  Special tests: Empty can: negative, Belly press: negative     X-RAY INTERPRETATION: no new images today.  2 views bilateral shoulder obtained 5/24/2018 were reviewed personally in clinic today with the patient. On my review, mild acromio-clavicular degenerative changes left more than the right.     MRI 8/27/2018 right shoulder  IMPRESSION:  1. Moderate to high-grade partial-thickness tearing of the anterior  fibers of the right shoulder supraspinatus tendon at the footprint  without full-thickness tear or tendon retraction.  2. Tendinosis of the right shoulder subscapularis tendon without  full-thickness tear or tendon retraction.  3. Normal muscle bulk of the right shoulder rotator cuff musculature.  4. Tendinosis of the intra-articular biceps tendon.  5. Tearing of the superior labrum as it extends posteriorly, as well  as tearing of the posterior labrum, with associated paralabral cyst.  6. Degenerative changes at the acromioclavicular joint.    ASSESSMENT: oKdy Benton is a 58 year old male, right  -hand dominant with chronic bilateral shoulder pain, likely shoulder impingement syndrome, subacromial bursitis, rotator cuff tendonitis with right rotator cuff partial tear.    PLAN:   * Reviewed imaging studies with patient. Also, clinical exam findings.  Consistent with bilateral shoulder impingement syndrome, bursitis and tendonintis. Right shoulder MRI with high grade partial rotator cuff tear.  * discussed with patient finding of rotator cuff tear, its implications and potential treatment options  * discussed various options with patient, including nonop with Physical Therapy, injections, NSAIDS, activity modification versus rotator cuff repair. Risks and benefits of each discussed in detail.  * risks of nonop treatment include continued pain, weakness, progression of tear, development of rotator cuff tear arthropathy and arthrosis, decreased range of motion and stiffness.  * Risks of surgery include, but not limited to: bleeding, infection, pain, scar, damage to adjacent structures (e.g. Nerves, blood vessels, bone, cartilage), temporary or permanent nerve damage, recurrence of symptoms, failure to relieve pain or weakness, stiffness, post-traumatic arthritis, development of rotator cuff tear arthropathy and arthrosis, decreased range of motion and stiffness, need for further surgery, blood clots, pulmonary embolism, risks of anesthesia, death.    * despite these risks, patient would like to continue nonsurgical management.    * Rest  * Activity modification - avoid activities that aggravate symptoms.  * NSAIDS - regular use for inflammation, with food, as long as no contra-indications. Please discuss with pcp if needed.  * Ice twice daily to three times daily.  * Tylenol as needed for pain    * will place a referral for an image-guided right shoulder intra-articular cortisone injection.         Hugo Hernandez M.D., M.S.  Dept. of Orthopaedic Surgery  [facility]

## 2018-08-29 NOTE — MR AVS SNAPSHOT
After Visit Summary   8/29/2018    Kody Benton    MRN: [id number]           Patient Information     Date Of Birth          1960        Visit Information        Provider Department      8/29/2018 1:40 PM Hans Fowler MD [facility]        Today's Diagnoses     SERENE (obstructive sleep apnea)    -  1    Advanced directives, counseling/discussion          Care Instructions      Your BMI is Body mass index is 31.71 kg/(m^2).  Weight management is a personal decision.  If you are interested in exploring weight loss strategies, the following discussion covers the approaches that may be successful. Body mass index (BMI) is one way to tell whether you are at a healthy weight, overweight, or obese. It measures your weight in relation to your height.  A BMI of 18.5 to 24.9 is in the healthy range. A person with a BMI of 25 to 29.9 is considered overweight, and someone with a BMI of 30 or greater is considered obese. More than two-thirds of American adults are considered overweight or obese.  Being overweight or obese increases the risk for further weight gain. Excess weight may lead to heart disease and diabetes.  Creating and following plans for healthy eating and physical activity may help you improve your health.  Weight control is part of healthy lifestyle and includes exercise, emotional health, and healthy eating habits. Careful eating habits lifelong are the mainstay of weight control. Though there are significant health benefits from weight loss, long-term weight loss with diet alone may be very difficult to achieve- studies show long-term success with dietary management in less than 10% of people. Attaining a healthy weight may be especially difficult to achieve in those with severe obesity. In some cases, medications, devices and surgical management might be considered.  What can you do?  If you are overweight or obese and are interested in methods for weight loss, you should  discuss this with your provider.     Consider reducing daily calorie intake by 500 calories.     Keep a food journal.     Avoiding skipping meals, consider cutting portions instead.    Diet combined with exercise helps maintain muscle while optimizing fat loss. Strength training is particularly important for building and maintaining muscle mass. Exercise helps reduce stress, increase energy, and improves fitness. Increasing exercise without diet control, however, may not burn enough calories to loose weight.       Start walking three days a week 10-20 minutes at a time    Work towards walking thirty minutes five days a week     Eventually, increase the speed of your walking for 1-2 minutes at time    In addition, we recommend that you review healthy lifestyles and methods for weight loss available through the National Institutes of Health patient information sites:  http://win.niddk.nih.gov/publications/index.htm    And look into health and wellness programs that may be available through your health insurance provider, employer, local community center, or aishwarya club.    Weight management plan: Patient was referred to their PCP to discuss a diet and exercise plan.              Follow-ups after your visit        Follow-up notes from your care team     Return in about 2 months (around 10/29/2018), or if symptoms worsen or fail to improve, for Routine Visit.      Your next 10 appointments already scheduled     Aug 29, 2018  2:30 PM CDT   [facility]    [address] [place] [id number] [phone]              Who to contact     If you have questions or need follow up information about today's clinic visit or your schedule please contact [facility] directly at [phone].  Normal or non-critical lab and imaging results will be communicated to you by MyChart, letter or phone within 4  "business days after the clinic has received the results. If you do not hear from us within 7 days, please contact the clinic through V[employer] or phone. If you have a critical or abnormal lab result, we will notify you by phone as soon as possible.  Submit refill requests through [employer] or call your pharmacy and they will forward the refill request to us. Please allow 3 business days for your refill to be completed.          Additional Information About Your Visit        Melophone[employer]FlowJob Information     [employer]on gives you secure access to your electronic health record. If you see a primary care provider, you can also send messages to your care team and make appointments. If you have questions, please call your primary care clinic.  If you do not have a primary care provider, please call [phone] and they will assist you.        Care EveryWhere ID     This is your Care EveryWhere ID. This could be used by other organizations to access your [place] medical records  [id number]        Your Vitals Were     Pulse Height Pulse Oximetry BMI (Body Mass Index)          72 1.6 m (5' 3\") 93% 31.71 kg/m2         Blood Pressure from Last 3 Encounters:   08/22/18 113/72   06/27/18 106/73   05/30/18 137/80    Weight from Last 3 Encounters:   08/29/18 81.2 kg (179 lb)   08/22/18 79.4 kg (175 lb)   06/27/18 79.4 kg (175 lb)              We Performed the Following     Comprehensive DME        Primary Care Provider Office Phone # Fax #    Huang Keane [phone]       [address]  [facility] [address]        Equal Access to Services     [facility]: Hadii aad ku hadasho Soomaali, waaxda luqadaha, qaybta kaalmada robert price . So [facility] [phone].    ATENCIÓN: Si habla español, tiene a valiente disposición servicios gratuitos de asistencia lingüística. Llame al [phone].    We comply with applicable federal civil rights laws and [place] laws. We do not discriminate " on the basis of race, color, national origin, age, disability, sex, sexual orientation, or gender identity.            Thank you!     Thank you for choosing [facility]  for your care. Our goal is always to provide you with excellent care. Hearing back from our patients is one way we can continue to improve our services. Please take a few minutes to complete the written survey that you may receive in the mail after your visit with us. Thank you!             Your Updated Medication List - Protect others around you: Learn how to safely use, store and throw away your medicines at www.disposemymeds.org.          This list is accurate as of 8/29/18  2:14 PM.  Always use your most recent med list.                   Brand Name Dispense Instructions for use Diagnosis    albuterol 108 (90 Base) MCG/ACT inhaler    PROAIR HFA/PROVENTIL HFA/VENTOLIN HFA    2 Inhaler    Inhale 2 puffs into the lungs every 4 hours as needed    Chest tightness       azelastine 0.1 % nasal spray    ASTELIN    1 Bottle    Spray 1 spray into both nostrils 2 times daily    Allergic rhinitis due to cat hair       fexofenadine 180 MG tablet    ALLEGRA    90 tablet    Take 1 tablet (180 mg) by mouth daily    Allergic rhinitis due to cat hair, Allergic rhinitis due to mold, Allergic rhinitis due to dust mite       fluticasone 50 MCG/ACT spray    FLONASE    16 mL    USE TWO SPRAYS IN EACH NOSTRIL DAILY    Chronic rhinitis       montelukast 10 MG tablet    SINGULAIR    30 tablet    Take 1 tablet (10 mg) by mouth At Bedtime    Seasonal allergic rhinitis due to other allergic trigger       triamcinolone 0.1 % cream    KENALOG    453.6 g    Apply sparingly to affected area three times daily as needed    Venous stasis dermatitis of right lower extremity

## 2018-08-29 NOTE — LETTER
8/29/2018         RE: Kody Benton  [address] [facility] [address]        Dear Colleague,    Thank you for referring your patient, Kody Benton, to the [facility]. Please see a copy of my visit note below.    CHIEF COMPLAINT:   Chief Complaint   Patient presents with     Shoulder Pain     Bilateral shoulder pain. Last injection: right shoulder only 5/30/18. Patient is here for MRI f/u of right shoulder. Patient states his right shoulder is still painful when he tries to reach. Left shoulder pain is now going into the upper arm.        HISTORY:  Kody Benton is a 58 year old male, right -hand dominant, who is seen for bilateral shoulder pain that started years ago (right>left). History of left shoulder dislocation as a child. Returns today to discuss right shoulder MRI results. Today he has severe pain, rated a 8/10. Pain is located over the anterolateral shoulder and radiates down in to the mid lateral arm. Pain is worse in the early morning. Had a subacromial injection on 5/30/2018. This injection did helped for maybe 24 hours then he started to have pain. Would like to know his options. Presents with his brother in law today.     Aggravated by: shoulder range of motion, weightbearing   Relieved by: at rest  Present symptoms: pain with range of motion, lifting  Pain location: anterolateral shoulder  Pain severity: 8/10  Pain quality: aching and sharp  Frequency of symptoms: frequently  Associated symptoms: none    Treatment up to this point: subacromial cortisone injection on 5/30/2018. Physical Therapy.  Prior history of related problems: history of left shoulder dislocation as a child    Significant Orthopedic past medical history: none  Usual level of recreational activity: sedentary  Usual level of work activity: unknown. Unemployed.    Other PMH:   Patient Active Problem List    Diagnosis Date Noted     SERENE (obstructive sleep apnea)- mild (AHI 12) 08/20/2018      Priority: Medium     8/15/2018 [place] [facility] Sleep Study (175.0 lbs) - AHI 12.9, RDI 17.1, Supine AHI 14.4, REM AHI 27.5, Low O2 76.6%, Time Spent ?88% 3.9 minutes / Time Spent ?89% 5.8 minutes.       Bilateral shoulder pain, unspecified chronicity 06/20/2018     Priority: Medium     Chest tightness 08/07/2017     Priority: Medium     Allergic rhinitis due to cat hair 08/07/2017     Priority: Medium     Allergic rhinitis due to mold 08/07/2017     Priority: Medium     Allergic rhinitis due to dust mite 08/07/2017     Priority: Medium     Esophageal web determined by endoscopy 08/27/2016     Priority: Medium     Gastritis, Helicobacter pylori 08/27/2016     Priority: Medium     Advanced directives, counseling/discussion 10/08/2015     Priority: Medium     Discussed advance care planning with patient; information given to patient to review. October 8, 2015 Sai Claros MA       Down's syndrome 10/08/2015     Priority: Medium     'mosaic'       Unilateral inguinal hernia 10/08/2015     Priority: Medium     Class 1 obesity due to excess calories without serious comorbidity with body mass index (BMI) of 32.0 to 32.9 in adult 06/25/2018     Priority: Low       Surgical Hx:  has a past surgical history that includes no history of surgery.    Medications:   Current Outpatient Prescriptions:      albuterol (PROAIR HFA/PROVENTIL HFA/VENTOLIN HFA) 108 (90 BASE) MCG/ACT Inhaler, Inhale 2 puffs into the lungs every 4 hours as needed, Disp: 2 Inhaler, Rfl: 3     azelastine (ASTELIN) 0.1 % spray, Spray 1 spray into both nostrils 2 times daily, Disp: 1 Bottle, Rfl: 11     fexofenadine (ALLEGRA) 180 MG tablet, Take 1 tablet (180 mg) by mouth daily, Disp: 90 tablet, Rfl: 3     fluticasone (FLONASE) 50 MCG/ACT spray, USE TWO SPRAYS IN EACH NOSTRIL DAILY, Disp: 16 mL, Rfl: 4     montelukast (SINGULAIR) 10 MG tablet, Take 1 tablet (10 mg) by mouth At Bedtime, Disp: 30 tablet, Rfl: 11     triamcinolone (KENALOG) 0.1 % cream, Apply  "sparingly to affected area three times daily as needed, Disp: 453.6 g, Rfl: 5    Allergies: No Known Allergies    Social Hx:  reports that he has never smoked. He has never used smokeless tobacco. He reports that he does not drink alcohol or use illicit drugs.    Family Hx: family history includes Breast Cancer in his sister; Diabetes in his sister. There is no history of Coronary Artery Disease or Colon Cancer..    REVIEW OF SYSTEMS:   CONSTITUTIONAL:NEGATIVE for fever, chills, change in weight  INTEGUMENTARY/SKIN: NEGATIVE for worrisome rashes, moles or lesions  MUSCULOSKELETAL:See HPI above  NEURO: NEGATIVE for weakness, dizziness or paresthesias    This document serves as a record of the services and decisions personally performed and made by Hugo Hernandez MD. It was created on his behalf by Margoth Nair, a trained medical scribe. The creation of this document is based the provider's statements to the medical scribe.    Marcoibvanessa Nair 2:32 PM 8/22/2018       PHYSICAL EXAM:  /73  Pulse 67  Ht 5' 3\" (1.6 m)  Wt 179 lb (81.2 kg)  SpO2 97%  BMI 31.71 kg/m2   GENERAL APPEARANCE: healthy, alert, no distress; accompanied by his brother in law  SKIN: no suspicious lesions or rashes  NEURO: Normal strength and tone, mentation intact and speech normal  PSYCH:  mentation appears normal and affect normal, not anxious  RESPIRATORY: No increased work of breathing.  VASCULAR: Radial pulses 2+ and brisk cappillary refill     MUSCULOSKELETAL:    RIGHT UPPER EXTREMITY:  Sensation intact to light touch in median, radial, ulnar and axillary nerve distributions  Palpable 2+ radial pulse, brisk capillary refill to all fingers, wwp  Intact epl fpl fdp edc wrist flexion/extension biceps triceps deltoid    RIGHT SHOULDER:  Shoulder Inspection: no swelling, bruising, discoloration, or obvious deformity or asymmetry  Tender: greater tuberosity, lateral shoulder/deltoid, acromion  Nontender to palpation: acromio-clavicular " joint, supraspinatus, infraspinatus.   Range of Motion:   Active: forward flexion 170 degrees, external rotation 50 degrees, internal rotation T10  Strength: forward flexion 4+/5, External rotation 5-/5, pain limited  Impingement: all grade 2 positive  Special tests: Empty can: equivocal due to pain, Belly press: negative     LEFT UPPER EXTREMITY:  Sensation intact to light touch in median, radial, ulnar and axillary nerve distributions  Palpable 2+ radial pulse, brisk capillary refill to all fingers, wwp  Intact epl fpl fdp edc wrist flexion/extension biceps triceps deltoid    LEFT SHOULDER:  Shoulder Inspection: no swelling, bruising, discoloration, or obvious deformity or asymmetry  Tender: nontender to palpation   Range of Motion:   Active: forward flexion 170 degrees, external rotation 50 degrees, internal rotation T8  Strength: forward flexion 5/5, External rotation 5-/5  Impingement: mild  Special tests: Empty can: negative, Belly press: negative     X-RAY INTERPRETATION: no new images today.  2 views bilateral shoulder obtained 5/24/2018 were reviewed personally in clinic today with the patient. On my review, mild acromio-clavicular degenerative changes left more than the right.     MRI 8/27/2018 right shoulder  IMPRESSION:  1. Moderate to high-grade partial-thickness tearing of the anterior  fibers of the right shoulder supraspinatus tendon at the footprint  without full-thickness tear or tendon retraction.  2. Tendinosis of the right shoulder subscapularis tendon without  full-thickness tear or tendon retraction.  3. Normal muscle bulk of the right shoulder rotator cuff musculature.  4. Tendinosis of the intra-articular biceps tendon.  5. Tearing of the superior labrum as it extends posteriorly, as well  as tearing of the posterior labrum, with associated paralabral cyst.  6. Degenerative changes at the acromioclavicular joint.    ASSESSMENT: Kody Benton is a 58 year old male, right  -hand dominant with  chronic bilateral shoulder pain, likely shoulder impingement syndrome, subacromial bursitis, rotator cuff tendonitis with right rotator cuff partial tear.    PLAN:   * Reviewed imaging studies with patient. Also, clinical exam findings. Consistent with bilateral shoulder impingement syndrome, bursitis and tendonintis. Right shoulder MRI with high grade partial rotator cuff tear.  * discussed with patient finding of rotator cuff tear, its implications and potential treatment options  * discussed various options with patient, including nonop with Physical Therapy, injections, NSAIDS, activity modification versus rotator cuff repair. Risks and benefits of each discussed in detail.  * risks of nonop treatment include continued pain, weakness, progression of tear, development of rotator cuff tear arthropathy and arthrosis, decreased range of motion and stiffness.  * Risks of surgery include, but not limited to: bleeding, infection, pain, scar, damage to adjacent structures (e.g. Nerves, blood vessels, bone, cartilage), temporary or permanent nerve damage, recurrence of symptoms, failure to relieve pain or weakness, stiffness, post-traumatic arthritis, development of rotator cuff tear arthropathy and arthrosis, decreased range of motion and stiffness, need for further surgery, blood clots, pulmonary embolism, risks of anesthesia, death.    * despite these risks, patient would like to continue nonsurgical management.    * Rest  * Activity modification - avoid activities that aggravate symptoms.  * NSAIDS - regular use for inflammation, with food, as long as no contra-indications. Please discuss with pcp if needed.  * Ice twice daily to three times daily.  * Tylenol as needed for pain    * will place a referral for an image-guided right shoulder intra-articular cortisone injection.         Hugo Hernandez M.D., M.S.  Dept. of Orthopaedic Surgery  [facility]      Again, thank you for allowing me to participate  in the care of your patient.        Sincerely,        Hugo Hernandez MD

## 2018-08-29 NOTE — PATIENT INSTRUCTIONS

## 2018-08-29 NOTE — PROGRESS NOTES
Sleep Study Follow-Up Visit:    Date on this visit: 8/29/2018    Kody Benton comes in today for follow-up of his sleep study done on 8/15/18 at the [facility] for excessive daytime sleepiness (ESS 22), history of difficult to control appetite, probable inadequate total sleep time, minimal symptoms to suggest  sleep disordered breathing.     Study Date: 8/15/2018 (175.0 lb)   Sleep Architecture:   The total recording time of the polysomnogram was 432.0 minutes. The total sleep time was 401.0 minutes. Sleep latency was decreased at 3.8 minutes without the use of a sleep aid. REM latency was 41.5 minutes. Arousal index was 15.3 arousals per hour. Sleep efficiency was normal at 92.8%. Wake after sleep onset was 26.5 minutes. The patient spent 7.1% of total sleep time in Stage N1, 24.6% in Stage N2, 37.9% in Stage N3, and 30.4% in REM. Time in REM supine was 97.0 minutes.     Respiration:     Events ? The polysomnogram revealed a presence of 9 obstructive, 2 central, and 0 mixed apneas resulting in an apnea index of 1.6 events per hour. There were 75 obstructive hypopneas and 0 central hypopneas resulting in an obstructive hypopnea index of 11.2 and central hypopnea index of 0 events per hour. The combined apnea/hypopnea index was 12.9 events per hour (central apnea/hypopnea index was 0.3 events per hour). The REM AHI was 27.5 events per hour. The supine AHI was 14.4 events per hour. The RERA index was 4.2 events per hour.  The RDI was 17.1 events per hour.    Snoring - was reported as not audible    Respiratory rate and pattern - was notable for normal respiratory rate and pattern.    Sustained Sleep Associated Hypoventilation - Transcutaneous carbon dioxide monitoring was not used, however significant hypoventilation was not suggested by oximetry    Sleep Associated Hypoxemia - (Greater than 5 minutes O2 sat at or below 88%) Baseline oxygen saturation was 94.4%. Lowest oxygen saturation  was 76.6%. Time spent less than or equal to 88% was 3.9 minutes. Time spent less than or equal to 89% was 5.8 minutes.     Movement Activity:     Periodic Limb Activity - There were 127 PLMs during the entire study. The PLM index was 19.0 movements per hour. The PLM Arousal Index was 0.1 per hour.    REM EMG Activity - Excessive transient/sustained muscle activity was not present.    Nocturnal Behavior - Abnormal sleep related behaviors were not noted    Bruxism - None apparent.     Cardiac Summary:   The average pulse rate was 64.5 bpm. The minimum pulse rate was 54.9 bpm while the maximum pulse rate was 101.2 bpm.  Arrhythmias were not noted.       These findings were reviewed with patient.     Past medical/surgical history, family history, social history, medications and allergies were reviewed.      Problem List:  Patient Active Problem List    Diagnosis Date Noted     SERENE (obstructive sleep apnea)- mild (AHI 12) 08/20/2018     Priority: Medium     8/15/2018 [place] [facility] Sleep Study (175.0 lbs) - AHI 12.9, RDI 17.1, Supine AHI 14.4, REM AHI 27.5, Low O2 76.6%, Time Spent ?88% 3.9 minutes / Time Spent ?89% 5.8 minutes.       Down's syndrome 10/08/2015     Priority: Medium     'mosaic'       Class 1 obesity due to excess calories without serious comorbidity with body mass index (BMI) of 32.0 to 32.9 in adult 06/25/2018     Priority: Low     Bilateral shoulder pain, unspecified chronicity 06/20/2018     Priority: Low     Chest tightness 08/07/2017     Priority: Low     Allergic rhinitis due to cat hair 08/07/2017     Priority: Low     Allergic rhinitis due to mold 08/07/2017     Priority: Low     Allergic rhinitis due to dust mite 08/07/2017     Priority: Low     Esophageal web determined by endoscopy 08/27/2016     Priority: Low     Gastritis, Helicobacter pylori 08/27/2016     Priority: Low     Advanced directives, counseling/discussion 10/08/2015     Priority: Low     Discussed advance care planning with  patient; information given to patient to review. October 8, 2015 Sai Claros MA       Unilateral inguinal hernia 10/08/2015     Priority: Low        Impression/Plan:    Mild obstructive sleep apnea   Options discussed.  Recommend and elect a trial of autoPAP 5-18 cmH20. He wants to use his brother in laws machine if possible.    He will follow up with me in about 2 month(s).     Twenty-five minutes spent with patient, all of which were spent face-to-face counseling, consulting, coordinating plan of care.      Hans Fowler

## 2018-09-06 ENCOUNTER — OFFICE VISIT (OUTPATIENT)
Dept: ORTHOPEDICS | Facility: CLINIC | Age: 58
End: 2018-09-06
Payer: COMMERCIAL

## 2018-09-06 VITALS
OXYGEN SATURATION: 98 % | DIASTOLIC BLOOD PRESSURE: 66 MMHG | HEART RATE: 76 BPM | HEIGHT: 63 IN | WEIGHT: 179 LBS | BODY MASS INDEX: 31.71 KG/M2 | SYSTOLIC BLOOD PRESSURE: 119 MMHG

## 2018-09-06 DIAGNOSIS — M75.111 INCOMPLETE TEAR OF RIGHT ROTATOR CUFF: Primary | ICD-10-CM

## 2018-09-06 PROCEDURE — 20611 DRAIN/INJ JOINT/BURSA W/US: CPT | Mod: RT | Performed by: FAMILY MEDICINE

## 2018-09-06 PROCEDURE — 99207 ZZC DROP WITH A PROCEDURE: CPT | Performed by: FAMILY MEDICINE

## 2018-09-06 RX ORDER — TRIAMCINOLONE ACETONIDE 40 MG/ML
40 INJECTION, SUSPENSION INTRA-ARTICULAR; INTRAMUSCULAR ONCE
Qty: 1 ML | Refills: 0 | OUTPATIENT
Start: 2018-09-06 | End: 2018-09-06

## 2018-09-06 ASSESSMENT — PAIN SCALES - GENERAL: PAINLEVEL: EXTREME PAIN (8)

## 2018-09-06 NOTE — MR AVS SNAPSHOT
After Visit Summary   2018    Kody Benton    MRN: [id number]           Patient Information     Date Of Birth          1960        Visit Information        Provider Department      2018 2:20 PM Robby Emanuel, DO [facility]        Today's Diagnoses     Incomplete tear of right rotator cuff    -  1      Care Instructions    Thanks for coming today.  Ortho/Sports Medicine Clinic  [address] [place] [address]    To schedule future appointments in Ortho Clinic, you may call [phone].    To schedule ordered imaging by your provider:   Call Central Imaging [phone]    To schedule an injection ordered by your provider:  Call Central Imaging Injection scheduling line: [phone]  [employer] available online at:  [email or web address]    Please call if any further questions or concerns ([phone]).  Clinic hours 8 am to 5 pm.    Return to clinic (call) if symptoms worsen or fail to improve.            Follow-ups after your visit        Future tests that were ordered for you today     Open Future Orders        Priority Expected Expires Ordered    US Guided Needle Placement Routine  2019            Who to contact     If you have questions or need follow up information about today's clinic visit or your schedule please contact [facility] directly at [phone].  Normal or non-critical lab and imaging results will be communicated to you by MyChart, letter or phone within 4 business days after the clinic has received the results. If you do not hear from us within 7 days, please contact the clinic through MyChart or phone. If you have a critical or abnormal lab result, we will notify you by phone as soon as possible.  Submit refill requests through [employer] or call your pharmacy and they will forward the refill request to us. Please allow 3 business days for your refill to be completed.          Additional  "Information About Your Visit        [email or web address] Information     [employer] gives you secure access to your electronic health record. If you see a primary care provider, you can also send messages to your care team and make appointments. If you have questions, please call your primary care clinic.  If you do not have a primary care provider, please call [phone] and they will assist you.      [employer] is an electronic gateway that provides easy, online access to your medical records. With [employer], you can request a clinic appointment, read your test results, renew a prescription or communicate with your care team.     To access your existing account, please contact your [facility] or call [phone] for assistance.        Care EveryWhere ID     This is your Care EveryWhere ID. This could be used by other organizations to access your [place] medical records  [id number]        Your Vitals Were     Pulse Height Pulse Oximetry BMI (Body Mass Index)          76 1.6 m (5' 3\") 98% 31.71 kg/m2         Blood Pressure from Last 3 Encounters:   09/06/18 119/66   08/29/18 109/73   08/22/18 113/72    Weight from Last 3 Encounters:   09/06/18 81.2 kg (179 lb)   08/29/18 81.2 kg (179 lb)   08/29/18 81.2 kg (179 lb)              We Performed the Following     HC ARTHROCENTESIS ASPIR&/INJ MAJOR JT/BURSA W/US          Today's Medication Changes          These changes are accurate as of 9/6/18  3:00 PM.  If you have any questions, ask your nurse or doctor.               Start taking these medicines.        Dose/Directions    triamcinolone acetonide 40 MG/ML injection   Commonly known as:  KENALOG   Used for:  Incomplete tear of right rotator cuff   Started by:  Robby Emanuel DO        Dose:  40 mg   1 mL (40 mg) by INTRA-ARTICULAR route once for 1 dose   Quantity:  1 mL   Refills:  0            Where to get your medicines      Some of these will need a paper prescription and others can be bought over " the counter.  Ask your nurse if you have questions.     You don't need a prescription for these medications     triamcinolone acetonide 40 MG/ML injection                Primary Care Provider Office Phone # Fax #    Huang Keane [phone]       [address]  [facility] [address]        Equal Access to Services     [facility]ТЛАНА : Hadii aad ku hadasho Soomaali, waaxda luqadaha, qaybta kaalmada adeegyada, waxay coco hayalvinn daryl khwatsonkarie wiley . So [facility] [phone].    ATENCIÓN: Si habla español, tiene a valiente disposición servicios gratuitos de asistencia lingüística. LlC[facility] [phone].    We comply with applicable federal civil rights laws and [place] laws. We do not discriminate on the basis of race, color, national origin, age, disability, sex, sexual orientation, or gender identity.            Thank you!     Thank you for choosing [facility]  for your care. Our goal is always to provide you with excellent care. Hearing back from our patients is one way we can continue to improve our services. Please take a few minutes to complete the written survey that you may receive in the mail after your visit with us. Thank you!             Your Updated Medication List - Protect others around you: Learn how to safely use, store and throw away your medicines at www.disposemymeds.org.          This list is accurate as of 9/6/18  3:00 PM.  Always use your most recent med list.                   Brand Name Dispense Instructions for use Diagnosis    albuterol 108 (90 Base) MCG/ACT inhaler    PROAIR HFA/PROVENTIL HFA/VENTOLIN HFA    2 Inhaler    Inhale 2 puffs into the lungs every 4 hours as needed    Chest tightness       azelastine 0.1 % nasal spray    ASTELIN    1 Bottle    Spray 1 spray into both nostrils 2 times daily    Allergic rhinitis due to cat hair       fexofenadine 180 MG tablet    ALLEGRA    90 tablet    Take 1 tablet (180 mg) by mouth daily    Allergic rhinitis  due to cat hair, Allergic rhinitis due to mold, Allergic rhinitis due to dust mite       fluticasone 50 MCG/ACT spray    FLONASE    16 mL    USE TWO SPRAYS IN EACH NOSTRIL DAILY    Chronic rhinitis       montelukast 10 MG tablet    SINGULAIR    30 tablet    Take 1 tablet (10 mg) by mouth At Bedtime    Seasonal allergic rhinitis due to other allergic trigger       triamcinolone 0.1 % cream    KENALOG    453.6 g    Apply sparingly to affected area three times daily as needed    Venous stasis dermatitis of right lower extremity       triamcinolone acetonide 40 MG/ML injection    KENALOG    1 mL    1 mL (40 mg) by INTRA-ARTICULAR route once for 1 dose    Incomplete tear of right rotator cuff

## 2018-09-06 NOTE — NURSING NOTE
"Kody Benton's chief complaint for this visit includes:  Chief Complaint   Patient presents with     Consult     right shoulder pain. pt is requesting right shoulder injection.      PCP: Huang Keane    Referring Provider:  Hugo Hernandez MD  [address] [facility]  [place] [address]    /66  Pulse 76  Ht 1.6 m (5' 3\")  Wt 81.2 kg (179 lb)  SpO2 98%  BMI 31.71 kg/m2  Extreme Pain (8)     Do you need any medication refills at today's visit? No        "

## 2018-09-06 NOTE — LETTER
"    9/6/2018         RE: Kody Benton  [address] [facility] [address]        Dear Colleague,    Thank you for referring your patient, Kody Benton, to the [facility]. Please see a copy of my visit note below.    Bhargav is here for a right subacromial bursa injection.  He is seen at the request of Dr. Hernandez.    Vitals:    09/06/18 1424   BP: 119/66   Pulse: 76   SpO2: 98%   Weight: 81.2 kg (179 lb)   Height: 1.6 m (5' 3\")       Subacromial Bursa - Ultrasound Guided  The patient was informed of the risks and the benefits of the procedure and a written consent was signed.  The patient s right shoulder was prepped with chlorhexidine in sterile fashion.   40 mg of triamcinolone suspension was drawn up into a 5 mL syringe with 4 mL of 1% lidocaine.  Injection was performed using sterile technique.  Under ultrasound guidance a 1.5-inch 25-gauge needle was used to enter the right subacromial bursa.  Anterolateral approach was used with arm held in Crass position.  Needle placement was visualized and documented with ultrasound.  Ultrasound visualization was necessary to ensure placement in to the bursa and not the rotator cuff tendon which could potentially cause further tendon damage.  Injection performed long axis to the probe.  Injection solution visualized within the joint space.  Images were permanently stored for the patient's record.  There were no complications. The patient tolerated the procedure well. There was negligible bleeding.   The patient was instructed to ice the shoulder upon leaving clinic and refrain from overuse over the next 3 days.   The patient was instructed to call or go to the emergency room with any unusual pain, swelling, redness, or if otherwise concerned.  Kenalog: NDC 7077-2899-60        Michael Emanuel DO CAQSM      Again, thank you for allowing me to participate in the care of your patient.        Sincerely,        Robby Emanuel, DO    "

## 2018-09-06 NOTE — PROGRESS NOTES
"Bhargav is here for a right subacromial bursa injection.  He is seen at the request of Dr. Hernandez.    Vitals:    09/06/18 1424   BP: 119/66   Pulse: 76   SpO2: 98%   Weight: 81.2 kg (179 lb)   Height: 1.6 m (5' 3\")       Subacromial Bursa - Ultrasound Guided  The patient was informed of the risks and the benefits of the procedure and a written consent was signed.  The patient s right shoulder was prepped with chlorhexidine in sterile fashion.   40 mg of triamcinolone suspension was drawn up into a 5 mL syringe with 4 mL of 1% lidocaine.  Injection was performed using sterile technique.  Under ultrasound guidance a 1.5-inch 25-gauge needle was used to enter the right subacromial bursa.  Anterolateral approach was used with arm held in Crass position.  Needle placement was visualized and documented with ultrasound.  Ultrasound visualization was necessary to ensure placement in to the bursa and not the rotator cuff tendon which could potentially cause further tendon damage.  Injection performed long axis to the probe.  Injection solution visualized within the joint space.  Images were permanently stored for the patient's record.  There were no complications. The patient tolerated the procedure well. There was negligible bleeding.   The patient was instructed to ice the shoulder upon leaving clinic and refrain from overuse over the next 3 days.   The patient was instructed to call or go to the emergency room with any unusual pain, swelling, redness, or if otherwise concerned.  Kenalog: NDC 1050-2220-65        Michael Emanuel DO CAQSM    "

## 2018-09-06 NOTE — PATIENT INSTRUCTIONS
Thanks for coming today.  Ortho/Sports Medicine Clinic  [address] [place] [address]    To schedule future appointments in Ortho Clinic, you may call [phone].    To schedule ordered imaging by your provider:   Call Central Imaging [phone]    To schedule an injection ordered by your provider:  Call Central Imaging Injection scheduling line: [phone]  [employer] available online at:  [email or web address]    Please call if any further questions or concerns ([phone]).  Clinic hours 8 am to 5 pm.    Return to clinic (call) if symptoms worsen or fail to improve.

## 2018-09-19 ENCOUNTER — DOCUMENTATION ONLY (OUTPATIENT)
Dept: SLEEP MEDICINE | Facility: CLINIC | Age: 58
End: 2018-09-19
Payer: COMMERCIAL

## 2018-09-19 DIAGNOSIS — G47.33 OSA (OBSTRUCTIVE SLEEP APNEA): ICD-10-CM

## 2018-09-19 NOTE — PROGRESS NOTES
Patient was offered choice of vendor and chose [facility].  Kody Benton was set up at [place] on September 19, 2018 Patient received a [employer] DreamStation Auto. Pressures were set at 5-15 cm H2O.   Patient s ramp is 5 cm H2O for 30 min and FLEX/EPR is C Flex, 2.  Patient received a [employer]med Mask name: Airfit F20 Full Face mask Size Small, heated tubing and heated humidifier.  Patient is enrolled in the STM Program and does need to meet compliance. Patient has a follow up on TBD (please schedule during STM call) with Dr. Fowler.    Laurie Giron

## 2018-09-24 ENCOUNTER — DOCUMENTATION ONLY (OUTPATIENT)
Dept: SLEEP MEDICINE | Facility: CLINIC | Age: 58
End: 2018-09-24

## 2018-09-24 ENCOUNTER — TELEPHONE (OUTPATIENT)
Dept: FAMILY MEDICINE | Facility: CLINIC | Age: 58
End: 2018-09-24

## 2018-09-24 DIAGNOSIS — G47.33 OSA (OBSTRUCTIVE SLEEP APNEA): ICD-10-CM

## 2018-09-24 NOTE — TELEPHONE ENCOUNTER
Panel Management Review      5/24/2018  Last Office Visit with this department: 5/24/2018    Fail List measure: Colon Screening      Patient is due/failing the following:   COLONOSCOPY    Action needed:   None    Type of outreach:    Sent letter.    Questions for provider review:    None                                                                                                                                    Sai Mitchell MA      Chart routed to NA .

## 2018-09-24 NOTE — PROGRESS NOTES
3 DAY STM VISIT    Diagnostic AHI: 12.9 PSG    Patient contacted for 3 day STM visit  Subjective measures:  Pt hasn't started using yet.  He will start tonight and if he has any issues or concerns, he will call.     Device type: Auto-CPAP  PAP settings from order::  CPAP min 5 cm  H20       CPAP max 15 cm  H20  Mask type:    Nasal Mask     Assessment: No usage reporting. Hasn't started using yet  Action plan: Pt to have f/u 14 day STM visit.  Patient has a follow up visit scheduled:   yes within 31-90 days of set up.

## 2018-10-04 ENCOUNTER — DOCUMENTATION ONLY (OUTPATIENT)
Dept: SLEEP MEDICINE | Facility: CLINIC | Age: 58
End: 2018-10-04

## 2018-10-04 DIAGNOSIS — G47.33 OSA (OBSTRUCTIVE SLEEP APNEA): ICD-10-CM

## 2018-10-04 NOTE — PROGRESS NOTES
14 DAY STM VISIT    Diagnostic AHI: 12.9    PSG    Subjective measures:  Pt states he has been using it the last few nights.  He initially had some issues with humidity and these settings have been adjusted.      Assessment: Pt not meeting objective benchmarks for AHI and compliance  Patient meeting subjective benchmarks.   Action plan: pt to have 30 day STM visit.   Device type: Auto-CPAP  PAP settings: CPAP min 5 cm  H20     CPAP max 15 cm  H20     90th % [id number] cm  H20    Mask type:   Nasal Mask     Objective measures: 14 day rolling measures         Compliance  7 %      % of night spent in large leak  0 % last  upload      AHI 15.8   last  Upload-only few nights of usage with low usage time.        Average number of minutes 29     Average hours of usage 0.5              Objective measure goal  Compliance   Goal >70%  Leak   Goal < 10%  AHI  Goal < 5  Usage  Goal >240

## 2018-10-22 ENCOUNTER — DOCUMENTATION ONLY (OUTPATIENT)
Dept: SLEEP MEDICINE | Facility: CLINIC | Age: 58
End: 2018-10-22

## 2018-10-22 DIAGNOSIS — G47.33 OSA (OBSTRUCTIVE SLEEP APNEA): ICD-10-CM

## 2018-10-22 NOTE — PROGRESS NOTES
30 DAY STM VISIT    Diagnostic AHI: 12.9 PSG      Subjective measures:   Pt states that he's struggling with mask leak and taking it off.  He's going to come in for a mask fitting on Thursday 10/25/18    Assessment: Pt not meeting objective benchmarks for AHI, leak and compliance  Patient failing following subjective benchmarks: mask discomfort   Action plan: schedule mask fit appointment and 2 week STM recheck appt scheduled  Patient hasscheduled a follow up visit with Dr. Fowler on 11/12/18  Device type: Auto-CPAP  PAP settings: CPAP min 5 cm  H20     CPAP max 15 cm  H20     90th % pressure9.6 cm  H20    Mask type:  Nasal Mask    Objective measures: 14 day rolling measures         Compliance  14 %     % of night spent in large leak  17 % last  upload      AHI 15.12   last  upload      Average number of minutes 96          Objective measure goal  Compliance   Goal >70%  Leak   Goal < 10%  AHI  Goal < 5  Usage  Goal >240

## 2018-10-25 ENCOUNTER — DOCUMENTATION ONLY (OUTPATIENT)
Dept: SLEEP MEDICINE | Facility: CLINIC | Age: 58
End: 2018-10-25

## 2018-10-25 DIAGNOSIS — G47.33 OSA (OBSTRUCTIVE SLEEP APNEA): ICD-10-CM

## 2018-10-26 ENCOUNTER — MYC REFILL (OUTPATIENT)
Dept: FAMILY MEDICINE | Facility: CLINIC | Age: 58
End: 2018-10-26

## 2018-10-26 DIAGNOSIS — J30.89 ALLERGIC RHINITIS DUE TO DUST MITE: ICD-10-CM

## 2018-10-26 DIAGNOSIS — J30.81 ALLERGIC RHINITIS DUE TO CAT HAIR: ICD-10-CM

## 2018-10-26 DIAGNOSIS — J30.89 ALLERGIC RHINITIS DUE TO MOLD: ICD-10-CM

## 2018-10-26 NOTE — PROGRESS NOTES
[facility] brother in law Colin [facility] October 25, 2018. Patient requested to switch masks because poor seal/leak and general discomfort.  Pt is taking it off during the night and looking at the download, it appears that the mask is leaking and it's causing him to take it off.  On the nights where his leak is well controlled, he is able to wear it longer and his AHI is WNL on those nights. I went over the download in detail with the two of them. Patient tried on the followings masks: Mirage Fx standard   Patient selected  Resmed, type Mirage FxNasal mask Standard. He found this mask much more comfortable and the leak was well controlled while laying down.  He is going to try wearing it during the day when he's watching TV as he does tend to fall asleep.  His brother in law is also going to encourage him to wear it and help him make sure it is fitting properly. If they have issues, they will call.

## 2018-10-29 RX ORDER — FEXOFENADINE HCL 180 MG/1
180 TABLET ORAL DAILY
Qty: 90 TABLET | Refills: 3
Start: 2018-10-29

## 2018-10-29 NOTE — TELEPHONE ENCOUNTER
[facility]  Original authorizing provider: MD Kody Elizabeth would like a refill of the following medications:  fexofenadine (ALLEGRA) 180 MG tablet [Huang Keane MD]    Preferred pharmacy: [id number] [place] [id number] [facility]    Comment:  This message is being sent by Ariel Cobb on behalf of Kody Benton

## 2018-10-29 NOTE — TELEPHONE ENCOUNTER
"Requested Prescriptions   Pending Prescriptions Disp Refills     fexofenadine (ALLEGRA) 180 MG tablet 90 tablet 3     Sig: Take 1 tablet (180 mg) by mouth daily    Antihistamines Protocol Passed    10/29/2018  2:53 PM       Passed - Patient is 3-64 years of age    Apply weight-based dosing for peds patients age 3 - 12 years of age.    Forward request to provider for patients under the age of 3 or over the age of 64.         Passed - Recent (12 mo) or future (30 days) visit within the authorizing provider's specialty    Patient had office visit in the last 12 months or has a visit in the next 30 days with authorizing provider or within the authorizing provider's specialty.  See \"Patient Info\" tab in inbasket, or \"Choose Columns\" in Meds & Orders section of the refill encounter.                "

## 2018-10-29 NOTE — TELEPHONE ENCOUNTER
90 day supply with 3 refills sent on 6/15/18. Should have refills on file at pharmacy. Too soon to refill.         Kia Lares RN, BSN

## 2018-11-05 ENCOUNTER — DOCUMENTATION ONLY (OUTPATIENT)
Dept: SLEEP MEDICINE | Facility: CLINIC | Age: 58
End: 2018-11-05

## 2018-11-05 DIAGNOSIS — G47.33 OSA (OBSTRUCTIVE SLEEP APNEA): ICD-10-CM

## 2018-11-05 NOTE — PROGRESS NOTES
STM Recheck Visit    Subjective measures:   Pt is using the nasal mask nights and he finds that it's much more comfortable. He's been able to increase the amount of time that he uses the mask. He's going to continue to try and use it during the day as well to get used to it.     Assessment: Pt not meeting objective benchmarks for compliance  Patient meeting subjective benchmarks.   Action plan: pt to have 6 month [facility] visit  Patient has a follow up visit with Dr. Fowler on 11/12/18  Device type: Auto-CPAP  PAP settings: CPAP min 5 cm  H20     CPAP max 15 cm  H20     90th % pressure 10 cm  H20    Objective measures: 14 day rolling measures         Compliance  35 %     % of night spent in large leak  7 % last  upload      AHI 9.52   last  upload      Average number of minutes 199    Diagnostic AHI: 12.9           Objective measure goal  Compliance   Goal >70%  Leak   Goal < 10%  AHI  Goal < 5  Usage  Goal >240

## 2018-11-12 ENCOUNTER — OFFICE VISIT (OUTPATIENT)
Dept: SLEEP MEDICINE | Facility: CLINIC | Age: 58
End: 2018-11-12
Payer: COMMERCIAL

## 2018-11-12 VITALS
DIASTOLIC BLOOD PRESSURE: 66 MMHG | HEART RATE: 72 BPM | BODY MASS INDEX: 31.89 KG/M2 | WEIGHT: 180 LBS | SYSTOLIC BLOOD PRESSURE: 100 MMHG | OXYGEN SATURATION: 98 % | HEIGHT: 63 IN

## 2018-11-12 DIAGNOSIS — G47.33 OSA (OBSTRUCTIVE SLEEP APNEA): Primary | ICD-10-CM

## 2018-11-12 PROCEDURE — 99214 OFFICE O/P EST MOD 30 MIN: CPT | Performed by: INTERNAL MEDICINE

## 2018-11-12 NOTE — MR AVS SNAPSHOT
After Visit Summary   11/12/2018    Kody Benton    MRN: [id number]           Patient Information     Date Of Birth          1960        Visit Information        Provider Department      11/12/2018 10:00 AM Hans Fowler MD [facility]        Today's Diagnoses     SERENE (obstructive sleep apnea)    -  1      Care Instructions      Your BMI is Body mass index is 31.89 kg/(m^2).  Weight management is a personal decision.  If you are interested in exploring weight loss strategies, the following discussion covers the approaches that may be successful. Body mass index (BMI) is one way to tell whether you are at a healthy weight, overweight, or obese. It measures your weight in relation to your height.  A BMI of 18.5 to 24.9 is in the healthy range. A person with a BMI of 25 to 29.9 is considered overweight, and someone with a BMI of 30 or greater is considered obese. More than two-thirds of American adults are considered overweight or obese.  Being overweight or obese increases the risk for further weight gain. Excess weight may lead to heart disease and diabetes.  Creating and following plans for healthy eating and physical activity may help you improve your health.  Weight control is part of healthy lifestyle and includes exercise, emotional health, and healthy eating habits. Careful eating habits lifelong are the mainstay of weight control. Though there are significant health benefits from weight loss, long-term weight loss with diet alone may be very difficult to achieve- studies show long-term success with dietary management in less than 10% of people. Attaining a healthy weight may be especially difficult to achieve in those with severe obesity. In some cases, medications, devices and surgical management might be considered.  What can you do?  If you are overweight or obese and are interested in methods for weight loss, you should discuss this with your provider.     Consider  reducing daily calorie intake by 500 calories.     Keep a food journal.     Avoiding skipping meals, consider cutting portions instead.    Diet combined with exercise helps maintain muscle while optimizing fat loss. Strength training is particularly important for building and maintaining muscle mass. Exercise helps reduce stress, increase energy, and improves fitness. Increasing exercise without diet control, however, may not burn enough calories to loose weight.       Start walking three days a week 10-20 minutes at a time    Work towards walking thirty minutes five days a week     Eventually, increase the speed of your walking for 1-2 minutes at time    In addition, we recommend that you review healthy lifestyles and methods for weight loss available through the National Institutes of Health patient information sites:  http://win.niddk.nih.gov/publications/index.htm    And look into health and wellness programs that may be available through your health insurance provider, employer, local community center, or aishwarya club.    Weight management plan: Patient was referred to their PCP to discuss a diet and exercise plan.              Follow-ups after your visit        Follow-up notes from your care team     Return in about 3 months (around 2/12/2019) for Routine Visit.      Your next 10 appointments already scheduled     Feb 11, 2019 10:30 AM CST   Return Sleep Patient with Hans Fowler MD   [facility] ([facility])    [address] [place] [id number] [phone]              Who to contact     If you have questions or need follow up information about today's clinic visit or your schedule please contact [facility] directly at [phone].  Normal or non-critical lab and imaging results will be communicated to you by MyChart, letter or phone within 4 business days after the clinic has received the results. If you do not hear from  "us within 7 days, please contact the clinic through [employer] or phone. If you have a critical or abnormal lab result, we will notify you by phone as soon as possible.  Submit refill requests through [employer] or call your pharmacy and they will forward the refill request to us. Please allow 3 business days for your refill to be completed.          Additional Information About Your Visit        [employer] Information     [employer] gives you secure access to your electronic health record. If you see a primary care provider, you can also send messages to your care team and make appointments. If you have questions, please call your primary care clinic.  If you do not have a primary care provider, please call [phone] and they will assist you.        Care EveryWhere ID     This is your Care EveryWhere ID. This could be used by other organizations to access your [facility] medical records  [id number]        Your Vitals Were     Pulse Height Pulse Oximetry BMI (Body Mass Index)          72 1.6 m (5' 3\") 98% 31.89 kg/m2         Blood Pressure from Last 3 Encounters:   11/12/18 100/66   09/06/18 119/66   08/29/18 109/73    Weight from Last 3 Encounters:   11/12/18 81.6 kg (180 lb)   09/06/18 81.2 kg (179 lb)   08/29/18 81.2 kg (179 lb)              We Performed the Following     Comprehensive DME          Today's Medication Changes          These changes are accurate as of 11/12/18 10:49 AM.  If you have any questions, ask your nurse or doctor.               Start taking these medicines.        Dose/Directions    order for DME   Used for:  SERENE (obstructive sleep apnea)   Started by:  Hans Fowler MD        AUTOPAP [id number]   Quantity:  1 Device   Refills:  0            Where to get your medicines      Information about where to get these medications is not yet available     ! Ask your nurse or doctor about these medications     order for DME                Primary Care Provider Office Phone # Fax #    Huang Keane MD " [phone]       [address]  [facility] [address]        Equal Access to Services     BLAIR WILLIAMSON : Hadii aad ku hadtabitha Solilly, waaxda luqadaha, qaybta kaalmada albert, robert hansondrake fredi. So [facility] [phone].    ATENCIÓN: Si habla español, tiene a valiente disposición servicios gratuitos de asistencia lingüística. Llame al [phone].    We comply with applicable federal civil rights laws and [place] laws. We do not discriminate on the basis of race, color, national origin, age, disability, sex, sexual orientation, or gender identity.            Thank you!     Thank you for choosing [facility]  for your care. Our goal is always to provide you with excellent care. Hearing back from our patients is one way we can continue to improve our services. Please take a few minutes to complete the written survey that you may receive in the mail after your visit with us. Thank you!             Your Updated Medication List - Protect others around you: Learn how to safely use, store and throw away your medicines at www.disposemymeds.org.          This list is accurate as of 11/12/18 10:49 AM.  Always use your most recent med list.                   Brand Name Dispense Instructions for use Diagnosis    albuterol 108 (90 Base) MCG/ACT inhaler    PROAIR HFA/PROVENTIL HFA/VENTOLIN HFA    2 Inhaler    Inhale 2 puffs into the lungs every 4 hours as needed    Chest tightness       azelastine 0.1 % nasal spray    ASTELIN    1 Bottle    Spray 1 spray into both nostrils 2 times daily    Allergic rhinitis due to cat hair       fexofenadine 180 MG tablet    ALLEGRA    90 tablet    Take 1 tablet (180 mg) by mouth daily    Allergic rhinitis due to cat hair, Allergic rhinitis due to mold, Allergic rhinitis due to dust mite       fluticasone 50 MCG/ACT spray    FLONASE    16 mL    USE TWO SPRAYS IN EACH NOSTRIL DAILY    Chronic rhinitis       montelukast 10 MG tablet     SINGULAIR    30 tablet    Take 1 tablet (10 mg) by mouth At Bedtime    Seasonal allergic rhinitis due to other allergic trigger       order for DME     1 Device    AUTOPAP [id number]    SERENE (obstructive sleep apnea)       triamcinolone 0.1 % cream    KENALOG    453.6 g    Apply sparingly to affected area three times daily as needed    Venous stasis dermatitis of right lower extremity

## 2018-11-12 NOTE — NURSING NOTE
"Chief Complaint   Patient presents with     CPAP Follow Up       Initial There were no vitals taken for this visit. Estimated body mass index is 31.71 kg/(m^2) as calculated from the following:    Height as of 9/6/18: 1.6 m (5' 3\").    Weight as of 9/6/18: 81.2 kg (179 lb).    Medication Reconciliation: complete    "

## 2018-11-12 NOTE — NURSING NOTE
I Narrowed pressure to 8-15, and Decreased ramp to 15 minutes    Gisell Rae MA on 11/12/2018 at 11:08 AM

## 2018-11-12 NOTE — PROGRESS NOTES
Obstructive Sleep Apnea - PAP Follow-Up Visit:    Chief Complaint   Patient presents with     CPAP Follow Up       Kody Benton comes in today for follow-up of their mild sleep apnea, managed with CPAP.     Initial sleep study done on 8/15/18 at the [facility] for excessive daytime sleepiness (ESS 22), history of difficult to control appetite, probable inadequate total sleep time, minimal symptoms to suggest sleep disordered breathing.      Study Date: 8/15/2018 (175.0 lb)   -The combined apnea/hypopnea index was 12.9 events per hour (central apnea/hypopnea index was 0.3 events per hour). The REM AHI was 27.5 events per hour. The supine AHI was 14.4 events per hour. RDI was 17.1 events per hour.  -Lowest oxygen saturation was 76.6%. Time spent less than or equal to 88% was 3.9 minutes. Time spent less than or equal to 89% was 5.8 minutes.  - PLM index was 19.0 movements per hour. The PLM Arousal Index was 0.1 per hour.    Elected a trial of autoPAP 5-18 cmH20    Overall, he rates the experience with PAP as 10 (0 poor, 10 great). The mask is comfortable.    The mask is not leaking .  He is not snoring with the mask on. He is not having gasp arousals.  He is having significant oral/nasal dryness. The pressure is comfortable.     His PAP interface is Nasal Mask.  He was taking mask off without awareness, this improved with change to nasal mask from full-face mask 2 weeks ago    Compliance also has improved some    Bedtime is typically 0100. Usually it takes about 5 min minutes to fall asleep with the mask on. Wake time is typically 0630.  Patient is using PAP therapy 6.5 hours per night. The patient is usually getting 5 hours of sleep per night.    He does feel rested in the morning.    Total score - [place]: 19 (11/12/2018 10:00 AM)      PITER Total Score: 9    Respironics    Auto-PAP 5 - 15 cmH2O 30 day usage data:    36% of days with > 4 hours of use. 4/30 days with no use.   Average use 179  "minutes per day.   Average leak 52.09 LPM.  Average % of night in large leak 14%.    CPAP 90% pressure 8.5cm.   AHI 11.63 events per hour.  PB 1.3%  SAMUEL 0.5      Past medical/surgical history, family history, social history, medications and allergies were reviewed.      Problem List:  Patient Active Problem List    Diagnosis Date Noted     SERENE (obstructive sleep apnea)- mild (AHI 12) 08/20/2018     Priority: Medium     8/15/2018 [facility] (175.0 lbs) - AHI 12.9, RDI 17.1, Supine AHI 14.4, REM AHI 27.5, Low O2 76.6%, Time Spent ?88% 3.9 minutes / Time Spent ?89% 5.8 minutes.       Down's syndrome 10/08/2015     Priority: Medium     'mosaic'       Class 1 obesity due to excess calories without serious comorbidity with body mass index (BMI) of 32.0 to 32.9 in adult 06/25/2018     Priority: Low     Bilateral shoulder pain, unspecified chronicity 06/20/2018     Priority: Low     Chest tightness 08/07/2017     Priority: Low     Allergic rhinitis due to cat hair 08/07/2017     Priority: Low     Allergic rhinitis due to mold 08/07/2017     Priority: Low     Allergic rhinitis due to dust mite 08/07/2017     Priority: Low     Esophageal web determined by endoscopy 08/27/2016     Priority: Low     Gastritis, Helicobacter pylori 08/27/2016     Priority: Low     Advanced directives, counseling/discussion 10/08/2015     Priority: Low     Discussed advance care planning with patient; information given to patient to review. October 8, 2015 Sai Claros MA       Unilateral inguinal hernia 10/08/2015     Priority: Low        /66  Pulse 72  Ht 1.6 m (5' 3\")  Wt 81.6 kg (180 lb)  SpO2 98%  BMI 31.89 kg/m2    Impression/Plan:     Mild Sleep apnea. Tolerating PAP well. Poor compliance due to mask coming off without awareness. AHI elevated. Possible leak problems  -Practice with daytime naps  -Narrow pressure to 8-15  -Decrease ramp to 15 minutes  -Consider use of chin strap if high leaks " continue    Kody Benton will follow up in about 3 month(s).     Twenty-five minutes spent with patient, all of which were spent face-to-face counseling, consulting, coordinating plan of care.

## 2018-11-12 NOTE — PATIENT INSTRUCTIONS

## 2018-11-15 ENCOUNTER — TELEPHONE (OUTPATIENT)
Dept: ALLERGY | Facility: OTHER | Age: 58
End: 2018-11-15

## 2018-11-15 NOTE — TELEPHONE ENCOUNTER
RN contacted pharmacy to see if refills were needed or if a prior authorization was to be initiated. They state they need refills. Patient has not seen provider since 8/2017. Explained that he would need to see provider for further refills. They will pass this information on to the patient. Closing encounter.    Mojgan Carter RN

## 2018-12-21 DIAGNOSIS — J30.81 ALLERGIC RHINITIS DUE TO CAT HAIR: ICD-10-CM

## 2018-12-21 NOTE — TELEPHONE ENCOUNTER
"Last filled by allergist, request is for Dr Keane    Requested Prescriptions   Pending Prescriptions Disp Refills     azelastine (ASTELIN) 0.1 % nasal spray  Last Written Prescription Date:  08/07/17  Last Fill Quantity: 1,  # refills: 11   Last Office Visit with G, P or [facility] prescribing provider:  05/24/18-Diya   Future Office Visit:    1 Bottle 11     Sig: Spray 1 spray into both nostrils 2 times daily    Antihistamines Protocol Passed - 12/21/2018 12:20 PM       Passed - Patient is 3-64 years of age    Apply weight-based dosing for peds patients age 3 - 12 years of age.    Forward request to provider for patients under the age of 3 or over the age of 64.         Passed - Recent (12 mo) or future (30 days) visit within the authorizing provider's specialty    Patient had office visit in the last 12 months or has a visit in the next 30 days with authorizing provider or within the authorizing provider's specialty.  See \"Patient Info\" tab in inbasket, or \"Choose Columns\" in Meds & Orders section of the refill encounter.                "

## 2018-12-26 RX ORDER — AZELASTINE 1 MG/ML
1 SPRAY, METERED NASAL 2 TIMES DAILY
Qty: 1 BOTTLE | Refills: 3 | Status: SHIPPED | OUTPATIENT
Start: 2018-12-26 | End: 2019-03-12

## 2018-12-26 NOTE — TELEPHONE ENCOUNTER
Prescription approved per [facility] Refill Protocol.      Kia Lares RN, BSN

## 2019-01-10 ENCOUNTER — TELEPHONE (OUTPATIENT)
Dept: INTERNAL MEDICINE | Facility: CLINIC | Age: 59
End: 2019-01-10

## 2019-01-10 NOTE — TELEPHONE ENCOUNTER
Panel Management Review        Last Office Visit with this department:     Fail List measure:       Patient is due/failing the following:   COLONOSCOPY    Action needed:   none    Type of outreach:    Sent [employer] message.    Questions for provider review:    None                                                                                                                                    Jonas Hudson MA

## 2019-02-11 ENCOUNTER — OFFICE VISIT (OUTPATIENT)
Dept: SLEEP MEDICINE | Facility: CLINIC | Age: 59
End: 2019-02-11
Payer: COMMERCIAL

## 2019-02-11 VITALS
HEIGHT: 63 IN | DIASTOLIC BLOOD PRESSURE: 74 MMHG | SYSTOLIC BLOOD PRESSURE: 114 MMHG | OXYGEN SATURATION: 99 % | BODY MASS INDEX: 32.07 KG/M2 | HEART RATE: 64 BPM | WEIGHT: 181 LBS

## 2019-02-11 DIAGNOSIS — G47.33 OSA (OBSTRUCTIVE SLEEP APNEA): Primary | ICD-10-CM

## 2019-02-11 PROCEDURE — 99213 OFFICE O/P EST LOW 20 MIN: CPT | Performed by: INTERNAL MEDICINE

## 2019-02-11 ASSESSMENT — MIFFLIN-ST. JEOR: SCORE: 1531.14

## 2019-02-11 NOTE — PROGRESS NOTES
Obstructive Sleep Apnea - PAP Follow-Up Visit:    Chief Complaint   Patient presents with     CPAP Follow Up       Kody Benton comes in today for follow-up of their mild sleep apnea, managed with CPAP.     Initial sleep study done on 8/15/18 at the [facility] for excessive daytime sleepiness (ESS 22), history of difficult to control appetite, probable inadequate total sleep time, minimal symptoms to suggest sleep disordered breathing.      Study Date: 8/15/2018 (175.0 lb)   -The combined apnea/hypopnea index was 12.9 events per hour (central apnea/hypopnea index was 0.3 events per hour). The REM AHI was 27.5 events per hour. The supine AHI was 14.4 events per hour. RDI was 17.1 events per hour.  -Lowest oxygen saturation was 76.6%. Time spent less than or equal to 88% was 3.9 minutes. Time spent less than or equal to 89% was 5.8 minutes.  - PLM index was 19.0 movements per hour. The PLM Arousal Index was 0.1 per hour.    Elected a trial of autoPAP 5-18 cmH20    Overall, he rates the experience with PAP as 8 (0 poor, 10 great). The mask is comfortable.    The mask is not leaking .  He is not snoring with the mask on. He is not having gasp arousals.  He is having significant oral/nasal dryness. The pressure is comfortable.     His PAP interface is Nasal Mask.    Bedtime is typically 0000. Usually it takes about 20 min minutes to fall asleep with the mask on. Wake time is typically 0700.  Patient is using PAP therapy 6 hours per night. The patient is usually getting 7 hours of sleep per night.    He does feel rested in the morning.    Total score - [place]: 5 (2/11/2019 10:00 AM)  PITER Total Score: 11    His brother notes some oral leaking  His mask is staying on better      Respironics  Auto-PAP 8 - 15 cmH2O 30 day usage data:    93% of days with > 4 hours of use. 1/30 days with no use.   Average use 316 minutes per day.   Average leak 49.45 LPM.  Average % of night in large leak 9%.    CPAP  "90% pressure 11.45cm.   AHI 5.12 events per hour.        Past medical/surgical history, family history, social history, medications and allergies were reviewed.      Problem List:  Patient Active Problem List    Diagnosis Date Noted     SERENE (obstructive sleep apnea)- mild (AHI 12) 08/20/2018     Priority: Medium     8/15/2018 [facility] (175.0 lbs) - AHI 12.9, RDI 17.1, Supine AHI 14.4, REM AHI 27.5, Low O2 76.6%, Time Spent ?88% 3.9 minutes / Time Spent ?89% 5.8 minutes.       Down's syndrome 10/08/2015     Priority: Medium     'mosaic'       Class 1 obesity due to excess calories without serious comorbidity with body mass index (BMI) of 32.0 to 32.9 in adult 06/25/2018     Priority: Low     Bilateral shoulder pain, unspecified chronicity 06/20/2018     Priority: Low     Chest tightness 08/07/2017     Priority: Low     Allergic rhinitis due to cat hair 08/07/2017     Priority: Low     Allergic rhinitis due to mold 08/07/2017     Priority: Low     Allergic rhinitis due to dust mite 08/07/2017     Priority: Low     Esophageal web determined by endoscopy 08/27/2016     Priority: Low     Gastritis, Helicobacter pylori 08/27/2016     Priority: Low     Advanced directives, counseling/discussion 10/08/2015     Priority: Low     Discussed advance care planning with patient; information given to patient to review. October 8, 2015 Sai Claros MA       Unilateral inguinal hernia 10/08/2015     Priority: Low        /74   Pulse 64   Ht 1.6 m (5' 3\")   Wt 82.1 kg (181 lb)   SpO2 99%   BMI 32.06 kg/m      Impression/Plan:     Mild Sleep apnea. Tolerating PAP better.   Daytime symptoms are somewhat better, though brother is not sure   -Change pressures to 9-15 cmH20  -See DME about raciel     Kody Manjulachrista will follow up in about 1-2 year(s).     Fifteen minutes spent with patient, all of which were spent face-to-face counseling, consulting, coordinating plan of care.        "

## 2019-02-11 NOTE — NURSING NOTE
"Chief Complaint   Patient presents with     CPAP Follow Up       Initial /74   Pulse 64   Ht 1.6 m (5' 3\")   Wt 82.1 kg (181 lb)   SpO2 99%   BMI 32.06 kg/m   Estimated body mass index is 32.06 kg/m  as calculated from the following:    Height as of this encounter: 1.6 m (5' 3\").    Weight as of this encounter: 82.1 kg (181 lb).    Medication Reconciliation: complete      "

## 2019-03-12 ENCOUNTER — TELEPHONE (OUTPATIENT)
Dept: FAMILY MEDICINE | Facility: CLINIC | Age: 59
End: 2019-03-12

## 2019-03-12 DIAGNOSIS — J30.81 ALLERGIC RHINITIS DUE TO CAT HAIR: ICD-10-CM

## 2019-03-12 PROBLEM — R07.89 CHEST TIGHTNESS: Status: RESOLVED | Noted: 2017-08-07 | Resolved: 2019-03-12

## 2019-03-12 RX ORDER — AZELASTINE 1 MG/ML
1 SPRAY, METERED NASAL 2 TIMES DAILY
Qty: 1 BOTTLE | Refills: 11 | Status: SHIPPED | OUTPATIENT
Start: 2019-03-12 | End: 2023-05-16

## 2019-03-12 NOTE — TELEPHONE ENCOUNTER
Faxed message from pharmacy:    Patient would like azelastine (ASTELIN) 0.1 % nasal spray changed to Flonase.    Please send new Rx to [facility] if appropriate.

## 2019-03-19 ENCOUNTER — DOCUMENTATION ONLY (OUTPATIENT)
Dept: SLEEP MEDICINE | Facility: CLINIC | Age: 59
End: 2019-03-19

## 2019-03-19 DIAGNOSIS — G47.33 OSA (OBSTRUCTIVE SLEEP APNEA): ICD-10-CM

## 2019-03-19 NOTE — PROGRESS NOTES
6 Month [facility] visit    Diagnostic AHI: 12.9    PSG    Subjective measures:   Pt not feeling mask leak at this time.  AHI is dropping.  He switches back and forth between nasal and full face.  He feels that he has some mouth leak with the  Nasal mask.     Assessment: Pt not meeting objective benchmarks for AHI and leak Patient meeting subjective benchmarks.   Action plan:   pt to follow up per provider request    Device type: Auto-CPAP  PAP settings: CPAP min 9 cm  H20     CPAP max 15 cm  H20     90th % [id number] cm  H20    Objective measures: 14 day rolling measures         Compliance  71 %     % of night spent in large leak  14 % last  upload      AHI 6.35   last  upload      Average number of minutes 321           Objective measure goal  Compliance   Goal >70%  Leak   Goal < 10%  AHI  Goal < 5  Usage  Goal >240

## 2019-03-29 ENCOUNTER — TELEPHONE (OUTPATIENT)
Dept: FAMILY MEDICINE | Facility: CLINIC | Age: 59
End: 2019-03-29

## 2019-03-29 NOTE — LETTER
[address] [address]  [phone]  Dept: [phone]      March 29, 2019      Kody Benton  [address]  [facility] [address]    Dear Kody Benton,     At [facility] we care about your health and are committed to providing quality patient care.  Which includes staying current on preventive cancer screenings.  You can increase your chances of finding and treating cancers through regular screenings.    Our records indicate you may be due for the following preventive screening(s):    Colonoscopy    Colonoscopy is recommended every ten years for everyone age 50 and older. Please take a moment to read over the enclosed information packet about colon cancer screening. We strongly urge our patient's to consider having a colonoscopy done, which is the best screening test available and only needs to be done every 10 years if normal. If you are unwilling or unable to have a colonoscopy then we recommend the annual stool testing for blood. This test is called a FIT test and it looks for blood in the stool.     To schedule an appointment for your colonoscopy, please see the attached referral.   To schedule an appointment or discuss this screening further, you may contact us by phone at the [facility] at [phone] or online through the patient [email or web address]  If you have had any of the screenings listed above at another facility, please call us so that we may update your chart.      Your partners in health,    Quality Committee at [facility]

## 2019-03-29 NOTE — TELEPHONE ENCOUNTER
Panel Management Review   One phone call and send letter if unable to reach them or [employer] message and send letter if not read after 2 weeks (You will get a message to your inOfferboxxet)      3/12/2019    Fail List measure: Colonoscopy        Patient is due/failing the following:   COLONOSCOPY    Action needed:   Colonoscopy    Type of outreach:    Sent letter.    Questions for provider review:    None                                                                                                                                    Starr Edward MA      Chart routed to N/A .

## 2019-04-04 DIAGNOSIS — J31.0 CHRONIC RHINITIS: ICD-10-CM

## 2019-04-04 NOTE — TELEPHONE ENCOUNTER
Please clarify request with patient. Was given Astelin nasal spray on 3/12/19 for allergic rhinitis to cat hair, per Dr. Keane. Reason for change to Flonase?  Thank you.    Kim Aparicio RN

## 2019-04-04 NOTE — TELEPHONE ENCOUNTER
Called and spoke with patient. He states the astelin was prescribed for cat hair, but there are no more cats in the house and he does not need the astelin anymore. Would like Flonase send to [facility].    Kenia Sanabria MA

## 2019-04-05 RX ORDER — FLUTICASONE PROPIONATE 50 MCG
SPRAY, SUSPENSION (ML) NASAL
Qty: 16 ML | Refills: 4 | Status: SHIPPED | OUTPATIENT
Start: 2019-04-05 | End: 2021-02-25

## 2019-04-29 ENCOUNTER — OFFICE VISIT (OUTPATIENT)
Dept: URGENT CARE | Facility: URGENT CARE | Age: 59
End: 2019-04-29
Payer: COMMERCIAL

## 2019-04-29 VITALS
HEART RATE: 73 BPM | SYSTOLIC BLOOD PRESSURE: 115 MMHG | RESPIRATION RATE: 16 BRPM | DIASTOLIC BLOOD PRESSURE: 69 MMHG | TEMPERATURE: 98.3 F | OXYGEN SATURATION: 98 % | BODY MASS INDEX: 31.96 KG/M2 | WEIGHT: 180.4 LBS
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
## 2019-04-29 PROCEDURE — 99213 OFFICE O/P EST LOW 20 MIN: CPT | Mod: 25 | Performed by: PHYSICIAN ASSISTANT

## 2019-04-29 PROCEDURE — 90471 IMMUNIZATION ADMIN: CPT | Performed by: PHYSICIAN ASSISTANT

## 2019-04-29 PROCEDURE — 90715 TDAP VACCINE 7 YRS/> IM: CPT | Performed by: PHYSICIAN ASSISTANT

## 2019-04-29 ASSESSMENT — ENCOUNTER SYMPTOMS
HEADACHES: 0
CARDIOVASCULAR NEGATIVE: 1
COUGH: 0
PALPITATIONS: 0
DIZZINESS: 0
NAUSEA: 0
VOMITING: 0
MUSCULOSKELETAL NEGATIVE: 1
DIAPHORESIS: 0
MYALGIAS: 0
EYE REDNESS: 0
DIARRHEA: 0
WOUND: 1
HEMATURIA: 0
SORE THROAT: 0
POLYDIPSIA: 0
GASTROINTESTINAL NEGATIVE: 1
RHINORRHEA: 0
SHORTNESS OF BREATH: 0
RESPIRATORY NEGATIVE: 1
EYES NEGATIVE: 1
LIGHT-HEADEDNESS: 0
ENDOCRINE NEGATIVE: 1
ABDOMINAL PAIN: 0
FEVER: 0
NEUROLOGICAL NEGATIVE: 1
CHILLS: 0
CONSTITUTIONAL NEGATIVE: 1
EYE ITCHING: 0
DYSURIA: 0
EYE DISCHARGE: 0
WEAKNESS: 0
CHEST TIGHTNESS: 0
WHEEZING: 0
FREQUENCY: 0
ADENOPATHY: 0

## 2019-04-29 NOTE — PATIENT INSTRUCTIONS
Patient Education     Animal Bite (General)  An animal bite can cause a wound deep enough to break the skin. In such cases, the wound is cleaned and then sometimes closed. If the wound is closed, it may not be closed completely. This is so that fluid can drain if the wound becomes infected. In addition to wound care, a tetanus shot may be given, if needed.    Home care    Care for the wound as directed. If a dressing was applied to the wound, be sure to change it as directed.    Wash your hands well with soap and warm water before and after caring for the wound. This helps lower the risk of infection.    If the wound bleeds, place a clean, soft cloth on the wound. Then firmly apply pressure until the bleeding stops. This may take up to 5 minutes. Do not release the pressure and look at the wound during this time.    Most skin wounds heal within 10 days. But an infection can occur even with proper treatment. So be sure to watch the wound for signs of infection (see below). Check the wound as often as directed by your healthcare provider.    Antibiotics may be prescribed. These help prevent or treat infection. If you re given antibiotics, take them as directed. Also be sure to complete the medicines.  Rabies prevention  Rabies is a virus that can be carried in certain animals. These can include domestic animals such as dogs and cats. Wild animals such as skunks, raccoons, foxes, and bats can also carry rabies. Pets fully vaccinated against rabies (2 shots) are at very low risk of infection. But because human rabies is almost always fatal, any biting pet should be confined for 10 days as an extra precaution. In general, if there is a risk for rabies, the following steps may need to be taken:    If someone s pet dog or cat has bitten you, it should be kept in a secure area for the next 10 days to watch for signs of illness. (If the pet owner won t allow this, contact your local animal control center.) If the dog or  cat becomes ill or dies during that time, contact your local animal control center at once so the animal may be tested for rabies. If the pet stays healthy for the next 10 days, there is no danger of rabies in the animal or you.    If a stray pet bit you, contact your local animal control center. They can give information on capture, quarantine, and animal rabies testing.    If you can t find the animal that bit you in the next 2 days, and if rabies exists in your region, you may need to receive the rabies vaccine series. Call your healthcare provider right away. Or return to the emergency department promptly.    All animal bites should be reported to the local animal control center. If you were not given a form to fill out, you can report this yourself.  Follow-up care  Follow up with your healthcare provider, or as directed.  When to seek medical advice  Call your healthcare provider right away if any of these occur:    Signs of infection:  ? Spreading redness or warmth from the wound  ? Increased pain or swelling  ? Fever of 100.4 F (38 C) or higher, or as directed by your healthcare provider  ? Colored fluid or pus draining from the wound    Signs of rabies infection:  ? Headache  ? Confusion  ? Strange behavior  ? Increased salivating and drooling  ? Seizure    Decreased ability to move any body part near the bite area    Bleeding that can't be stopped after 5 minutes of firm pressure  Date Last Reviewed: 3/1/2017    9580-1773 [employer]. [address] [place] [address]096. All rights reserved. This information is not intended as a substitute for professional medical care. Always follow your healthcare professional's instructions.

## 2019-04-29 NOTE — PROGRESS NOTES
Chief Complaint:    Chief Complaint   Patient presents with     Dog Bite     Dog bite on chin happened around 10 am this morning. Brother in law's dog.        HPI: Kody Benton is an 58 year old male who presents for evaluation and treatment of dog bite of the face.  This happened roughly 6 hours go.  The dogs are known to him and up to date on shots.  He denies any numbness or tingling.  Patient washed the wound out with alcohol.      ROS:      Review of Systems   Constitutional: Negative.  Negative for chills, diaphoresis and fever.   HENT: Negative.  Negative for congestion, ear pain, rhinorrhea and sore throat.    Eyes: Negative.  Negative for discharge, redness and itching.   Respiratory: Negative.  Negative for cough, chest tightness, shortness of breath and wheezing.    Cardiovascular: Negative.  Negative for chest pain and palpitations.   Gastrointestinal: Negative.  Negative for abdominal pain, diarrhea, nausea and vomiting.   Endocrine: Negative.  Negative for polydipsia and polyuria.   Genitourinary: Negative for dysuria, frequency, hematuria and urgency.   Musculoskeletal: Negative.  Negative for myalgias.   Skin: Positive for wound. Negative for rash.   Allergic/Immunologic: Negative for immunocompromised state.   Neurological: Negative.  Negative for dizziness, weakness, light-headedness and headaches.   Hematological: Negative for adenopathy.        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    Social Needs     Financial resource strain: Not on file     Food insecurity:     Worry: Not on file     Inability: Not on file     Transportation needs:     Medical: Not on file     Non-medical: Not on file   Tobacco Use     Smoking status: Never Smoker     Smokeless tobacco: Never Used   Substance and Sexual Activity     Alcohol use: No     Comment: socially     Drug use: No     Sexual activity: Never   Lifestyle     Physical activity:     Days per week: Not on file     Minutes per session: Not on file     Stress: Not on file   Relationships     Social connections:     Talks on phone: Not on file     Gets together: Not on file     Attends Zoroastrianism service: Not on file     Active member of club or organization: Not on file     Attends meetings of clubs or organizations: Not on file     Relationship status: Not on file     Intimate partner violence:     Fear of current or ex partner: Not on file     Emotionally abused: Not on file     Physically abused: Not on file     Forced sexual activity: Not on file   Other Topics Concern     Parent/sibling w/ CABG, MI or angioplasty before 65F 55M? Not Asked   Social History Narrative     Not on file        Surgical History:  Past Surgical History:   Procedure Laterality Date     NO HISTORY OF SURGERY          Problem List:  Patient Active Problem List   Diagnosis     Advanced directives, counseling/discussion     Down's syndrome     Unilateral inguinal hernia     Esophageal web determined by endoscopy     Gastritis, Helicobacter pylori     Allergic rhinitis due to cat hair     Allergic rhinitis due to mold     Allergic rhinitis due to dust mite     Bilateral shoulder pain, unspecified chronicity     Class 1 obesity due to excess calories without serious comorbidity with body mass index (BMI) of 32.0 to 32.9 in adult     SERENE (obstructive sleep apnea)- mild (AHI 12)        Allergies:  No Known Allergies     Current Meds:    Current Outpatient Medications:      albuterol  (PROAIR HFA/PROVENTIL HFA/VENTOLIN HFA) 108 (90 BASE) MCG/ACT Inhaler, Inhale 2 puffs into the lungs every 4 hours as needed, Disp: 2 Inhaler, Rfl: 3     amoxicillin-clavulanate (AUGMENTIN) 875-125 MG tablet, Take 1 tablet by mouth 2 times daily for 7 days, Disp: 14 tablet, Rfl: 0     azelastine (ASTELIN) 0.1 % nasal spray, Spray 1 spray into both nostrils 2 times daily, Disp: 1 Bottle, Rfl: 11     fexofenadine (ALLEGRA) 180 MG tablet, Take 1 tablet (180 mg) by mouth daily, Disp: 90 tablet, Rfl: 3     fluticasone (FLONASE) 50 MCG/ACT nasal spray, USE TWO SPRAYS IN EACH NOSTRIL DAILY, Disp: 16 mL, Rfl: 4     order for DME, AUTOPAP [id number], Disp: 1 Device, Rfl: 0     triamcinolone (KENALOG) 0.1 % cream, Apply sparingly to affected area three times daily as needed, Disp: 453.6 g, Rfl: 5     PHYSICAL EXAM:     Vital signs noted and reviewed by Donald Altman  /69   Pulse 73   Temp 98.3  F (36.8  C) (Oral)   Resp 16   Wt 81.8 kg (180 lb 6.4 oz)   SpO2 98%   BMI 31.96 kg/m       PEFR:    Physical Exam   Constitutional: He appears well-developed and well-nourished. He is cooperative.  Non-toxic appearance. He does not have a sickly appearance. He does not appear ill. No distress.   HENT:   Head: Normocephalic and atraumatic.   Right Ear: Hearing, tympanic membrane, external ear and ear canal normal. Tympanic membrane is not perforated, not erythematous, not retracted and not bulging.   Left Ear: Hearing, tympanic membrane, external ear and ear canal normal. Tympanic membrane is not perforated, not erythematous, not retracted and not bulging.   Nose: Nose normal. No mucosal edema or rhinorrhea.   Mouth/Throat: Oropharynx is clear and moist and mucous membranes are normal. No oropharyngeal exudate, posterior oropharyngeal edema, posterior oropharyngeal erythema or tonsillar abscesses. Tonsils are 0 on the right. Tonsils are 0 on the left. No tonsillar exudate.   Eyes: Pupils are equal, round, and reactive to  light. EOM are normal.   Neck: Normal range of motion. Neck supple.   Cardiovascular: Normal rate, regular rhythm, S1 normal, S2 normal, normal heart sounds and intact distal pulses. Exam reveals no gallop, no distant heart sounds and no friction rub.   No murmur heard.  Pulmonary/Chest: Effort normal and breath sounds normal. No respiratory distress. He has no decreased breath sounds. He has no wheezes. He has no rhonchi. He has no rales.   Abdominal: Soft. Bowel sounds are normal. He exhibits no distension. There is no tenderness.   Lymphadenopathy:     He has no cervical adenopathy.   Neurological: He is alert. No cranial nerve deficit.   Skin: Skin is warm and dry. No rash noted. He is not diaphoretic.   Superficial excoriations on the R side of the chin.     Psychiatric: He has a normal mood and affect. His speech is normal and behavior is normal. Judgment and thought content normal. Cognition and memory are normal. He is attentive.   Nursing note and vitals reviewed.       Labs:       Medical Decision Making:    Differential Diagnosis:  Dog Bite     ASSESSMENT:     1. Dog bite, initial encounter         PLAN:     Patient given tetanus booster in clinic today.  Rx for Augmentin.  Discussed signs and symptoms of infection with patient and caregiver with instructions to return.  Worrisome symptoms discussed with instructions to go to the ED.  Patient and caregiver verbalized understanding and agreed with this plan.     Donald Altman  4/29/2019, 4:45 PM

## 2019-08-05 DIAGNOSIS — R07.89 CHEST TIGHTNESS: ICD-10-CM

## 2019-08-05 RX ORDER — ALBUTEROL SULFATE 90 UG/1
2 AEROSOL, METERED RESPIRATORY (INHALATION) EVERY 4 HOURS PRN
Qty: 2 INHALER | Refills: 3 | Status: CANCELLED | OUTPATIENT
Start: 2019-08-05

## 2019-09-13 ENCOUNTER — ALLIED HEALTH/NURSE VISIT (OUTPATIENT)
Dept: NURSING | Facility: CLINIC | Age: 59
End: 2019-09-13
Payer: COMMERCIAL

## 2019-09-13 DIAGNOSIS — Z23 NEED FOR PROPHYLACTIC VACCINATION AND INOCULATION AGAINST INFLUENZA: Primary | ICD-10-CM

## 2019-09-13 PROCEDURE — 99207 ZZC NO CHARGE NURSE ONLY: CPT

## 2019-09-13 PROCEDURE — 90471 IMMUNIZATION ADMIN: CPT

## 2019-09-13 PROCEDURE — 90686 IIV4 VACC NO PRSV 0.5 ML IM: CPT

## 2019-09-25 DIAGNOSIS — R07.89 CHEST TIGHTNESS: ICD-10-CM

## 2019-09-25 RX ORDER — ALBUTEROL SULFATE 90 UG/1
2 AEROSOL, METERED RESPIRATORY (INHALATION) EVERY 4 HOURS PRN
Qty: 2 INHALER | Refills: 3 | Status: CANCELLED | OUTPATIENT
Start: 2019-09-25

## 2019-10-23 DIAGNOSIS — R07.89 CHEST TIGHTNESS: ICD-10-CM

## 2019-10-23 NOTE — TELEPHONE ENCOUNTER
"Requested Prescriptions   Pending Prescriptions Disp Refills     albuterol (PROAIR HFA/PROVENTIL HFA/VENTOLIN HFA) 108 (90 Base) MCG/ACT inhaler  Last Written Prescription Date:  08/07/17  Last Fill Quantity: 2,  # refills: 3   [facility] Visit with [facility] or [facility] prescribing provider:  05/24/18-Vocal   Future Office Visit:    2 Inhaler 3     Sig: Inhale 2 puffs into the lungs every 4 hours as needed       Asthma Maintenance Inhalers - Anticholinergics Failed - 10/23/2019  9:18 AM        Failed - Recent (12 mo) or future (30 days) visit within the authorizing provider's specialty     Patient has had an office visit with the authorizing provider or a provider within the authorizing providers department within the previous 12 mos or has a future within next 30 days. See \"Patient Info\" tab in inbasket, or \"Choose Columns\" in Meds & Orders section of the refill encounter.              Passed - Patient is age 12 years or older        Passed - Medication is active on med list          "

## 2019-10-25 RX ORDER — ALBUTEROL SULFATE 90 UG/1
2 AEROSOL, METERED RESPIRATORY (INHALATION) EVERY 4 HOURS PRN
Qty: 2 INHALER | Refills: 5 | Status: SHIPPED | OUTPATIENT
Start: 2019-10-25 | End: 2023-05-16

## 2019-10-25 NOTE — TELEPHONE ENCOUNTER
Routing refill request to provider for review/approval because:  Previous Rx from 2017 and prescribed by provider not from clinic  Елена Vogt RN  [facility]

## 2019-11-27 ENCOUNTER — TELEPHONE (OUTPATIENT)
Dept: ALLERGY | Facility: OTHER | Age: 59
End: 2019-11-27

## 2019-11-27 NOTE — TELEPHONE ENCOUNTER
Reason for Call:  Medication or medication refill:    Do you use a [facility]?  Name of the pharmacy and phone number for the current request:  [facility] [place] - [phone]    Name of the medication requested: Allegra    Other request: Has been out for 3 weeks    Can we leave a detailed message on this number? YES    Phone number patient can be reached at: Home number on file [phone] (home)    Best Time:     Call taken on 11/27/2019 at 3:41 PM by Omaira Dillon

## 2019-12-02 NOTE — TELEPHONE ENCOUNTER
Spoke with patient. PA has been requested. Please see TE from 10/30/2019 with Dr. Keane.    Charlotte Orta MA

## 2019-12-04 ENCOUNTER — TELEPHONE (OUTPATIENT)
Dept: FAMILY MEDICINE | Facility: CLINIC | Age: 59
End: 2019-12-04

## 2019-12-04 ENCOUNTER — MYC REFILL (OUTPATIENT)
Dept: FAMILY MEDICINE | Facility: CLINIC | Age: 59
End: 2019-12-04

## 2019-12-04 DIAGNOSIS — J30.89 ALLERGIC RHINITIS DUE TO MOLD: ICD-10-CM

## 2019-12-04 DIAGNOSIS — J30.81 ALLERGIC RHINITIS DUE TO CAT HAIR: ICD-10-CM

## 2019-12-04 DIAGNOSIS — J30.89 ALLERGIC RHINITIS DUE TO DUST MITE: ICD-10-CM

## 2019-12-04 RX ORDER — FEXOFENADINE HCL 180 MG/1
180 TABLET ORAL DAILY
Qty: 90 TABLET | Refills: 3 | Status: CANCELLED | OUTPATIENT
Start: 2019-12-04

## 2019-12-04 NOTE — TELEPHONE ENCOUNTER
Reason for Call:  Medication or medication refill:    Do you use a [facility]?  Name of the pharmacy and phone number for the current request:  [id number] [address] [facility]     Name of the medication requested: Pending Prescriptions:                       Disp   Refills    fexofenadine (ALLEGRA) 180 MG tablet      90 tab*3            Sig: Take 1 tablet (180 mg) by mouth daily    Other request: Fax to [facility] [phone]    Can we leave a detailed message on this number? YES    Phone number patient can be reached at: Home number on file [phone] (home)    Best Time: Please call when approved has been out of for weeks. Sending High Priority Message. Looks like this one has been worked on before and did not get resolved.    Call taken on 12/4/2019 at 2:07 PM by Jo Perez

## 2019-12-05 ENCOUNTER — MYC REFILL (OUTPATIENT)
Dept: FAMILY MEDICINE | Facility: CLINIC | Age: 59
End: 2019-12-05

## 2019-12-05 ENCOUNTER — MYC MEDICAL ADVICE (OUTPATIENT)
Dept: FAMILY MEDICINE | Facility: CLINIC | Age: 59
End: 2019-12-05

## 2019-12-05 DIAGNOSIS — J30.81 ALLERGIC RHINITIS DUE TO CAT HAIR: ICD-10-CM

## 2019-12-05 DIAGNOSIS — J30.89 ALLERGIC RHINITIS DUE TO DUST MITE: ICD-10-CM

## 2019-12-05 DIAGNOSIS — J30.89 ALLERGIC RHINITIS DUE TO MOLD: ICD-10-CM

## 2019-12-05 RX ORDER — FEXOFENADINE HCL 180 MG/1
180 TABLET ORAL DAILY
Qty: 90 TABLET | Refills: 3 | OUTPATIENT
Start: 2019-12-05

## 2019-12-05 NOTE — TELEPHONE ENCOUNTER
Colin returned call    Best number to reach caller: Home number on file [phone] (home)    Is it ok to leave a detailed message: YES   Bhargav has been out for at least a month. Please call and let him know as soon as Prior Authorization is done. He really needs his medication as soon as possible.  Thank you

## 2019-12-05 NOTE — TELEPHONE ENCOUNTER
Central Prior Authorization Team   Phone: [phone]      PA Initiation    Medication: allegra-Initiated  Insurance Company: [employer] - Phone [phone] Fax [phone]  Pharmacy Filling the Rx: [id number] - [place], [place] - [address] [facility]  Filling Pharmacy Phone: [phone]  Filling Pharmacy Fax:    Start Date: 12/5/2019

## 2019-12-05 NOTE — TELEPHONE ENCOUNTER
Prior Authorization Approval    Authorization Effective Date: 12/5/2019  Authorization Expiration Date: 12/4/2020  Medication: allegra-APPROVED  Approved Dose/Quantity:   Reference #:     Insurance Company: [employer] - Phone [phone] Fax [phone]  Expected CoPay:       CoPay Card Available:      Foundation Assistance Needed:    Which Pharmacy is filling the prescription (Not needed for infusion/clinic administered): [id number] [address] [place] [address]  Pharmacy Notified: Yes  Patient Notified: No    Pharmacy will notify patient when medication is ready.  Received call from Margoth at [employer] that PA was approved.

## 2019-12-06 RX ORDER — FEXOFENADINE HCL 180 MG/1
180 TABLET ORAL DAILY
Qty: 90 TABLET | Refills: 3 | OUTPATIENT
Start: 2019-12-06

## 2020-01-17 ENCOUNTER — DOCUMENTATION ONLY (OUTPATIENT)
Dept: SLEEP MEDICINE | Facility: CLINIC | Age: 60
End: 2020-01-17

## 2020-01-17 DIAGNOSIS — G47.33 OSA (OBSTRUCTIVE SLEEP APNEA): Primary | Chronic | ICD-10-CM

## 2020-01-17 DIAGNOSIS — G47.33 OSA (OBSTRUCTIVE SLEEP APNEA): ICD-10-CM

## 2020-02-23 ENCOUNTER — HEALTH MAINTENANCE LETTER (OUTPATIENT)
Age: 60
End: 2020-02-23

## 2020-03-12 NOTE — PROGRESS NOTES
Paged shortly after 10AM that patient was diaphoretic and feeling unwell. Assessed the patient who was noted to be pale and sweaty with depressed mental status. Initial vital signs were: HR 60s, SBP 150s, RR 10-12, O2 sat 100% on room air.  She responded to questions and was AOx3. She followed commands slowly and required multiple prompts. On neuro exam, the patient was able to lift all extremities off of the bed, handgrip was strong and cranial nerves were grossly intact. Pupils were equal and round, though small and became less responsive on re-examination. Finger stick was 60 and the patient was given 1 cup of juice and 1 amp of D50. Repeat FS was 260s with little to no improvement in mental status. The patient was given Narcan 0.4 mg IVP x1 with rapid improvement in mental status. The patient subsequently became agitated and lab draws were delayed due to the patient flailing her arms.     Repeat Vitals:  T 98.6 F (rectal)  HR 65  /69  RR 18  O2 97% on room air    Labs:    VBG: pH 7.35, pO2 48, pCO2 47, HCO3 28                        10.7   11.94 )-----------( 201             34.1     141  |  100  |  41<H>  ----------------------------<  145<H>  3.3<L>   |  27  |  2.47<H>    Lactate, Blood: 2.0 mmol/L (03-12 @ 13:04)    A&P  Patient with CKD4 developed acute toxic-metabolic encephalopathy due to hypoglycemia and pain medication. Symptoms improved s/p D50 and Narcan. Unlikely infectious given afebrile, CT head negative for acute pathology and no focal findings are neuro exam, no electrolyte abnormalities. Unclear why patient had this response after receiving these same medications the day prior.  - discontinued gabapentin, percocet and methadone  - pain management consulted  - close observation  - decreased insulin regimen, endocrine consulted Patient came to [place] with brother-in-law who was getting a replacement CPAP on January 17, 2020.  Patient tried on the same mask he was receiving.  Patient requested to switch masks because general discomfort on nose from mask. Patient tried on the and selected a Resmed Mask name: Airfit F30 Full Face mask size Medium.  Patient also received heated tubing, reusable filter, disposable filters and water chamber today.

## 2020-10-31 ENCOUNTER — IMMUNIZATION (OUTPATIENT)
Dept: NURSING | Facility: CLINIC | Age: 60
End: 2020-10-31
Payer: COMMERCIAL

## 2020-10-31 DIAGNOSIS — Z23 NEED FOR PROPHYLACTIC VACCINATION AND INOCULATION AGAINST INFLUENZA: Primary | ICD-10-CM

## 2020-10-31 PROCEDURE — 99207 PR NO CHARGE NURSE ONLY: CPT

## 2020-10-31 PROCEDURE — 90682 RIV4 VACC RECOMBINANT DNA IM: CPT

## 2020-10-31 PROCEDURE — 90471 IMMUNIZATION ADMIN: CPT

## 2020-11-18 ENCOUNTER — TELEPHONE (OUTPATIENT)
Dept: SLEEP MEDICINE | Facility: CLINIC | Age: 60
End: 2020-11-18

## 2020-11-18 DIAGNOSIS — G47.33 OSA (OBSTRUCTIVE SLEEP APNEA): Primary | Chronic | ICD-10-CM

## 2020-11-18 NOTE — TELEPHONE ENCOUNTER
Reason for Call: Request for an order or referral:    Order or referral being requested: supplies, mask-[id number], Disposable filter-[id number],  Heating Tub-[id number].     Date needed: as soon as possible    Has the patient been seen by the PCP for this problem? YES    Additional comments: patient is calling for supplies     Phone number Patient can be reached at:  Cell number on file:    Telephone Information:   Mobile [phone]       Best Time:  Anytime     Can we leave a detailed message on this number?  YES    Call taken on 11/18/2020 at 2:13 PM by Veronica Simon

## 2021-02-16 ENCOUNTER — VIRTUAL VISIT (OUTPATIENT)
Dept: FAMILY MEDICINE | Facility: CLINIC | Age: 61
End: 2021-02-16
Payer: COMMERCIAL

## 2021-02-16 VITALS — WEIGHT: 165 LBS | HEIGHT: 63 IN | BODY MASS INDEX: 29.23 KG/M2

## 2021-02-16 DIAGNOSIS — J30.2 CHRONIC SEASONAL ALLERGIC RHINITIS: Primary | ICD-10-CM

## 2021-02-16 DIAGNOSIS — E55.9 VITAMIN D INSUFFICIENCY: ICD-10-CM

## 2021-02-16 DIAGNOSIS — M65.949 TENOSYNOVITIS OF HAND: ICD-10-CM

## 2021-02-16 PROCEDURE — 99214 OFFICE O/P EST MOD 30 MIN: CPT | Mod: 95 | Performed by: INTERNAL MEDICINE

## 2021-02-16 RX ORDER — FEXOFENADINE HCL 180 MG/1
180 TABLET ORAL DAILY
Qty: 90 TABLET | Refills: 3 | Status: SHIPPED | OUTPATIENT
Start: 2021-02-16 | End: 2022-04-08

## 2021-02-16 RX ORDER — OXYMETAZOLINE HYDROCHLORIDE 0.05 G/100ML
2 SPRAY NASAL 2 TIMES DAILY
Qty: 30 ML | Refills: 2 | Status: SHIPPED | OUTPATIENT
Start: 2021-02-16 | End: 2021-02-21

## 2021-02-16 ASSESSMENT — MIFFLIN-ST. JEOR: SCORE: 1448.57

## 2021-02-16 NOTE — PROGRESS NOTES
Kody is a 60 year old who is being evaluated via a billable video visit.      How would you like to obtain your AVS? MyChart  If the video visit is dropped, the invitation should be resent by: Text to cell phone: [phone]  Will anyone else be joining your video visit? No    Subjective   Kody is a 60 year old who presents for the following health issues     Allergies  Onset/Duration: 30 years   Symptoms:   Nasal congestion: no  Sneezing: YES  Red, itchy eyes: no  Progression of Symptoms: same constant  Accompanying Signs & Symptoms:  Cough: no  Wheezing: YES, asthma   Rash: no  Sinus/facial pain: no  History:   Is it seasonal: in the fall and year round    History of Asthma: YES  Has allergy testing been done: YES, at [facility]   Precipitating factors:   None  Alleviating factors:  Sinex severe   Therapies tried and outcome: Sinex severe (Oxymetazoline).         Review of Systems   CONSTITUTIONAL: NEGATIVE for fever, chills, change in weight  INTEGUMENTARY/SKIN: NEGATIVE for worrisome rashes, moles or lesions  EYES: NEGATIVE for vision changes or irritation  ENT/MOUTH: NEGATIVE for ear, mouth and throat problems  RESP: NEGATIVE for significant cough or SOB  CV: NEGATIVE for chest pain, palpitations or peripheral edema  GI: NEGATIVE for nausea, abdominal pain, heartburn, or change in bowel habits  : NEGATIVE for frequency, dysuria, or hematuria  MUSCULOSKELETAL: As above.  NEURO: NEGATIVE for weakness, dizziness or paresthesias  ENDOCRINE: NEGATIVE for temperature intolerance, skin/hair changes  HEME: NEGATIVE for bleeding problems  PSYCHIATRIC: NEGATIVE for changes in mood or affect      Objective      Self-reported vitals  XJ=522/72  HR=64 SpO2= 98% at room air Temp= 98.4 degrees Fahrenheit.        Video-Visit Details    Type of service:  Video Visit    Start: 02/16/2021 04:51 pm   Stop: 02/16/2021 05:11 pm     Originating Location (pt. Location): Home    Distant Location (provider location):  [facility]     Platform used for Video Visit: Julienne

## 2021-02-25 ENCOUNTER — TELEPHONE (OUTPATIENT)
Dept: FAMILY MEDICINE | Facility: CLINIC | Age: 61
End: 2021-02-25

## 2021-02-25 DIAGNOSIS — J31.0 CHRONIC RHINITIS: ICD-10-CM

## 2021-02-25 RX ORDER — FLUTICASONE PROPIONATE 50 MCG
SPRAY, SUSPENSION (ML) NASAL
Qty: 16 ML | Refills: 4 | Status: SHIPPED | OUTPATIENT
Start: 2021-02-25 | End: 2022-04-13

## 2021-02-25 NOTE — TELEPHONE ENCOUNTER
Inform patient, via brother-in-law, Ariel Cobb, that Voltaren 1% is only available as over-the-counter.  Patient should purchase it over-the-counter as it will not be covered as a prescription.    Rx for Vitamin D3 sent.    Otherwise, [employer] message sent.

## 2021-02-25 NOTE — TELEPHONE ENCOUNTER
Reason for call: Patient representative calling for the patient and at the Virtual visit was to receive a Rx for Voltaran and Vit D3 to send to [facility] in [place]    Can we leave a detailed message: Yes     Ariel salmeron  can be reached at: [phone]    Call taken at 12:32 pm on 2/25/20211    Maryam Clayton [facility]  2nd Floor  Primary Care

## 2021-03-16 ENCOUNTER — IMMUNIZATION (OUTPATIENT)
Dept: PEDIATRICS | Facility: CLINIC | Age: 61
End: 2021-03-16
Payer: COMMERCIAL

## 2021-03-16 PROCEDURE — 0001A PR COVID VAC PFIZER DIL RECON 30 MCG/0.3 ML IM: CPT

## 2021-03-16 PROCEDURE — 91300 PR COVID VAC PFIZER DIL RECON 30 MCG/0.3 ML IM: CPT

## 2021-04-06 ENCOUNTER — IMMUNIZATION (OUTPATIENT)
Dept: PEDIATRICS | Facility: CLINIC | Age: 61
End: 2021-04-06
Attending: INTERNAL MEDICINE
Payer: COMMERCIAL

## 2021-04-06 PROCEDURE — 0002A PR COVID VAC PFIZER DIL RECON 30 MCG/0.3 ML IM: CPT

## 2021-04-06 PROCEDURE — 91300 PR COVID VAC PFIZER DIL RECON 30 MCG/0.3 ML IM: CPT

## 2021-04-11 ENCOUNTER — HEALTH MAINTENANCE LETTER (OUTPATIENT)
Age: 61
End: 2021-04-11

## 2021-06-25 NOTE — TELEPHONE ENCOUNTER
fluticasone (FLONASE) 50 MCG/ACT nasal spray       Last Written Prescription Date: 11/8/16  Last Fill Quantity: 16 ml, # refills: 2    Last Office Visit with G, P or [facility] prescribing provider:  8/24/16   Future Office Visit:       Date of Last Asthma Action Plan Letter:   There are no preventive care reminders to display for this patient.   Asthma Control Test: No flowsheet data found.    Date of Last Spirometry Test:   No results found for this or any previous visit.            Neptali Faarax  [facility]     No

## 2021-09-26 ENCOUNTER — HEALTH MAINTENANCE LETTER (OUTPATIENT)
Age: 61
End: 2021-09-26

## 2021-10-12 ENCOUNTER — IMMUNIZATION (OUTPATIENT)
Dept: NURSING | Facility: CLINIC | Age: 61
End: 2021-10-12
Payer: COMMERCIAL

## 2021-10-12 PROCEDURE — 91300 PR COVID VAC PFIZER DIL RECON 30 MCG/0.3 ML IM: CPT

## 2021-10-12 PROCEDURE — 0003A PR COVID VAC PFIZER DIL RECON 30 MCG/0.3 ML IM: CPT

## 2022-04-10 ENCOUNTER — TELEPHONE (OUTPATIENT)
Dept: FAMILY MEDICINE | Facility: CLINIC | Age: 62
End: 2022-04-10
Payer: COMMERCIAL

## 2022-04-10 DIAGNOSIS — J30.89 SEASONAL ALLERGIC RHINITIS DUE TO OTHER ALLERGIC TRIGGER: Primary | ICD-10-CM

## 2022-04-10 NOTE — TELEPHONE ENCOUNTER
fexofenadine (ALLEGRA) 180 MG tablet 90 tablet 0 4/8/2022     PA is needed for medication. Please go to [email or web address]    KEY:[id number]

## 2022-04-12 DIAGNOSIS — J31.0 CHRONIC RHINITIS: ICD-10-CM

## 2022-04-12 NOTE — TELEPHONE ENCOUNTER
Patient calling for a refill on fluticasone (FLONASE) 50 MCG/ACT nasal spray to be sent to [facility] pharmacy.  Lenora Baker  [facility]  2nd Floor  Primary Care

## 2022-04-13 RX ORDER — FLUTICASONE PROPIONATE 50 MCG
SPRAY, SUSPENSION (ML) NASAL
Qty: 16 ML | Refills: 0 | Status: SHIPPED | OUTPATIENT
Start: 2022-04-13 | End: 2023-05-16

## 2022-04-13 NOTE — TELEPHONE ENCOUNTER
Per Camacho at [facility], Fexofenadine is not covered it is completely excluded from plan.  Patient will either need to pay out of pocket for medication or use a preferred product from list below:

## 2022-04-14 ENCOUNTER — MYC MEDICAL ADVICE (OUTPATIENT)
Dept: FAMILY MEDICINE | Facility: CLINIC | Age: 62
End: 2022-04-14
Payer: COMMERCIAL

## 2022-04-14 NOTE — TELEPHONE ENCOUNTER
Patient and brother in law are calling on status of this medication. Patient states he has been out of this med forr 2 weeks now. Please advise.  Lenora Baker  [facility]  2nd Floor  Primary Care

## 2022-04-14 NOTE — TELEPHONE ENCOUNTER
Refer to mychart refill encounter from today.     Pt read message from RN.    Waiting on provider to address refill.      Loreto Gautam RN  [facility]

## 2022-04-14 NOTE — TELEPHONE ENCOUNTER
Patient and brother-in-law calling to find out status of this medication. Patient is miserable and has not had this medication in 2 weeks.  Lenora Baker  [facility]  2nd Floor  Primary Care

## 2022-04-15 RX ORDER — LORATADINE 10 MG/1
10 TABLET ORAL DAILY
Qty: 30 TABLET | Refills: 5 | Status: SHIPPED | OUTPATIENT
Start: 2022-04-15 | End: 2023-05-16

## 2022-04-15 NOTE — TELEPHONE ENCOUNTER
Brother calling stating that he just got off the phone with [facility] and they state that the Fexofenadine is actually covered and to resubmit that Rx to [facility] in [place].  Maryam Clayton [facility]  2nd Floor  Primary Care

## 2022-04-15 NOTE — TELEPHONE ENCOUNTER
I informed both patient and his brother-in-law to try Loratadine instead of sending a letter of appeal to include Fexofenadine in his benefit plan since Mr. Benton has not even tried any of the alternatives.    Patient agrees. Loratadine sent.

## 2022-04-19 NOTE — TELEPHONE ENCOUNTER
Outpatient Medication Detail     Disp Refills Start End MEGAN   fluticasone (FLONASE) 50 MCG/ACT nasal spray 16 mL 0 4/13/2022  No   Sig: USE TWO SPRAYS IN EACH NOSTRIL DAILY   Sent to pharmacy as: Fluticasone Propionate 50 MCG/ACT Nasal Suspension (FLONASE)   Class: E-Prescribe   Notes to Pharmacy: Patient needs provider appointment for more refills   Order: [id number]   E-Prescribing Status: Receipt confirmed by pharmacy (4/13/2022  1:28 PM CDT)

## 2022-04-20 ENCOUNTER — IMMUNIZATION (OUTPATIENT)
Dept: NURSING | Facility: CLINIC | Age: 62
End: 2022-04-20
Payer: COMMERCIAL

## 2022-04-20 PROCEDURE — 91305 COVID-19,PF,PFIZER (12+ YRS): CPT

## 2022-04-20 PROCEDURE — 0054A COVID-19,PF,PFIZER (12+ YRS): CPT

## 2022-04-26 ENCOUNTER — NURSE TRIAGE (OUTPATIENT)
Dept: FAMILY MEDICINE | Facility: CLINIC | Age: 62
End: 2022-04-26
Payer: COMMERCIAL

## 2022-04-26 NOTE — TELEPHONE ENCOUNTER
"Patient's brother Colin called on behalf of this patient, patient is on speaker phone and able to describe how he has been feeling.      The patient has a known inguinal hernia that was checked out and advised to watch for strangulation or does not go back in.  The patient has had this for about 14 years.  Lately in the past year, it has \"been acting up\" and he has been able to massage it back into place.    About a day or two they had fresh raw veggies, broccoli, carrots, celery.  Today the patient had diarrhea and so much pain that he was in tears .   The patient showed his brother, Colin and Colin was shocked how large it was. It was about the size of a baseball and hard.      Due to severe pain and hernia not able to work back in, advised ED now.  They will go to the [facility] ED. They both agree to plan.    Reason for Disposition    Inguinal hernia previously diagnosed by a physician    SEVERE abdominal pain    Additional Information    Negative: Sounds like a life-threatening emergency to the triager    Negative: [1] Swelling of scrotum AND [2] has not previously been diagnosed with a hernia    Protocols used: SKIN LUMP OR LOCALIZED SWELLING-A-AH, HERNIA-A-AH    Yasmine Lance RN, [facility]    "

## 2022-05-07 ENCOUNTER — HEALTH MAINTENANCE LETTER (OUTPATIENT)
Age: 62
End: 2022-05-07

## 2022-11-03 ENCOUNTER — IMMUNIZATION (OUTPATIENT)
Dept: FAMILY MEDICINE | Facility: CLINIC | Age: 62
End: 2022-11-03
Payer: COMMERCIAL

## 2022-11-03 DIAGNOSIS — Z23 NEED FOR PROPHYLACTIC VACCINATION AND INOCULATION AGAINST INFLUENZA: Primary | ICD-10-CM

## 2022-11-03 PROCEDURE — 90471 IMMUNIZATION ADMIN: CPT

## 2022-11-03 PROCEDURE — 90682 RIV4 VACC RECOMBINANT DNA IM: CPT

## 2022-11-03 PROCEDURE — 99207 PR NO CHARGE NURSE ONLY: CPT

## 2023-05-12 ENCOUNTER — MYC MEDICAL ADVICE (OUTPATIENT)
Dept: FAMILY MEDICINE | Facility: CLINIC | Age: 63
End: 2023-05-12
Payer: COMMERCIAL

## 2023-05-16 ENCOUNTER — VIRTUAL VISIT (OUTPATIENT)
Dept: FAMILY MEDICINE | Facility: CLINIC | Age: 63
End: 2023-05-16
Payer: COMMERCIAL

## 2023-05-16 DIAGNOSIS — A04.8 BACTERIAL INFECTION DUE TO H. PYLORI: Primary | ICD-10-CM

## 2023-05-16 DIAGNOSIS — J30.2 CHRONIC SEASONAL ALLERGIC RHINITIS: ICD-10-CM

## 2023-05-16 DIAGNOSIS — E55.9 VITAMIN D INSUFFICIENCY: ICD-10-CM

## 2023-05-16 DIAGNOSIS — J31.0 CHRONIC RHINITIS: ICD-10-CM

## 2023-05-16 DIAGNOSIS — J30.89 SEASONAL ALLERGIC RHINITIS DUE TO OTHER ALLERGIC TRIGGER: ICD-10-CM

## 2023-05-16 PROCEDURE — 99214 OFFICE O/P EST MOD 30 MIN: CPT | Mod: VID | Performed by: PHYSICIAN ASSISTANT

## 2023-05-16 RX ORDER — LORATADINE 10 MG/1
10 TABLET ORAL DAILY
Qty: 30 TABLET | Refills: 5 | Status: SHIPPED | OUTPATIENT
Start: 2023-05-16 | End: 2023-11-29

## 2023-05-16 RX ORDER — FLUTICASONE PROPIONATE 50 MCG
SPRAY, SUSPENSION (ML) NASAL
Qty: 16 ML | Refills: 0 | Status: SHIPPED | OUTPATIENT
Start: 2023-05-16 | End: 2023-10-20

## 2023-05-16 RX ORDER — ALBUTEROL SULFATE 90 UG/1
2 AEROSOL, METERED RESPIRATORY (INHALATION) EVERY 4 HOURS PRN
Qty: 18 G | Refills: 3 | Status: SHIPPED | OUTPATIENT
Start: 2023-05-16

## 2023-05-16 NOTE — PROGRESS NOTES
Bhargav is a 62 year old who is being evaluated via a billable video visit.      How would you like to obtain your AVS? MyChart  If the video visit is dropped, the invitation should be resent by: Send to e-mail at: [email or web address]  Will anyone else be joining your video visit? No        Assessment & Plan   Problem List Items Addressed This Visit        Respiratory    Chronic seasonal allergic rhinitis    Relevant Medications    albuterol (PROAIR HFA/PROVENTIL HFA/VENTOLIN HFA) 108 (90 Base) MCG/ACT inhaler    loratadine (CLARITIN) 10 MG tablet    fluticasone (FLONASE) 50 MCG/ACT nasal spray   Other Visit Diagnoses     Bacterial infection due to H. pylori    -  Primary    Relevant Orders    Helicobacter pylori Antigen Stool    Seasonal allergic rhinitis due to other allergic trigger        Relevant Medications    albuterol (PROAIR HFA/PROVENTIL HFA/VENTOLIN HFA) 108 (90 Base) MCG/ACT inhaler    loratadine (CLARITIN) 10 MG tablet    fluticasone (FLONASE) 50 MCG/ACT nasal spray    Chronic rhinitis        Relevant Medications    albuterol (PROAIR HFA/PROVENTIL HFA/VENTOLIN HFA) 108 (90 Base) MCG/ACT inhaler    loratadine (CLARITIN) 10 MG tablet    fluticasone (FLONASE) 50 MCG/ACT nasal spray    Vitamin D insufficiency        Relevant Medications    Cholecalciferol (VITAMIN D3) 25 MCG (1000 UT) CAPS         H.pylori test was ordered. Bring test back 3 weeks after last day of treatment     Continue Claritin  Flonase and Albuterol        18 minutes spent by me on the date of the encounter doing chart review, history and exam, documentation and further activities per the note           Cherry Mosqueda PA-C  [facility]   Bhargav is a 62 year old, presenting for the following health issues:  H. Pylori        5/16/2023    10:11 AM   Additional Questions   Roomed by Wisam     History of Present Illness       Reason for visit:  Bhargav had a 2-week antibiotic treatment for H. Pylori -  We need to setup lab work to bring in a stool sample, testing for any H. Pylori, 2-weeks following antibiotic treatment. Bhargav finished his antibiotic treatment Monday, May 08, 2023.    He eats 4 or more servings of fruits and vegetables daily.He consumes 1 sweetened beverage(s) daily.He exercises with enough effort to increase his heart rate 10 to 19 minutes per day.  He exercises with enough effort to increase his heart rate 4 days per week.   He is taking medications regularly.       Allergies  Onset/Duration: chronic  Symptoms:   Nasal congestion: YES  Sneezing: YES  Red, itchy eyes: No  Progression of Symptoms: constant  Accompanying Signs & Symptoms:  Cough: No  Wheezing: YES- occasionally   Rash: No  Sinus/facial pain: No  History:   Is it seasonal: during any time of the year   History of Asthma: No  Has allergy testing been done: No  Precipitating factors:   None  Alleviating factors:  Allergy medications   Therapies tried and outcome: flonase and claritine, occasionally needs Albuterol          Review of Systems   Constitutional, HEENT, cardiovascular, pulmonary, gi and gu systems are negative, except as otherwise noted.      Objective           Vitals:  No vitals were obtained today due to virtual visit.    Physical Exam   GENERAL: Healthy, alert and no distress  EYES: Eyes grossly normal to inspection.  No discharge or erythema, or obvious scleral/conjunctival abnormalities.  RESP: No audible wheeze, cough, or visible cyanosis.  No visible retractions or increased work of breathing.    SKIN: Visible skin clear. No significant rash, abnormal pigmentation or lesions.  NEURO: Cranial nerves grossly intact.  Mentation and speech appropriate for age.  PSYCH: Mentation appears normal, affect normal/bright, judgement and insight intact, normal speech and appearance well-groomed.                Video-Visit Details    Type of service:  Video Visit     Originating Location (pt. Location): Home  Distant Location  (provider location):  On-site  Platform used for Video Visit: Julienne

## 2023-05-18 ENCOUNTER — TRANSFERRED RECORDS (OUTPATIENT)
Dept: HEALTH INFORMATION MANAGEMENT | Facility: CLINIC | Age: 63
End: 2023-05-18

## 2023-05-31 PROCEDURE — 87338 HPYLORI STOOL AG IA: CPT | Mod: VID | Performed by: PHYSICIAN ASSISTANT

## 2023-06-01 LAB — H PYLORI AG STL QL IA: NEGATIVE

## 2023-06-02 ENCOUNTER — HEALTH MAINTENANCE LETTER (OUTPATIENT)
Age: 63
End: 2023-06-02

## 2023-07-06 ENCOUNTER — OFFICE VISIT (OUTPATIENT)
Dept: FAMILY MEDICINE | Facility: CLINIC | Age: 63
End: 2023-07-06
Payer: COMMERCIAL

## 2023-07-06 VITALS
TEMPERATURE: 97.9 F | SYSTOLIC BLOOD PRESSURE: 134 MMHG | DIASTOLIC BLOOD PRESSURE: 85 MMHG | WEIGHT: 144.5 LBS | OXYGEN SATURATION: 99 % | HEIGHT: 63 IN | BODY MASS INDEX: 25.6 KG/M2 | HEART RATE: 68 BPM | RESPIRATION RATE: 20 BRPM

## 2023-07-06 DIAGNOSIS — Z13.6 CARDIOVASCULAR SCREENING; LDL GOAL LESS THAN 100: ICD-10-CM

## 2023-07-06 DIAGNOSIS — Z01.818 PREOP GENERAL PHYSICAL EXAM: Primary | ICD-10-CM

## 2023-07-06 LAB
ALBUMIN SERPL BCG-MCNC: 4.3 G/DL (ref 3.5–5.2)
ALP SERPL-CCNC: 95 U/L (ref 40–129)
ALT SERPL W P-5'-P-CCNC: 14 U/L (ref 0–70)
ANION GAP SERPL CALCULATED.3IONS-SCNC: 10 MMOL/L (ref 7–15)
AST SERPL W P-5'-P-CCNC: 23 U/L (ref 0–45)
BASOPHILS # BLD AUTO: 0 10E3/UL (ref 0–0.2)
BASOPHILS NFR BLD AUTO: 0 %
BILIRUB SERPL-MCNC: 1.6 MG/DL
BUN SERPL-MCNC: 18.7 MG/DL (ref 8–23)
CALCIUM SERPL-MCNC: 9.5 MG/DL (ref 8.8–10.2)
CHLORIDE SERPL-SCNC: 102 MMOL/L (ref 98–107)
CHOLEST SERPL-MCNC: 185 MG/DL
CREAT SERPL-MCNC: 0.96 MG/DL (ref 0.67–1.17)
DEPRECATED HCO3 PLAS-SCNC: 28 MMOL/L (ref 22–29)
EOSINOPHIL # BLD AUTO: 0.1 10E3/UL (ref 0–0.7)
EOSINOPHIL NFR BLD AUTO: 2 %
ERYTHROCYTE [DISTWIDTH] IN BLOOD BY AUTOMATED COUNT: 12.2 % (ref 10–15)
GFR SERPL CREATININE-BSD FRML MDRD: 89 ML/MIN/1.73M2
GLUCOSE SERPL-MCNC: 86 MG/DL (ref 70–99)
HCT VFR BLD AUTO: 45 % (ref 40–53)
HDLC SERPL-MCNC: 83 MG/DL
HGB BLD-MCNC: 15.1 G/DL (ref 13.3–17.7)
IMM GRANULOCYTES # BLD: 0 10E3/UL
IMM GRANULOCYTES NFR BLD: 0 %
INR PPP: 0.99 (ref 0.85–1.15)
LDLC SERPL CALC-MCNC: 89 MG/DL
LYMPHOCYTES # BLD AUTO: 0.9 10E3/UL (ref 0.8–5.3)
LYMPHOCYTES NFR BLD AUTO: 20 %
MCH RBC QN AUTO: 31.4 PG (ref 26.5–33)
MCHC RBC AUTO-ENTMCNC: 33.6 G/DL (ref 31.5–36.5)
MCV RBC AUTO: 94 FL (ref 78–100)
MONOCYTES # BLD AUTO: 0.4 10E3/UL (ref 0–1.3)
MONOCYTES NFR BLD AUTO: 10 %
NEUTROPHILS # BLD AUTO: 3.2 10E3/UL (ref 1.6–8.3)
NEUTROPHILS NFR BLD AUTO: 68 %
NONHDLC SERPL-MCNC: 102 MG/DL
PLATELET # BLD AUTO: 157 10E3/UL (ref 150–450)
POTASSIUM SERPL-SCNC: 4.1 MMOL/L (ref 3.4–5.3)
PROT SERPL-MCNC: 7 G/DL (ref 6.4–8.3)
RBC # BLD AUTO: 4.81 10E6/UL (ref 4.4–5.9)
SODIUM SERPL-SCNC: 140 MMOL/L (ref 136–145)
TRIGL SERPL-MCNC: 64 MG/DL
WBC # BLD AUTO: 4.6 10E3/UL (ref 4–11)

## 2023-07-06 PROCEDURE — 80053 COMPREHEN METABOLIC PANEL: CPT | Performed by: INTERNAL MEDICINE

## 2023-07-06 PROCEDURE — 85025 COMPLETE CBC W/AUTO DIFF WBC: CPT | Performed by: INTERNAL MEDICINE

## 2023-07-06 PROCEDURE — 36415 COLL VENOUS BLD VENIPUNCTURE: CPT | Performed by: INTERNAL MEDICINE

## 2023-07-06 PROCEDURE — 80061 LIPID PANEL: CPT | Performed by: INTERNAL MEDICINE

## 2023-07-06 PROCEDURE — 85610 PROTHROMBIN TIME: CPT | Performed by: INTERNAL MEDICINE

## 2023-07-06 PROCEDURE — 99214 OFFICE O/P EST MOD 30 MIN: CPT | Mod: LT | Performed by: INTERNAL MEDICINE

## 2023-07-06 ASSESSMENT — PAIN SCALES - GENERAL: PAINLEVEL: NO PAIN (0)

## 2023-07-06 NOTE — PATIENT INSTRUCTIONS
At [facility], we strive to deliver an exceptional experience to you, every time we see you. If you receive a survey, please complete it as we do value your feedback.  If you have MyChart, you can expect to receive results automatically within 24 hours of their completion.  Your provider will send a note interpreting your results as well.   If you do not have MyChart, you should receive your results in about a week by mail.    Your care team:                            Family Medicine Internal Medicine   MD Huang Ozuna MD Shantel Branch-Fleming, MD Srinivasa Vaka, MD Katya Belousova, PAEDVIN Caldwell CNP, MD ([facility]   Gonzalez Jung, MD Gisselle Valladares MD Amelia Massimini APRN ABBY Huntley APRN MD Savanna Larson MD          Clinic hours: Monday - Thursday 7 am-6 pm; Fridays 7 am-5 pm.   Urgent care: Monday - Friday 10 am- 8 pm; Saturday and Sunday 9 am-5 pm.    Clinic: [phone]       [facility]: Monday - Thursday 8 am - 7 pm; Friday 8 am - 6 pm  [facility] [place] [facility]: [phone]

## 2023-07-06 NOTE — PROGRESS NOTES
[address] [address]  Phone: [phone]  Primary Provider: Huang Keane MD  Attending Physician: Huang Keane MD    PREOPERATIVE EVALUATION:  Today's date: 7/6/2023    Kody Benton is a 62 year old male who presents for a preoperative evaluation.    Surgical Information:  Surgery/Procedure: Bilateral inguinal and umbilical hernia repair  Surgery Location: [facility]  Surgeon: Dr. Swann  Surgery Date: 7/10/23  Time of Surgery: 1:30pm  Where patient plans to recover: At home with family  Fax number for surgical facility: Available in Epic    HPI related to upcoming procedure:           This is a 62 year old male who comes in today for a preoperative evaluation. Mr. Benton has left inguinal hernia. He presented to [facility] ER due to severe abdominal pain. CT of abdomen/pelvis shows left inguinal hernia containing partially sigmoid colon with incarceration. General Surgery was consulted and the patient was seen by Hollis Swann MD. His left inguinal hernia was reduced.  The patient is then scheduled to undergo a left inguinal herniorrhaphy on July 10, 2023.        7/4/2023     3:36 PM   Preop Questions   1. Have you ever had a heart attack or stroke? No   2. Have you ever had surgery on your heart or blood vessels, such as a stent placement, a coronary artery bypass, or surgery on an artery in your head, neck, heart, or legs? No   3. Do you have chest pain with activity? No   4. Do you have a history of  heart failure? No   5. Do you currently have a cold, bronchitis or symptoms of other infection? No   6. Do you have a cough, shortness of breath, or wheezing? No   7. Do you or anyone in your family have previous history of blood clots? YES    8. Do you or does anyone in your family have a serious bleeding problem such as prolonged bleeding following surgeries or cuts? No   9. Have you ever had problems with anemia or been told  to take iron pills? No   10. Have you had any abnormal blood loss such as black, tarry or bloody stools? No   11. Have you ever had a blood transfusion? No   12. Are you willing to have a blood transfusion if it is medically needed before, during, or after your surgery? Yes   13. Have you or any of your relatives ever had problems with anesthesia? No   14. Do you have sleep apnea, excessive snoring or daytime drowsiness? YES    14a. Do you have a CPAP machine? Yes   15. Do you have any artifical heart valves or other implanted medical devices like a pacemaker, defibrillator, or continuous glucose monitor? No   16. Do you have artificial joints? No   17. Are you allergic to latex? No       Health Care Directive:  Patient does not have a Health Care Directive or Living Will: Patient wants FULL CODE.    Preoperative Review of :   reviewed - no record of controlled substances prescribed.    Review of Systems  CONSTITUTIONAL: NEGATIVE for fever, chills, change in weight  INTEGUMENTARY/SKIN: NEGATIVE for worrisome rashes, moles or lesions  EYES: NEGATIVE for vision changes or irritation  ENT/MOUTH: NEGATIVE for ear, mouth and throat problems  RESP: NEGATIVE for significant cough or SOB  CV: NEGATIVE for chest pain, palpitations or peripheral edema  GI: NEGATIVE for hematemesis, hematochezia, jaundice and melena  : NEGATIVE for frequency, dysuria, or hematuria  MUSCULOSKELETAL: NEGATIVE for significant arthralgias or myalgia  NEURO: NEGATIVE for weakness, dizziness or paresthesias  ENDOCRINE: NEGATIVE for temperature intolerance, skin/hair changes  HEME: NEGATIVE for bleeding problems  PSYCHIATRIC: NEGATIVE for changes in mood or affect    Patient Active Problem List    Diagnosis Date Noted     Chronic seasonal allergic rhinitis 02/16/2021     Priority: Medium     SERENE (obstructive sleep apnea)- mild (AHI 12) 08/20/2018     Priority: Medium     8/15/2018 [facility] (175.0 lbs) - AHI 12.9, RDI  17.1, Supine AHI 14.4, REM AHI 27.5, Low O2 76.6%, Time Spent ?88% 3.9 minutes / Time Spent ?89% 5.8 minutes.       Bilateral shoulder pain, unspecified chronicity 06/20/2018     Priority: Medium     Allergic rhinitis due to cat hair 08/07/2017     Priority: Medium     Allergic rhinitis due to mold 08/07/2017     Priority: Medium     Allergic rhinitis due to dust mite 08/07/2017     Priority: Medium     Esophageal web determined by endoscopy 08/27/2016     Priority: Medium     Gastritis, Helicobacter pylori 08/27/2016     Priority: Medium     Advanced directives, counseling/discussion 10/08/2015     Priority: Medium     Discussed advance care planning with patient; information given to patient to review. October 8, 2015 Sai Claros MA       Down's syndrome 10/08/2015     Priority: Medium     'mosaic'       Unilateral inguinal hernia 10/08/2015     Priority: Medium     Class 1 obesity due to excess calories without serious comorbidity with body mass index (BMI) of 32.0 to 32.9 in adult 06/25/2018     Priority: Low      Past Medical History:   Diagnosis Date     Depressive disorder      Uncomplicated asthma     Asthma medication is taken as needed, not on a daily basis.     Past Surgical History:   Procedure Laterality Date     BIOPSY  ?    Colonoscopy Polyps     COLONOSCOPY  ?     GI SURGERY  ?    Esophagus webbing removed     NO HISTORY OF SURGERY       Current Outpatient Medications   Medication Sig Dispense Refill     albuterol (PROAIR HFA/PROVENTIL HFA/VENTOLIN HFA) 108 (90 Base) MCG/ACT inhaler Inhale 2 puffs into the lungs every 4 hours as needed for shortness of breath or wheezing 18 g 3     Cholecalciferol (VITAMIN D3) 25 MCG (1000 UT) CAPS Take 1 capsule by mouth daily 90 capsule 3     fluticasone (FLONASE) 50 MCG/ACT nasal spray USE TWO SPRAYS IN EACH NOSTRIL DAILY 16 mL 0     loratadine (CLARITIN) 10 MG tablet Take 1 tablet (10 mg) by mouth daily 30 tablet 5     order for DME AUTOPAP [id number] 1 Device 0  "      No Known Allergies     Social History     Tobacco Use     Smoking status: Former     Packs/day: 0.50     Years: 6.00     Pack years: 3.00     Types: Cigarettes, Pipe     Start date: 1986     Quit date: 1992     Years since quittin.5     Smokeless tobacco: Never   Substance Use Topics     Alcohol use: Yes     Comment: Occasional     OBJECTIVE  /85 (BP Location: Left arm, Patient Position: Sitting, Cuff Size: Adult Regular)   Pulse 68   Temp 97.9  F (36.6  C) (Tympanic)   Resp 20   Ht 1.6 m (5' 3\")   Wt 65.5 kg (144 lb 8 oz)   SpO2 99%   BMI 25.60 kg/m    Physical Exam    GENERAL APPEARANCE: healthy, alert and no distress     EYES: Eyes grossly normal to inspection and conjunctivae and sclerae normal     HENT: normal cephalic/atraumatic     RESP: lungs clear to auscultation - no rales, rhonchi or wheezes     CV: regular rates and rhythm     NEURO: Normal strength and tone, sensory exam grossly normal, mentation intact and speech normal     PSYCH: mentation appears normal. and affect normal/bright    No results for input(s): HGB, PLT, INR, NA, POTASSIUM, CR, A1C in the last 27334 hours.     Diagnostics:   No EKG required, no history of coronary heart disease, significant arrhythmia, peripheral arterial disease or other structural heart disease.  2023 1:07 AM CDT    IMPRESSION:    1.  Left inguinal hernia containing a portion of the sigmoid colon.  There is inflammatory stranding and fluid in the fat of this hernia surrounding this portion of the sigmoid colon suggesting incarceration.  2.  Diverticulosis without diverticulitis.    REPORT SIGNED BY DR. GEETA ARENAS       Results - CT ABDOMEN & PELVIS W/O ORAL W IV CON (2023 12:31 AM CDT)  Narrative   2023 1:07 AM CDT    EXAM:  CT ABDOMEN & PELVIS W/O ORAL W IV CON    DATE:  2023 12:16 AM    CLINICAL DATA:    ADDITIONAL CLINICAL DATA:  LLQ Pain      COMPARISON:  None.    TECHNIQUE: Thin-section contiguous transaxial " images were obtained through the abdomen and pelvis with intravenous contrast.  Coronal and sagittal reformations were also obtained through the abdomen and pelvis.     CONTRAST: IOHEXOL 350 MGI/ML    Dose Given: 75 mL     FINDINGS:    LUNG BASES:  Normal.     LIVER:  Normal.    GALLBLADDER:  Normal.    BILIARY TREE:  Normal     PANCREAS:  Normal.    SPLEEN:  Normal.    ADRENAL GLANDS:  Normal.      KIDNEYS:  Normal.    BLADDER:  Bladder is distended, but otherwise unremarkable.    LYMPH NODES:  No lymphadenopathy.      FLUID:  No free or loculated fluid collections.    BOWEL:  Diverticulosis.  There is a left inguinal hernia containing a portion of the sigmoid colon.  There is inflammatory stranding and small amount of fluid within this hernia.  There is no evidence of bowel obstruction.  The appendix is normal.  Normal enhancement of the herniated wall of the colon.    AORTA:  Atherosclerotic disease, but no aneurysm or evidence of active bleeding.    BONES:  No significant findings in the visualized bones.    OTHER:         Results - CT ABDOMEN & PELVIS W/O ORAL W IV CON (05/12/2023 12:31 AM CDT)  Procedure Note   Kody Jesus MD - 05/12/2023         ASSESSMENT/PLAN  Preop general physical exam  Revised Cardiac Risk Index (RCRI):  The patient has the following serious cardiovascular risks for perioperative complications:   - No serious cardiac risks = 0 points   RCRI Interpretation: 0 points: Class I (very low risk - 0.4% complication rate)  - CBC with platelets and differential  - Comprehensive metabolic panel  - INR    CARDIOVASCULAR SCREENING; LDL GOAL LESS THAN 100  - Lipid panel reflex to direct LDL Non-fasting      Signed Electronically by: Huang Keane MD  Copy of this evaluation report is provided to requesting physician.

## 2023-07-25 ENCOUNTER — TRANSFERRED RECORDS (OUTPATIENT)
Dept: HEALTH INFORMATION MANAGEMENT | Facility: CLINIC | Age: 63
End: 2023-07-25
Payer: COMMERCIAL

## 2023-10-20 DIAGNOSIS — J31.0 CHRONIC RHINITIS: ICD-10-CM

## 2023-10-20 RX ORDER — FLUTICASONE PROPIONATE 50 MCG
SPRAY, SUSPENSION (ML) NASAL
Qty: 16 G | Refills: 0 | Status: SHIPPED | OUTPATIENT
Start: 2023-10-20 | End: 2024-07-19

## 2023-11-29 DIAGNOSIS — J30.89 SEASONAL ALLERGIC RHINITIS DUE TO OTHER ALLERGIC TRIGGER: ICD-10-CM

## 2023-11-29 RX ORDER — LORATADINE 10 MG/1
1 TABLET ORAL DAILY
Qty: 30 TABLET | Refills: 5 | Status: SHIPPED | OUTPATIENT
Start: 2023-11-29

## 2024-01-03 DIAGNOSIS — J30.89 SEASONAL ALLERGIC RHINITIS DUE TO OTHER ALLERGIC TRIGGER: ICD-10-CM

## 2024-01-03 NOTE — TELEPHONE ENCOUNTER
Pharmacy requesting discontinued med:    fexofenadine (ALLEGRA) 180 MG tablet (Discontinued) 90 tablet 0 4/8/2022 5/16/2023

## 2024-01-05 RX ORDER — FEXOFENADINE HCL 180 MG/1
180 TABLET ORAL EVERY MORNING
Qty: 30 TABLET | Refills: 5 | Status: SHIPPED | OUTPATIENT
Start: 2024-01-05

## 2024-04-18 ENCOUNTER — TELEPHONE (OUTPATIENT)
Dept: FAMILY MEDICINE | Facility: CLINIC | Age: 64
End: 2024-04-18
Payer: COMMERCIAL

## 2024-04-18 DIAGNOSIS — J30.89 SEASONAL ALLERGIC RHINITIS DUE TO OTHER ALLERGIC TRIGGER: Primary | ICD-10-CM

## 2024-04-18 NOTE — TELEPHONE ENCOUNTER
Medication Question or Refill        What medication are you calling about (include dose and sig)?: Allegra    Preferred Pharmacy:   [id number] - [facility] [address] [address]  Phone: [phone] Fax: [phone]      Controlled Substance Agreement on file:   CSA -- Patient Level:    CSA: None found at the patient level.       Who prescribed the medication?: PCP    Do you need a refill? Yes    Patient offered an appointment? No    Do you have any questions or concerns?  Yes: Needs PA started

## 2024-04-22 RX ORDER — LEVOCETIRIZINE DIHYDROCHLORIDE 5 MG/1
5 TABLET, FILM COATED ORAL EVERY EVENING
Qty: 30 TABLET | Refills: 11 | Status: SHIPPED | OUTPATIENT
Start: 2024-04-22

## 2024-04-22 NOTE — TELEPHONE ENCOUNTER
Closed but I do not see it ever routed for PA request    Plan does not cover fexofenadine (ALLEGRA) 180 MG tablet .  Please go to [email or web address] to initiate Prior Auth or switch to alternative medication.    Insurance type and ID number: Key:  [id number]      Additional Information:     Malika Rubio

## 2024-04-22 NOTE — TELEPHONE ENCOUNTER
Notify patient that PA request for  Fexofenadine was denied and that Rx for Xyzal sent as substitute.

## 2024-04-22 NOTE — TELEPHONE ENCOUNTER
Retail Pharmacy Prior Authorization Team   Phone: [phone]    PA Initiation    Medication: FEXOFENADINE  MG PO TABS  Insurance Company: [employer] - Phone [phone] Fax [phone]  Pharmacy Filling the Rx: [id number] - [place], [place] - [address]  Filling Pharmacy Phone: [phone]  Filling Pharmacy Fax:    Start Date: 4/22/2024

## 2024-04-22 NOTE — TELEPHONE ENCOUNTER
Note: Due to record-high volumes, our turn-around time is taking longer than usual . We are currently 10 business days behind in the pools.   We are working diligently to submit all requests in a timely manner and in the order they are received. Please only flag TRUE URGENT requests as high priority to the pool at this time.   If you have questions - please send a note/message in the active PA encounter and send back to the [place] PA pool [[id number]].    If you have more specific questions about our process please reach out to our supervisor Sangita Kessler.   Thank you!   RPPA (Retail Pharmacy Prior Authorization) team    We are currently submitting requests we received on 04/09/2024    PRIOR AUTHORIZATION DENIED    Medication: FEXOFENADINE  MG PO TABS  Insurance Company: [employer] - Phone [phone] Fax [phone]  Denial Date: 4/22/2024  Denial Rational:   Received a call from Margoth at [employer], she states PA is denied, this medication is not covered.  She states formulary meds are: cetirizine, loratadine, and generic Xyzal      Appeal Information: N/A      Patient Notified: No

## 2024-05-24 DIAGNOSIS — E55.9 VITAMIN D INSUFFICIENCY: ICD-10-CM

## 2024-05-24 RX ORDER — UBIDECARENONE 100 MG
1 CAPSULE ORAL DAILY
Qty: 90 CAPSULE | Refills: 0 | Status: SHIPPED | OUTPATIENT
Start: 2024-05-24 | End: 2024-07-19

## 2024-05-30 ENCOUNTER — MYC REFILL (OUTPATIENT)
Dept: FAMILY MEDICINE | Facility: CLINIC | Age: 64
End: 2024-05-30
Payer: COMMERCIAL

## 2024-05-30 DIAGNOSIS — E55.9 VITAMIN D INSUFFICIENCY: ICD-10-CM

## 2024-05-31 RX ORDER — UBIDECARENONE 100 MG
1 CAPSULE ORAL DAILY
Qty: 90 CAPSULE | Refills: 0 | OUTPATIENT
Start: 2024-05-31

## 2024-06-26 ENCOUNTER — TRANSFERRED RECORDS (OUTPATIENT)
Dept: HEALTH INFORMATION MANAGEMENT | Facility: CLINIC | Age: 64
End: 2024-06-26
Payer: COMMERCIAL

## 2024-06-29 ENCOUNTER — HEALTH MAINTENANCE LETTER (OUTPATIENT)
Age: 64
End: 2024-06-29

## 2024-07-18 DIAGNOSIS — E55.9 VITAMIN D INSUFFICIENCY: ICD-10-CM

## 2024-07-18 DIAGNOSIS — J31.0 CHRONIC RHINITIS: ICD-10-CM

## 2024-07-19 RX ORDER — FLUTICASONE PROPIONATE 50 MCG
SPRAY, SUSPENSION (ML) NASAL
Qty: 16 G | Refills: 5 | Status: SHIPPED | OUTPATIENT
Start: 2024-07-19

## 2024-07-19 RX ORDER — UBIDECARENONE 100 MG
1 CAPSULE ORAL DAILY
Qty: 90 CAPSULE | Refills: 0 | Status: SHIPPED | OUTPATIENT
Start: 2024-07-19

## 2024-08-08 ENCOUNTER — PATIENT OUTREACH (OUTPATIENT)
Dept: GERIATRIC MEDICINE | Facility: CLINIC | Age: 64
End: 2024-08-08
Payer: COMMERCIAL

## 2024-08-08 NOTE — LETTER
August 8, 2024      KODY LEWIS  [address]  [place] [address]      Dear Kody:    Welcome to [facility] MSC+ health program. My name is Tee Carpio RN. I m your care coordinator. You re eligible for care coordination benefits through your [facility] MSC+ plan.    As your care coordinator, we ll:  Meet to go over your care coordination benefits  Talk about your physical and mental health care needs   Review your preventative care needs  Create a plan that meets your needs with the services you choose    I won t replace your DD waiver . Your waiver  and I work together to coordinate your Medicaid and waiver benefits.    What happens next?  I ll call you soon to introduce myself and tell you more about my role. We ll then plan time to go over your health and safety needs. Our goal is to keep you as healthy and independent as possible.    [facility]+ includes the benefits you may already have from Medical Assistance. Soon you ll receive a new member identification (ID) card from [facility]. Use this card along with your [place] Health Care Programs and Prescription Drug Coverage Program cards whenever you get health services. If you have Medicare, you ll also need to show your Medicare card when you get health services.    The [facility]+ care coordination program is voluntary and offered to you at no cost. If you wish to stop being in the care coordination program or have questions, call me at [phone]. If you reach my voicemail, leave a message and your phone number. TTY users, call the [place] Relay at 456 or 1-966.616.9806 (yvyvxr-th-hjluxl relay service).  Sincerely,       Tee Carpio RN    E-mail: [email or web address]  Phone: [phone]      [employer]    [id number] accepted  [id number]                                                       [id number] (07/2022)

## 2024-08-12 ENCOUNTER — PATIENT OUTREACH (OUTPATIENT)
Dept: GERIATRIC MEDICINE | Facility: CLINIC | Age: 64
End: 2024-08-12
Payer: COMMERCIAL

## 2024-08-19 ENCOUNTER — PATIENT OUTREACH (OUTPATIENT)
Dept: GERIATRIC MEDICINE | Facility: CLINIC | Age: 64
End: 2024-08-19
Payer: COMMERCIAL

## 2024-08-19 NOTE — PROGRESS NOTES
[facility] Care Coordination Contact    See previous note on new member.    Tee Carpio RN   [facility]   [phone]

## 2024-09-07 ENCOUNTER — HEALTH MAINTENANCE LETTER (OUTPATIENT)
Age: 64
End: 2024-09-07

## 2024-11-16 DIAGNOSIS — E55.9 VITAMIN D INSUFFICIENCY: ICD-10-CM

## 2024-11-18 RX ORDER — UBIDECARENONE 100 MG
1 CAPSULE ORAL DAILY
Qty: 90 CAPSULE | Refills: 0 | Status: SHIPPED | OUTPATIENT
Start: 2024-11-18

## 2025-01-08 ENCOUNTER — PATIENT OUTREACH (OUTPATIENT)
Dept: GERIATRIC MEDICINE | Facility: CLINIC | Age: 65
End: 2025-01-08
Payer: COMMERCIAL

## 2025-01-08 NOTE — LETTER
January 8, 2025    KODY LEWIS  [address]  [place] [address]      Dear Kody,    Welcome to [facility] health program. My name is Skylar Howard RN. I am your [facility] care coordinator. You're eligible for care coordination through your [facility] Product.    As your care coordinator, we ll:  Meet to go over your care coordination benefits  Talk about your physical and mental health care needs   Review your preventative care needs  Create a plan that meets your needs with the services you choose    What happens next?  I ll call you soon to introduce myself and tell you more about my role. We ll then plan time to go over your health and safety needs. Our goal is to keep you as healthy and independent as possible.    [facility] combines the benefits you may already have receive from Medical Assistance, Medicare and the Prescription Drug Coverage Program. Soon you'll receive a new member identification (ID) card from [facility]. Use this card whenever you get health services.    The [facility] care coordination program is voluntary and offered to you at no cost. If you wish to stop being in the care coordination program or have questions, call me at [phone]. If you reach my voicemail, leave a message and your phone number. TTY users, call the [place] Relay at 771 or 1-710.536.2680 (toxshr-og-zyiwmi relay service).    Sincerely,    Skylar Howard RN    E-mail: [email or web address]  Phone: [phone]      [facility] is a health plan that contracts with both Medicare and the [place] Medical Assistance (Medicaid) program to provide benefits of both programs to enrollees. Enrollment in [facility] depends on contract renewal.    [id number] accepted   [id number]         [id number] (07/2022)

## 2025-01-08 NOTE — PROGRESS NOTES
[facility] Care Coordination Contact    Member changed health plan products from [facility] MSC+ to [facility] effective 1/1/2025. CC to complete items on Product Change/ Health Plan Change check list including MMIS entry, as applicable. CMS mailed Welcome Letter, [place], [place] and other required tasks.    Nai Carpio  Care Management Specialist  [facility]  [phone]

## 2025-02-11 ENCOUNTER — PATIENT OUTREACH (OUTPATIENT)
Dept: GERIATRIC MEDICINE | Facility: CLINIC | Age: 65
End: 2025-02-11
Payer: COMMERCIAL

## 2025-02-11 ASSESSMENT — ACTIVITIES OF DAILY LIVING (ADL): DEPENDENT_IADLS:: COOKING;SHOPPING;MONEY MANAGEMENT;TRANSPORTATION

## 2025-02-11 NOTE — Clinical Note
FYI... Hello, I am this Pt's [facility] Care Coordinator. Please see my note for a detailed plan of care. Thanks! Skylar Howard RN,BSN [facility] Case Coordinator  [phone] SHASHANK Keane, I am this Pt's [facility] Care Coordinator. Please see my note for a detailed plan of care. Thanks! Skylar Howard RN,REGGIEN [facility] Case Coordinator  [phone]

## 2025-02-12 ENCOUNTER — PATIENT OUTREACH (OUTPATIENT)
Dept: GERIATRIC MEDICINE | Facility: CLINIC | Age: 65
End: 2025-02-12
Payer: COMMERCIAL

## 2025-02-12 NOTE — PROGRESS NOTES
[facility] Care Coordination Contact    Received after visit chart from care coordinator.  Completed following tasks: Mailed copy of [place] to [place], Mailed [place] pages 3-4, Mailed Safe Medication Disposal , and Mailed Transition of Care Member Handout    Coleen Chavarria  Care Management Specialist  [facility]  [phone]

## 2025-02-12 NOTE — LETTER
February 12, 2025       KODY LEWIS  [address]  [place] [address]      Dear Kody,    At [facility], we re dedicated to improving your health and wellness. Enclosed is the Support Plan developed with you on 2/11/2025. Please review the Support Plan carefully.    As a reminder, during your visit we talked about:   Ways to manage your physical and mental health   Using health care to maintain and improve your health    Your preventive care needs      Remember to contact your care coordinator if you:   Are hospitalized or plan to be hospitalized    Have a fall     Have a change in your physical or mental health   Need help finding support or services    If you have questions or don t agree with your Support Plan, call me at [phone]. You can also call me if your needs change. TTY users call the [place] Relay at 992 or [phone] (vnghln-pf-rliudu relay service).    Sincerely,       Skylar Howard RN    E-mail: [email or web address]  Phone: [phone]      [facility]                [id number] accepted     (06/2024)                [address] [place], [place] [address] [phone]  fax [phone]  [facility]

## 2025-02-12 NOTE — PROGRESS NOTES
[facility] Care Coordination Contact  [facility] CCDB Assessment   (for members on other waivers)    Home visit for Health Risk Assessment with Kody MCKEON Chetan completed on February 11, 2025. We met in the [employer] per family request. The member's c/g is concerned about having germs/ Covid in their home and prefer to meet outside of the home.    Type of residence:: Private home - stairs  Current living arrangement:: I live in a private home with family     Assessment completed with:: Patient, Family, Other ( and DD waiver worker)     2/12/2025 CC sent an email, per her request, that HRA and Sp are complete in [facility] [place] her [place].     Current Care Plan  Member is a recipient of the DD Waiver program.  Member currently receiving the following home care services:     Member currently receiving the following community resources: Meals on Wheels 7 days a week and IHS 7 days a week.       Medication Review  Medication reconciliation completed in Epic: Yes  Medication set-up & administration: Independent-does not set up.  Self-administers medications.  Medication Risk Assessment Medication (1 or more, place referral to MTM): N/A: No risk factors identified  MTM Referral Placed: No: No risk factors idenified    Mental/Behavioral Health   Depression Screening: Not completed. Member reports his mood is good. He is hopeful for the future.          Mental health DX:: No        Falls Assessment:   Fallen 2 or more times in the past year?: No   Any fall with injury in the past year?: No    ADL/IADL Dependencies:   Dependent ADLs:: Independent  Dependent IADLs:: Cooking, Shopping, Money Management, Transportation    Health Plan sponsored benefits: [facility]: Shared information regarding [employer]. Reviewed preventative health screening and health plan supplemental benefits/incentives. Reviewed medication disposal form and transition of care member handout.    CFSS  Assessment completed at visit: Not Applicable      Care Plan & Recommendations: Services per DD waiver. Mom's meals 7 per week and IHS services 7 hrs per week.  Member's Guardian was present at the visit as well. Bhargav is due for preventative care. CC provided Eye care specialists and dentists.      [place]     [place]  with notification of H[place] being complete and [place] for [place].    Follow-Up Plan: Member informed of future contact, plan to f/u with member with a 6 month telephone assessment.  Contact information shared with member and family, encouraged member to call with any questions or concerns at any time.    [facility] care continuum providers: Please see Snapshot and Care Management Flowsheets for Specific details of care plan.    This CC note routed to PCP, Huang Keane.     Skylar Howard RN,BSN  [facility] Case Coordinator   [phone]

## 2025-02-19 DIAGNOSIS — E55.9 VITAMIN D INSUFFICIENCY: ICD-10-CM

## 2025-02-20 RX ORDER — UBIDECARENONE 100 MG
1 CAPSULE ORAL DAILY
Qty: 30 CAPSULE | Refills: 0 | Status: SHIPPED | OUTPATIENT
Start: 2025-02-20

## 2025-04-21 DIAGNOSIS — E55.9 VITAMIN D INSUFFICIENCY: ICD-10-CM

## 2025-04-23 RX ORDER — UBIDECARENONE 100 MG
1 CAPSULE ORAL DAILY
Qty: 30 CAPSULE | Refills: 0 | OUTPATIENT
Start: 2025-04-23

## 2025-04-23 RX ORDER — UBIDECARENONE 100 MG
1 CAPSULE ORAL DAILY
Qty: 90 CAPSULE | Refills: 0 | Status: SHIPPED | OUTPATIENT
Start: 2025-04-23

## 2025-05-11 ENCOUNTER — HEALTH MAINTENANCE LETTER (OUTPATIENT)
Age: 65
End: 2025-05-11

## 2025-06-25 ENCOUNTER — PATIENT OUTREACH (OUTPATIENT)
Dept: GERIATRIC MEDICINE | Facility: CLINIC | Age: 65
End: 2025-06-25
Payer: COMMERCIAL

## 2025-06-25 NOTE — PROGRESS NOTES
[facility] Care Coordination Contact    Internal CC change effective 7/1/2025.  Mailed member CC Change letter.  Additional tasks to be completed by CMS include: update database & EPIC, enter CC Change in MMIS, and move member file.    Renetta Dick  Case Management Specialist   [facility]  [phone]

## 2025-06-25 NOTE — LETTER
June 25, 2025    KODY LEWIS  [address]  [place] [facility] [address]      Dear Kody:    As a member of [facility] ([facility] ([facility]), you are provided a care coordinator. I will be your new care coordinator as of 7/1/2025. I will be calling you soon to see how you are doing and determine your needs.    If you have any questions, please feel free to call me at [phone]. If you reach my voice mail, please leave a message and your phone number. If you are hearing impaired, please call the [place] Relay at 548 or [phone] (hiescp-pj-cmbzwf relay service).    I look forward to speaking with you soon.    Sincerely,    Princess Sandoval, RN   [phone]  [email or web address] is a health plan that contracts with both Medicare and the [place] Medical Assistance (Medicaid) program to provide benefits of both programs to enrollees. Enrollment in [facility] depends on contract renewal.      [facility] [place] [place]  [id number] DHS Approved ([id number])  [id number] (11/18)

## 2025-07-20 DIAGNOSIS — J31.0 CHRONIC RHINITIS: ICD-10-CM

## 2025-07-20 DIAGNOSIS — E55.9 VITAMIN D INSUFFICIENCY: ICD-10-CM

## 2025-07-20 DIAGNOSIS — J30.89 SEASONAL ALLERGIC RHINITIS DUE TO OTHER ALLERGIC TRIGGER: ICD-10-CM

## 2025-07-23 RX ORDER — FLUTICASONE PROPIONATE 50 MCG
SPRAY, SUSPENSION (ML) NASAL
Qty: 16 G | Refills: 1 | Status: SHIPPED | OUTPATIENT
Start: 2025-07-23

## 2025-07-23 RX ORDER — FEXOFENADINE HCL 180 MG/1
180 TABLET ORAL EVERY MORNING
Qty: 60 TABLET | Refills: 0 | Status: SHIPPED | OUTPATIENT
Start: 2025-07-23

## 2025-07-23 RX ORDER — CHOLECALCIFEROL (VITAMIN D3) 25 MCG
1 CAPSULE ORAL DAILY
Qty: 60 CAPSULE | Refills: 0 | Status: SHIPPED | OUTPATIENT
Start: 2025-07-23

## 2025-07-24 ENCOUNTER — PATIENT OUTREACH (OUTPATIENT)
Dept: GERIATRIC MEDICINE | Facility: CLINIC | Age: 65
End: 2025-07-24
Payer: COMMERCIAL

## 2025-07-24 NOTE — PROGRESS NOTES
[facility] Care Coordination Contact      [facility] Six-Month Telephone Assessment    6 month telephone assessment completed on 7/24/25.    ER visits: No  Hospitalizations: No  TCU stays: No  Significant health status changes: none  Falls/Injuries: No  ADL/IADL changes: No  Changes in services: No    Caregiver Assessment follow up:  n/a    Goals: See Support Plan for goal progress documentation.      Bhragav reports he has had an uneventful 6 mo, feeling good.Bhargav reports no concerns for me.  Confirmed he got the letter with my contact info on it and will call if needed.    Will see member in 6 months for an annual health risk assessment.   Encouraged member to call CC with any questions or concerns in the meantime.     Princess Sandoval RN  [facility]  [phone]